# Patient Record
Sex: MALE | Race: WHITE | Employment: OTHER | URBAN - METROPOLITAN AREA
[De-identification: names, ages, dates, MRNs, and addresses within clinical notes are randomized per-mention and may not be internally consistent; named-entity substitution may affect disease eponyms.]

---

## 2017-01-23 ENCOUNTER — OFFICE VISIT (OUTPATIENT)
Dept: FAMILY MEDICINE CLINIC | Age: 57
End: 2017-01-23

## 2017-01-23 VITALS
WEIGHT: 293 LBS | DIASTOLIC BLOOD PRESSURE: 82 MMHG | SYSTOLIC BLOOD PRESSURE: 148 MMHG | RESPIRATION RATE: 16 BRPM | OXYGEN SATURATION: 97 % | BODY MASS INDEX: 35.68 KG/M2 | HEART RATE: 89 BPM | HEIGHT: 76 IN

## 2017-01-23 DIAGNOSIS — I10 ESSENTIAL HYPERTENSION: ICD-10-CM

## 2017-01-23 DIAGNOSIS — M25.561 ACUTE PAIN OF RIGHT KNEE: ICD-10-CM

## 2017-01-23 DIAGNOSIS — M25.461 EFFUSION, RIGHT KNEE: Primary | ICD-10-CM

## 2017-01-23 PROCEDURE — 99213 OFFICE O/P EST LOW 20 MIN: CPT | Performed by: NURSE PRACTITIONER

## 2017-01-23 RX ORDER — DICLOFENAC SODIUM 75 MG/1
75 TABLET, DELAYED RELEASE ORAL 2 TIMES DAILY
Qty: 30 TABLET | Refills: 0 | Status: SHIPPED | OUTPATIENT
Start: 2017-01-23 | End: 2017-02-16 | Stop reason: SDUPTHER

## 2017-01-23 ASSESSMENT — ENCOUNTER SYMPTOMS
VOMITING: 0
COUGH: 0
CHEST TIGHTNESS: 0
NAUSEA: 0
BACK PAIN: 0

## 2017-01-25 ENCOUNTER — OFFICE VISIT (OUTPATIENT)
Dept: FAMILY MEDICINE CLINIC | Age: 57
End: 2017-01-25

## 2017-01-25 VITALS
HEIGHT: 76 IN | SYSTOLIC BLOOD PRESSURE: 138 MMHG | DIASTOLIC BLOOD PRESSURE: 88 MMHG | BODY MASS INDEX: 35.31 KG/M2 | WEIGHT: 290 LBS | HEART RATE: 83 BPM | OXYGEN SATURATION: 98 %

## 2017-01-25 DIAGNOSIS — M25.561 ACUTE PAIN OF RIGHT KNEE: ICD-10-CM

## 2017-01-25 DIAGNOSIS — E11.9 TYPE 2 DIABETES MELLITUS WITHOUT COMPLICATION, WITHOUT LONG-TERM CURRENT USE OF INSULIN (HCC): Primary | ICD-10-CM

## 2017-01-25 DIAGNOSIS — I10 ESSENTIAL HYPERTENSION: ICD-10-CM

## 2017-01-25 DIAGNOSIS — E78.2 MIXED HYPERLIPIDEMIA: ICD-10-CM

## 2017-01-25 LAB — HBA1C MFR BLD: 7.1 %

## 2017-01-25 PROCEDURE — 83036 HEMOGLOBIN GLYCOSYLATED A1C: CPT | Performed by: FAMILY MEDICINE

## 2017-01-25 PROCEDURE — 99214 OFFICE O/P EST MOD 30 MIN: CPT | Performed by: FAMILY MEDICINE

## 2017-01-27 DIAGNOSIS — J45.41 MODERATE PERSISTENT ASTHMA WITH ACUTE EXACERBATION: ICD-10-CM

## 2017-01-27 DIAGNOSIS — I10 ESSENTIAL HYPERTENSION: ICD-10-CM

## 2017-01-27 DIAGNOSIS — J45.901 ASTHMA EXACERBATION: ICD-10-CM

## 2017-01-27 DIAGNOSIS — I47.9 PAROXYSMAL TACHYCARDIA (HCC): ICD-10-CM

## 2017-01-27 DIAGNOSIS — F17.200 SMOKER: ICD-10-CM

## 2017-01-27 DIAGNOSIS — E78.2 MIXED HYPERLIPIDEMIA: ICD-10-CM

## 2017-01-30 RX ORDER — METOPROLOL SUCCINATE 50 MG/1
TABLET, EXTENDED RELEASE ORAL
Qty: 30 TABLET | Refills: 3 | Status: SHIPPED | OUTPATIENT
Start: 2017-01-30 | End: 2017-07-25 | Stop reason: SDUPTHER

## 2017-01-31 ENCOUNTER — TELEPHONE (OUTPATIENT)
Dept: FAMILY MEDICINE CLINIC | Age: 57
End: 2017-01-31

## 2017-02-01 ENCOUNTER — HOSPITAL ENCOUNTER (OUTPATIENT)
Dept: PHYSICAL THERAPY | Age: 57
Discharge: HOME | End: 2017-02-28
Admitting: FAMILY MEDICINE

## 2017-02-01 ENCOUNTER — HOSPITAL ENCOUNTER (OUTPATIENT)
Dept: OTHER | Age: 57
Discharge: HOME | End: 2017-02-01
Attending: FAMILY MEDICINE | Admitting: FAMILY MEDICINE

## 2017-02-01 ENCOUNTER — HOSPITAL ENCOUNTER (OUTPATIENT)
Dept: PHYSICAL THERAPY | Age: 57
Discharge: HOME | End: 2017-01-31
Admitting: FAMILY MEDICINE

## 2017-02-01 DIAGNOSIS — M25.561 ACUTE PAIN OF RIGHT KNEE: ICD-10-CM

## 2017-02-06 ENCOUNTER — HOSPITAL ENCOUNTER (OUTPATIENT)
Dept: PHYSICAL THERAPY | Age: 57
Discharge: HOME | End: 2017-02-06
Admitting: FAMILY MEDICINE

## 2017-02-16 DIAGNOSIS — M25.461 EFFUSION, RIGHT KNEE: ICD-10-CM

## 2017-02-17 RX ORDER — DICLOFENAC SODIUM 75 MG/1
TABLET, DELAYED RELEASE ORAL
Qty: 30 TABLET | Refills: 2 | Status: SHIPPED | OUTPATIENT
Start: 2017-02-17 | End: 2017-05-02 | Stop reason: SDUPTHER

## 2017-03-27 DIAGNOSIS — J45.20 MILD INTERMITTENT ASTHMA WITHOUT COMPLICATION: ICD-10-CM

## 2017-03-28 RX ORDER — ALBUTEROL SULFATE 90 UG/1
POWDER, METERED RESPIRATORY (INHALATION)
Qty: 1 INHALER | Refills: 5 | Status: SHIPPED | OUTPATIENT
Start: 2017-03-28 | End: 2017-09-08 | Stop reason: SDUPTHER

## 2017-05-02 DIAGNOSIS — M25.461 EFFUSION, RIGHT KNEE: ICD-10-CM

## 2017-05-02 RX ORDER — DICLOFENAC SODIUM 75 MG/1
TABLET, DELAYED RELEASE ORAL
Qty: 30 TABLET | Refills: 2 | Status: SHIPPED | OUTPATIENT
Start: 2017-05-02 | End: 2017-11-28 | Stop reason: SDUPTHER

## 2017-05-23 ENCOUNTER — TELEPHONE (OUTPATIENT)
Dept: FAMILY MEDICINE CLINIC | Age: 57
End: 2017-05-23

## 2017-07-18 ENCOUNTER — OFFICE VISIT (OUTPATIENT)
Dept: CARDIOLOGY CLINIC | Age: 57
End: 2017-07-18

## 2017-07-18 VITALS
WEIGHT: 286 LBS | DIASTOLIC BLOOD PRESSURE: 88 MMHG | SYSTOLIC BLOOD PRESSURE: 138 MMHG | BODY MASS INDEX: 34.83 KG/M2 | HEART RATE: 80 BPM | OXYGEN SATURATION: 96 % | HEIGHT: 76 IN

## 2017-07-18 DIAGNOSIS — E78.2 MIXED HYPERLIPIDEMIA: ICD-10-CM

## 2017-07-18 DIAGNOSIS — I47.9 PAROXYSMAL TACHYCARDIA (HCC): ICD-10-CM

## 2017-07-18 DIAGNOSIS — I10 ESSENTIAL HYPERTENSION: ICD-10-CM

## 2017-07-18 DIAGNOSIS — I48.0 PAF (PAROXYSMAL ATRIAL FIBRILLATION) (HCC): Primary | ICD-10-CM

## 2017-07-18 PROCEDURE — 93000 ELECTROCARDIOGRAM COMPLETE: CPT | Performed by: INTERNAL MEDICINE

## 2017-07-18 PROCEDURE — 99215 OFFICE O/P EST HI 40 MIN: CPT | Performed by: INTERNAL MEDICINE

## 2017-07-24 ENCOUNTER — TELEPHONE (OUTPATIENT)
Dept: CARDIOLOGY CLINIC | Age: 57
End: 2017-07-24

## 2017-07-25 ENCOUNTER — NURSE ONLY (OUTPATIENT)
Dept: CARDIOLOGY CLINIC | Age: 57
End: 2017-07-25

## 2017-07-25 DIAGNOSIS — I10 ESSENTIAL HYPERTENSION: ICD-10-CM

## 2017-07-25 DIAGNOSIS — J45.41 MODERATE PERSISTENT ASTHMA WITH ACUTE EXACERBATION: ICD-10-CM

## 2017-07-25 DIAGNOSIS — I48.0 PAF (PAROXYSMAL ATRIAL FIBRILLATION) (HCC): ICD-10-CM

## 2017-07-25 DIAGNOSIS — I47.9 PAROXYSMAL TACHYCARDIA (HCC): Primary | ICD-10-CM

## 2017-07-25 DIAGNOSIS — I47.9 PAROXYSMAL TACHYCARDIA (HCC): ICD-10-CM

## 2017-07-25 DIAGNOSIS — J45.901 ASTHMA EXACERBATION: ICD-10-CM

## 2017-07-25 DIAGNOSIS — F17.200 SMOKER: ICD-10-CM

## 2017-07-25 DIAGNOSIS — R00.0 TACHYCARDIA: Primary | ICD-10-CM

## 2017-07-25 DIAGNOSIS — E78.2 MIXED HYPERLIPIDEMIA: ICD-10-CM

## 2017-07-26 RX ORDER — METOPROLOL SUCCINATE 50 MG/1
TABLET, EXTENDED RELEASE ORAL
Qty: 30 TABLET | Refills: 11 | Status: SHIPPED | OUTPATIENT
Start: 2017-07-26 | End: 2018-08-17 | Stop reason: SDUPTHER

## 2017-07-28 ENCOUNTER — TELEPHONE (OUTPATIENT)
Dept: CARDIOLOGY CLINIC | Age: 57
End: 2017-07-28

## 2017-08-04 ENCOUNTER — HOSPITAL ENCOUNTER (OUTPATIENT)
Dept: CARDIOLOGY | Facility: CLINIC | Age: 57
Discharge: HOME | End: 2017-08-04
Attending: INTERNAL MEDICINE | Admitting: INTERNAL MEDICINE

## 2017-08-04 LAB
LV EF: 60 %
LVEF MODALITY: NORMAL

## 2017-08-08 ENCOUNTER — TELEPHONE (OUTPATIENT)
Dept: CARDIOLOGY CLINIC | Age: 57
End: 2017-08-08

## 2017-08-21 RX ORDER — LISINOPRIL 10 MG/1
TABLET ORAL
Qty: 90 TABLET | Refills: 0 | Status: SHIPPED | OUTPATIENT
Start: 2017-08-21 | End: 2017-12-27 | Stop reason: SDUPTHER

## 2017-08-24 ENCOUNTER — TELEPHONE (OUTPATIENT)
Dept: CARDIOLOGY CLINIC | Age: 57
End: 2017-08-24

## 2017-08-24 DIAGNOSIS — I47.9 PAROXYSMAL TACHYCARDIA (HCC): ICD-10-CM

## 2017-08-24 DIAGNOSIS — I48.0 PAF (PAROXYSMAL ATRIAL FIBRILLATION) (HCC): ICD-10-CM

## 2017-08-24 PROCEDURE — 93272 ECG/REVIEW INTERPRET ONLY: CPT | Performed by: INTERNAL MEDICINE

## 2017-09-08 DIAGNOSIS — J45.20 MILD INTERMITTENT ASTHMA WITHOUT COMPLICATION: ICD-10-CM

## 2017-09-08 RX ORDER — ALBUTEROL SULFATE 90 UG/1
POWDER, METERED RESPIRATORY (INHALATION)
Qty: 1 INHALER | Refills: 5 | Status: SHIPPED | OUTPATIENT
Start: 2017-09-08 | End: 2018-02-03 | Stop reason: SDUPTHER

## 2017-10-04 DIAGNOSIS — E78.2 MIXED HYPERLIPIDEMIA: ICD-10-CM

## 2017-10-04 RX ORDER — ATORVASTATIN CALCIUM 40 MG/1
TABLET, FILM COATED ORAL
Qty: 90 TABLET | Refills: 0 | Status: SHIPPED | OUTPATIENT
Start: 2017-10-04 | End: 2018-01-12 | Stop reason: SDUPTHER

## 2017-11-16 ENCOUNTER — OFFICE VISIT (OUTPATIENT)
Dept: FAMILY MEDICINE CLINIC | Age: 57
End: 2017-11-16

## 2017-11-16 VITALS
DIASTOLIC BLOOD PRESSURE: 84 MMHG | TEMPERATURE: 99.4 F | HEART RATE: 71 BPM | SYSTOLIC BLOOD PRESSURE: 158 MMHG | RESPIRATION RATE: 13 BRPM | HEIGHT: 76 IN | BODY MASS INDEX: 33.9 KG/M2 | WEIGHT: 278.4 LBS | OXYGEN SATURATION: 98 %

## 2017-11-16 DIAGNOSIS — B96.89 ACUTE BACTERIAL SINUSITIS: ICD-10-CM

## 2017-11-16 DIAGNOSIS — Z12.5 PROSTATE CANCER SCREENING: ICD-10-CM

## 2017-11-16 DIAGNOSIS — R53.83 FATIGUE, UNSPECIFIED TYPE: ICD-10-CM

## 2017-11-16 DIAGNOSIS — M79.10 MYALGIA: ICD-10-CM

## 2017-11-16 DIAGNOSIS — Z13.220 LIPID SCREENING: ICD-10-CM

## 2017-11-16 DIAGNOSIS — M25.50 ARTHRALGIA, UNSPECIFIED JOINT: ICD-10-CM

## 2017-11-16 DIAGNOSIS — E11.9 TYPE 2 DIABETES MELLITUS WITHOUT COMPLICATION, UNSPECIFIED LONG TERM INSULIN USE STATUS: Primary | ICD-10-CM

## 2017-11-16 DIAGNOSIS — J01.90 ACUTE BACTERIAL SINUSITIS: ICD-10-CM

## 2017-11-16 DIAGNOSIS — H66.003 ACUTE SUPPURATIVE OTITIS MEDIA OF BOTH EARS WITHOUT SPONTANEOUS RUPTURE OF TYMPANIC MEMBRANES, RECURRENCE NOT SPECIFIED: ICD-10-CM

## 2017-11-16 LAB
CREATININE URINE POCT: 200
HBA1C MFR BLD: 7.1 %
MICROALBUMIN/CREAT 24H UR: 30 MG/G{CREAT}
MICROALBUMIN/CREAT UR-RTO: <30

## 2017-11-16 PROCEDURE — 82044 UR ALBUMIN SEMIQUANTITATIVE: CPT | Performed by: NURSE PRACTITIONER

## 2017-11-16 PROCEDURE — 99214 OFFICE O/P EST MOD 30 MIN: CPT | Performed by: NURSE PRACTITIONER

## 2017-11-16 PROCEDURE — 83036 HEMOGLOBIN GLYCOSYLATED A1C: CPT | Performed by: NURSE PRACTITIONER

## 2017-11-16 RX ORDER — LEVOFLOXACIN 500 MG/1
500 TABLET, FILM COATED ORAL DAILY
Qty: 7 TABLET | Refills: 0 | Status: SHIPPED | OUTPATIENT
Start: 2017-11-16 | End: 2017-11-29 | Stop reason: ALTCHOICE

## 2017-11-16 ASSESSMENT — ENCOUNTER SYMPTOMS
COUGH: 1
VOMITING: 0
SINUS PRESSURE: 1
CONSTIPATION: 0
NAUSEA: 0
BACK PAIN: 0
CHEST TIGHTNESS: 0

## 2017-11-16 ASSESSMENT — PATIENT HEALTH QUESTIONNAIRE - PHQ9
SUM OF ALL RESPONSES TO PHQ9 QUESTIONS 1 & 2: 0
SUM OF ALL RESPONSES TO PHQ QUESTIONS 1-9: 0
1. LITTLE INTEREST OR PLEASURE IN DOING THINGS: 0
2. FEELING DOWN, DEPRESSED OR HOPELESS: 0

## 2017-11-16 NOTE — PROGRESS NOTES
Ana M Levy was seen today for uri. Diagnoses and all orders for this visit:    Type 2 diabetes mellitus without complication, unspecified long term insulin use status (HCC)  -     POCT glycosylated hemoglobin (Hb A1C)  -     POCT microalbumin  -     Diabetic Foot Exam    Acute suppurative otitis media of both ears without spontaneous rupture of tympanic membranes, recurrence not specified  -     levofloxacin (LEVAQUIN) 500 MG tablet; Take 1 tablet by mouth daily    Acute bacterial sinusitis  -     levofloxacin (LEVAQUIN) 500 MG tablet; Take 1 tablet by mouth daily    Myalgia  -     Sedimentation Rate; Future  -     KHOA; Future  -     RHEUMATOID FACTOR; Future    Arthralgia, unspecified joint  -     Sedimentation Rate; Future  -     KHOA; Future  -     RHEUMATOID FACTOR; Future    Lipid screening  -     Comprehensive Metabolic Panel; Future  -     Lipid Panel; Future    Prostate cancer screening  -     PSA; Future    Fatigue, unspecified type  -     CBC Auto Differential; Future  -     Comprehensive Metabolic Panel; Future  -     TSH with Reflex; Future      Tylenol as needed for aching, temp    Will have fasting labs done 5-7 days after levaquin completed.

## 2017-11-16 NOTE — LETTER
[facility]  [address]  Phone: [phone]  Fax: [phone]    Vi Bray CNP        November 16, 2017     Patient: Joselin Soriano   YOB: 1960   Date of Visit: 11/16/2017       To Whom It May Concern: It is my medical opinion that Joselin Soriano will return to work 11/20/2017. If you have any questions or concerns, please don't hesitate to call.     Sincerely,        EMMANUEL HERR, CNP

## 2017-11-21 ENCOUNTER — TELEPHONE (OUTPATIENT)
Dept: FAMILY MEDICINE CLINIC | Age: 57
End: 2017-11-21

## 2017-11-21 RX ORDER — DOXYCYCLINE HYCLATE 100 MG/1
100 CAPSULE ORAL 2 TIMES DAILY
Qty: 20 CAPSULE | Refills: 0 | Status: SHIPPED | OUTPATIENT
Start: 2017-11-21 | End: 2017-12-01

## 2017-11-21 NOTE — TELEPHONE ENCOUNTER
Patient was in last Thursday Nov 16th for congestion and has few days left of antibiotic but not feeling any better. Symptoms still the same. cb patient what to do.

## 2017-11-28 DIAGNOSIS — M25.461 EFFUSION, RIGHT KNEE: ICD-10-CM

## 2017-11-28 RX ORDER — DICLOFENAC SODIUM 75 MG/1
TABLET, DELAYED RELEASE ORAL
Qty: 30 TABLET | Refills: 1 | Status: SHIPPED | OUTPATIENT
Start: 2017-11-28 | End: 2018-02-21 | Stop reason: SDUPTHER

## 2017-11-29 ENCOUNTER — OFFICE VISIT (OUTPATIENT)
Dept: FAMILY MEDICINE CLINIC | Age: 57
End: 2017-11-29

## 2017-11-29 VITALS
OXYGEN SATURATION: 98 % | HEART RATE: 97 BPM | SYSTOLIC BLOOD PRESSURE: 140 MMHG | WEIGHT: 282 LBS | DIASTOLIC BLOOD PRESSURE: 80 MMHG | BODY MASS INDEX: 34.33 KG/M2

## 2017-11-29 DIAGNOSIS — R25.2 LEG CRAMPING: ICD-10-CM

## 2017-11-29 DIAGNOSIS — M79.10 MUSCLE PAIN: Primary | ICD-10-CM

## 2017-11-29 DIAGNOSIS — E78.2 MIXED HYPERLIPIDEMIA: ICD-10-CM

## 2017-11-29 LAB
A/G RATIO: 1.6 (ref 1.1–2.2)
ALBUMIN SERPL-MCNC: 4.2 G/DL (ref 3.4–5)
ALP BLD-CCNC: 79 U/L (ref 40–129)
ALT SERPL-CCNC: 36 U/L (ref 10–40)
ANION GAP SERPL CALCULATED.3IONS-SCNC: 13 MMOL/L (ref 3–16)
AST SERPL-CCNC: 16 U/L (ref 15–37)
BILIRUB SERPL-MCNC: 0.3 MG/DL (ref 0–1)
BUN BLDV-MCNC: 17 MG/DL (ref 7–20)
CALCIUM SERPL-MCNC: 9.5 MG/DL (ref 8.3–10.6)
CHLORIDE BLD-SCNC: 99 MMOL/L (ref 99–110)
CHOLESTEROL, TOTAL: 178 MG/DL (ref 0–199)
CO2: 28 MMOL/L (ref 21–32)
CREAT SERPL-MCNC: 0.7 MG/DL (ref 0.9–1.3)
GFR AFRICAN AMERICAN: >60
GFR NON-AFRICAN AMERICAN: >60
GLOBULIN: 2.7 G/DL
GLUCOSE BLD-MCNC: 145 MG/DL (ref 70–99)
HDLC SERPL-MCNC: 38 MG/DL (ref 40–60)
LDL CHOLESTEROL CALCULATED: 113 MG/DL
MAGNESIUM: 2.2 MG/DL (ref 1.8–2.4)
POTASSIUM SERPL-SCNC: 4.6 MMOL/L (ref 3.5–5.1)
SODIUM BLD-SCNC: 140 MMOL/L (ref 136–145)
TOTAL CK: 56 U/L (ref 39–308)
TOTAL PROTEIN: 6.9 G/DL (ref 6.4–8.2)
TRIGL SERPL-MCNC: 134 MG/DL (ref 0–150)
VITAMIN D 25-HYDROXY: 18.9 NG/ML
VLDLC SERPL CALC-MCNC: 27 MG/DL

## 2017-11-29 PROCEDURE — 99213 OFFICE O/P EST LOW 20 MIN: CPT | Performed by: FAMILY MEDICINE

## 2017-11-29 RX ORDER — METHOCARBAMOL 500 MG/1
500 TABLET, FILM COATED ORAL 3 TIMES DAILY PRN
Qty: 30 TABLET | Refills: 0 | Status: SHIPPED | OUTPATIENT
Start: 2017-11-29 | End: 2018-04-30 | Stop reason: SDUPTHER

## 2017-11-29 NOTE — PROGRESS NOTES
Yane Murray is a 59 y.o. male    Chief Complaint   Patient presents with    Leg Pain     Pain in muscles of legs when he sits down or stands up    Arm Pain     Also aches and pains in muscles of arms. HPI:    HPI    Patient has been having a lot of muscle pains. He has been having pains in his thighs bilaterally and in his calves bilaterally. No history of DVT. No recent travel. He has also been having a lot of pain in his upper arms bilaterally. He does take Lipitor. The pain have been going on for about 2 months. He does not take a vitamin D supplement. ROS:    ROS    BP (!) 140/80   Pulse 97   Wt 282 lb (127.9 kg)   SpO2 98%   BMI 34.33 kg/m²     Physical Exam:    Physical Exam   Constitutional: He appears well-developed. No distress. Musculoskeletal:   Tenderness to palpation over the calves. No erythema or warmth over the calves. Good range of motion in the lower and upper extremities. Skin: He is not diaphoretic. Psychiatric: He has a normal mood and affect.        Current Outpatient Prescriptions   Medication Sig Dispense Refill    methocarbamol (ROBAXIN) 500 MG tablet Take 1 tablet by mouth 3 times daily as needed (muscle pains) 30 tablet 0    diclofenac (VOLTAREN) 75 MG EC tablet TAKE 1 TABLET TWICE A DAY 30 tablet 1    doxycycline hyclate (VIBRAMYCIN) 100 MG capsule Take 1 capsule by mouth 2 times daily for 10 days 20 capsule 0    atorvastatin (LIPITOR) 40 MG tablet TAKE 1 TABLET BY MOUTH DAILY AT BEDTIME 90 tablet 0    PROAIR RESPICLICK 340 (90 Base) MCG/ACT aerosol powder inhalation INHALE 2 PUFFS EVERY 6 HOURS AS NEEDED FOR WHEEZING/SHORTNESS OF BREATH 1 Inhaler 5    lisinopril (PRINIVIL;ZESTRIL) 10 MG tablet TAKE ONE TABLET BY MOUTH DAILY 90 tablet 0    metoprolol succinate (TOPROL XL) 50 MG extended release tablet TAKE 1 TABLET BY MOUTH DAILY 30 tablet 11    apixaban (ELIQUIS) 5 MG TABS tablet Take 1 tablet by mouth 2 times daily 60 tablet 5    metFORMIN

## 2017-11-30 RX ORDER — ERGOCALCIFEROL (VITAMIN D2) 1250 MCG
50000 CAPSULE ORAL WEEKLY
Qty: 8 CAPSULE | Refills: 0 | Status: SHIPPED | OUTPATIENT
Start: 2017-11-30 | End: 2018-04-09 | Stop reason: ALTCHOICE

## 2017-12-27 RX ORDER — LISINOPRIL 10 MG/1
TABLET ORAL
Qty: 90 TABLET | Refills: 0 | Status: SHIPPED | OUTPATIENT
Start: 2017-12-27 | End: 2018-04-30 | Stop reason: SDUPTHER

## 2018-01-08 ENCOUNTER — OFFICE VISIT (OUTPATIENT)
Dept: FAMILY MEDICINE CLINIC | Age: 58
End: 2018-01-08

## 2018-01-08 VITALS
BODY MASS INDEX: 34.33 KG/M2 | DIASTOLIC BLOOD PRESSURE: 82 MMHG | SYSTOLIC BLOOD PRESSURE: 142 MMHG | OXYGEN SATURATION: 98 % | WEIGHT: 282 LBS | HEART RATE: 110 BPM

## 2018-01-08 DIAGNOSIS — Z00.00 PREVENTATIVE HEALTH CARE: Primary | ICD-10-CM

## 2018-01-08 PROCEDURE — 99396 PREV VISIT EST AGE 40-64: CPT | Performed by: FAMILY MEDICINE

## 2018-01-12 DIAGNOSIS — E78.2 MIXED HYPERLIPIDEMIA: ICD-10-CM

## 2018-01-12 RX ORDER — ATORVASTATIN CALCIUM 40 MG/1
TABLET, FILM COATED ORAL
Qty: 90 TABLET | Refills: 1 | Status: SHIPPED | OUTPATIENT
Start: 2018-01-12 | End: 2020-02-07 | Stop reason: SDUPTHER

## 2018-01-31 ENCOUNTER — OFFICE VISIT (OUTPATIENT)
Dept: CARDIOLOGY CLINIC | Age: 58
End: 2018-01-31

## 2018-01-31 VITALS
DIASTOLIC BLOOD PRESSURE: 70 MMHG | BODY MASS INDEX: 34.7 KG/M2 | HEIGHT: 76 IN | SYSTOLIC BLOOD PRESSURE: 144 MMHG | OXYGEN SATURATION: 96 % | HEART RATE: 89 BPM | WEIGHT: 285 LBS

## 2018-01-31 DIAGNOSIS — I10 ESSENTIAL HYPERTENSION: ICD-10-CM

## 2018-01-31 DIAGNOSIS — I47.1 SVT (SUPRAVENTRICULAR TACHYCARDIA) (HCC): ICD-10-CM

## 2018-01-31 DIAGNOSIS — I48.0 PAF (PAROXYSMAL ATRIAL FIBRILLATION) (HCC): Primary | ICD-10-CM

## 2018-01-31 PROCEDURE — 99213 OFFICE O/P EST LOW 20 MIN: CPT | Performed by: INTERNAL MEDICINE

## 2018-01-31 NOTE — COMMUNICATION BODY
Assessment and Plan   Jayla Palmer is a 59 y.o. male who presents today for the following problems:       1. SVT: atrial tach              - no new event  2. PAF: short bursts seen on [facility] monitor and pt symptoms              - CHADS-VASC 2 (HTN and DM). 3. HTN: controlled        MD PLAN  1. PT doing well, seems to be in NSR with events              - spot check HR and no issues  2. Cont Eliquis for now, no issues              - d/w pt return to office for tachycardia and call w/ any bleeding issues         Patient Active Problem List   Diagnosis    Asthma    Asthma exacerbation    [facility]    Smoker    Acute respiratory failure ([facility]    Essential hypertension    Mixed hyperlipidemia    Paroxysmal tachycardia (HCC)    PAF (paroxysmal atrial fibrillation) ([facility]            Plan:  1. Continue current medications  2. Check pulse  3.  Follow up with me in 1 year

## 2018-01-31 NOTE — LETTER
[address] Cardiology - [address]  Phone: [phone]  Fax: [phone]    Otf Torres MD        January 31, 2018     [address] [facility], [address] [place] [address] Anaid Tam. [address]    Patient: She Weathers  MR Number: [id number]  YOB: 1960  Date of Visit: 1/31/2018    Dear  [address] [place] [address]: Thank you for allowing me to participate in the care of She Weathers. Below are the relevant portions of my assessment and plan of care. Assessment and Plan   She Weathers is a 59 y.o. male who presents today for the following problems:       1. SVT: atrial tach              - no new event  2. PAF: short bursts seen on [facility]) monitor and pt symptoms              - CHADS-VASC 2 (HTN and DM). 3. HTN: controlled        MD PLAN  1. PT doing well, seems to be in NSR with events              - spot check HR and no issues  2. Cont Eliquis for now, no issues              - d/w pt return to office for tachycardia and call w/ any bleeding issues         Patient Active Problem List   Diagnosis    Asthma    Asthma exacerbation    Streptococcal pneumonia ([address]    Smoker    Acute respiratory failure ([address]    Essential hypertension    Mixed hyperlipidemia    Paroxysmal tachycardia (HCC)    PAF (paroxysmal atrial fibrillation) ([facility]            Plan:  1. Continue current medications  2. Check pulse  3. Follow up with me in 1 year    If you have questions, please do not hesitate to call me. I look forward to following Kevyn Garcia along with you.     Sincerely,        Otf Torres MD

## 2018-01-31 NOTE — PROGRESS NOTES
[address] [facility]   Cardiovascular Evaluation    PATIENT: Neftaly Art  DATE: 2018  MRN: [id number]  CSN: [id number]  : 1960      Primary Care Doctor: Aleksandar Tavarez DO  Reason for evaluation:   Atrial Fibrillation      Subjective:   History of present illness on initial date of evaluation:   Neftaly Art is a 59 y.o. patient who presents to [facility]. He has previously been followed by Dr. Malissa Abreu. He has a history of PAT, HTN, HLD, DM and afib. He last wore an event monitor that showed episodes of afib. He reports he is symptomatic with his episodes, feeling fluttering. He has been taking his medications as prescribed. He tolerates daily activity well. He does complain of fatigue but attributes this to external stressors. He denies chest pain, syncope, dizziness, shortness of breath or edema. Today he is here for a 6 month follow up. He states he is feeling well. He had the flu a couple weeks ago. He has noticed no bleeding or dark stools. Patient Active Problem List   Diagnosis    Asthma    Asthma exacerbation    [facility]rept[facility] pneumonia ([facility]    Smoker    Acute respiratory failure ([facility]    Essential hypertension    Mixed hyperlipidemia    Paroxysmal tachycardia (HCC)    PAF (paroxysmal atrial fibrillation) ([facility]         Past Medical History:   has a past medical history of Allergy to environmental factors; Asthma; HTN (hypertension); and Mixed hyperlipidemia. Surgical History:   has a past surgical history that includes External ear surgery; Foot surgery; Tonsillectomy; and Adenoidectomy. Social History:   reports that he quit smoking about 2 years ago. His smoking use included Cigars and Cigarettes. He has a 15.00 pack-year smoking history. He has never used smokeless tobacco. He reports that he drinks alcohol. He reports that he does not use drugs.      Family History:  No evidence for sudden cardiac death or premature conjunctiva/corneas clear   Nose: Nares normal, no drainage or sinus tenderness   Throat: Lips, mucosa, and tongue normal   Neck: Supple, symmetrical, trachea midline, NL thyroid no carotid bruit or JVD   Lungs:   CTAB, respirations unlabored   Chest Wall:  No tenderness or deformity   Heart:  Regular rhythm and normal rate; S1, S2 are normal;   no murmur noted; no rub or gallop   Abdomen:   Soft, non-tender, +BS x 4, no masses, no organomegaly   Extremities: Extremities normal, atraumatic, no cyanosis or edema   Pulses: 2+ and symmetric   Skin: Skin color, texture, turgor normal, no rashes or lesions   Pysch: Normal mood and affect   Neurologic: Normal gross motor and sensory exam.         LABS   CBC:      Lab Results   Component Value Date    WBC 5.9 08/15/2016    RBC 5.15 08/15/2016    HGB 15.1 08/15/2016    HCT 45.1 08/15/2016    MCV 87.4 08/15/2016    RDW 13.6 08/15/2016     08/15/2016     CMP:  Lab Results   Component Value Date     11/29/2017    K 4.6 11/29/2017    CL 99 11/29/2017    CO2 28 11/29/2017    BUN 17 11/29/2017    CREATININE 0.7 11/29/2017    GFRAA >60 11/29/2017    AGRATIO 1.6 11/29/2017    LABGLOM >60 11/29/2017    GLUCOSE 145 11/29/2017    PROT 6.9 11/29/2017    CALCIUM 9.5 11/29/2017    BILITOT 0.3 11/29/2017    ALKPHOS 79 11/29/2017    AST 16 11/29/2017    ALT 36 11/29/2017     PT/INR:   No results found for: PTINR  Liver:  No components found for: CHLPL  Lab Results   Component Value Date    ALT 36 11/29/2017    AST 16 11/29/2017    ALKPHOS 79 11/29/2017    BILITOT 0.3 11/29/2017     Lab Results   Component Value Date    LABA1C 7.1 11/16/2017     Lipids:         Lab Results   Component Value Date    TRIG 134 11/29/2017    TRIG 171 (H) 08/16/2016    TRIG 162 (H) 01/22/2016            Lab Results   Component Value Date    HDL 38 (L) 11/29/2017    HDL 31 (L) 08/16/2016    HDL 48 01/22/2016            Lab Results   Component Value Date    LDLCALC 113 (H) 11/29/2017    LDLCALC 156 (H) 2016    LDLCALC 152 (H) 2016            Lab Results   Component Value Date    LABVLDL 27 2017    LABVLDL 34 2016    LABVLDL 32 2016         CARDIAC DATA   EK2017 NSR with no ischemia or infarcts    ECHO 17   Summary   Normal systolic function with an estimated ejection fraction of 60%.  Mild concentric left ventricular hypertrophy.   No regional wall motion abnormalities are seen.   Grade I diastolic dysfunction with normal filing pressure.     STRESS Echo 2016   No ischemic EKG changes. Baseline resting echocardiogram shows normal global LV systolic function   with an ejection fraction of 55-60% and uniform myocardial segmental wall   motion. Following stress there was uniform augmentation of all myocardial   segments with appropriate hyperdynamic LV systolic response to stress with   an ejection fraction of 70%. Negative for ischemia      CARDIAC CATH:    VASCULAR/OTHER IMAGING:      Assessment and Plan   Ce Weems is a 59 y.o. male who presents today for the following problems:      1. SVT: atrial tach   - no new event  2. PAF: short bursts seen on [facility] monitor and pt symptoms   - CHADS-VASC 2 (HTN and DM). 3. HTN: controlled      MD PLAN  1. PT doing well, seems to be in NSR with events   - spot check HR and no issues  2. Cont Eliquis for now, no issues   - d/w pt return to office for tachycardia and call w/ any bleeding issues     Patient Active Problem List   Diagnosis    Asthma    Asthma exacerbation    Streptococcal pneumonia ([facility]    Smoker    Acute respiratory failure ([facility]    Essential hypertension    Mixed hyperlipidemia    Paroxysmal tachycardia (HCC)    PAF (paroxysmal atrial fibrillation) ([facility]         Plan:  1. Continue current medications  2. Check pulse  3. Follow up with me in 1 year    QUALITY MEASURES  1. Tobacco Cessation Counseling: NA  2. Retake of BP if >140/90:   NA  3.  Documentation to PCP/referring for new patient:  Sent to PCP at close of office visit  4. CAD patient on anti-platelet: NA  5. CAD patient on STATIN therapy:  NA  6. Patient with CHF and aFib on anticoagulation:  No       It is a pleasure to assist in the care of [employer]. Please call with any questions. All questions and concerns were addressed to the patient/family. Alternatives to my treatment were discussed. The note was completed using EMR.            Saurabh Foy MD, [address] [facility] Cardiologist  [facility]  [phone] [facility]  [phone] [address] [place] [address]  1/31/2018  11:57 AM

## 2018-02-03 DIAGNOSIS — J45.20 MILD INTERMITTENT ASTHMA WITHOUT COMPLICATION: ICD-10-CM

## 2018-02-05 RX ORDER — ALBUTEROL SULFATE 90 UG/1
POWDER, METERED RESPIRATORY (INHALATION)
Qty: 1 INHALER | Refills: 5 | Status: SHIPPED | OUTPATIENT
Start: 2018-02-05 | End: 2018-07-30 | Stop reason: SDUPTHER

## 2018-02-05 NOTE — TELEPHONE ENCOUNTER
Last office visit 1/8/2018     Last written 9/8/17     Next office visit scheduled 7/9/2018    Requested Prescriptions     Pending Prescriptions Disp Refills    PROAIR RESPICLICK 529 (90 Base) MCG/ACT aerosol powder inhalation [Pharmacy Med Name: Cuco Malcolm  5     Sig: INHALE 2 PUFFS EVERY 6 HOURS AS NEEDED FOR WHEEZING/SHORTNESS OF BREATH

## 2018-02-21 DIAGNOSIS — M25.461 EFFUSION, RIGHT KNEE: ICD-10-CM

## 2018-02-21 RX ORDER — DICLOFENAC SODIUM 75 MG/1
TABLET, DELAYED RELEASE ORAL
Qty: 30 TABLET | Refills: 1 | Status: SHIPPED | OUTPATIENT
Start: 2018-02-21 | End: 2018-03-27 | Stop reason: SDUPTHER

## 2018-03-06 ENCOUNTER — OFFICE VISIT (OUTPATIENT)
Dept: FAMILY MEDICINE CLINIC | Age: 58
End: 2018-03-06

## 2018-03-06 ENCOUNTER — CARE COORDINATION (OUTPATIENT)
Dept: CASE MANAGEMENT | Age: 58
End: 2018-03-06

## 2018-03-06 VITALS
WEIGHT: 286 LBS | SYSTOLIC BLOOD PRESSURE: 142 MMHG | BODY MASS INDEX: 34.81 KG/M2 | OXYGEN SATURATION: 97 % | HEART RATE: 103 BPM | DIASTOLIC BLOOD PRESSURE: 92 MMHG

## 2018-03-06 DIAGNOSIS — J45.901 MODERATE ASTHMA WITH EXACERBATION, UNSPECIFIED WHETHER PERSISTENT: Primary | ICD-10-CM

## 2018-03-06 PROCEDURE — 99213 OFFICE O/P EST LOW 20 MIN: CPT | Performed by: FAMILY MEDICINE

## 2018-03-06 ASSESSMENT — ENCOUNTER SYMPTOMS: WHEEZING: 1

## 2018-03-27 DIAGNOSIS — M25.461 EFFUSION, RIGHT KNEE: ICD-10-CM

## 2018-03-27 RX ORDER — DICLOFENAC SODIUM 75 MG/1
TABLET, DELAYED RELEASE ORAL
Qty: 30 TABLET | Refills: 1 | Status: SHIPPED | OUTPATIENT
Start: 2018-03-27 | End: 2018-04-11 | Stop reason: SDUPTHER

## 2018-04-09 ENCOUNTER — OFFICE VISIT (OUTPATIENT)
Dept: FAMILY MEDICINE CLINIC | Age: 58
End: 2018-04-09

## 2018-04-09 VITALS
DIASTOLIC BLOOD PRESSURE: 94 MMHG | SYSTOLIC BLOOD PRESSURE: 142 MMHG | HEART RATE: 90 BPM | OXYGEN SATURATION: 98 % | BODY MASS INDEX: 34.45 KG/M2 | WEIGHT: 283 LBS

## 2018-04-09 DIAGNOSIS — J45.901 MODERATE ASTHMA WITH EXACERBATION, UNSPECIFIED WHETHER PERSISTENT: Primary | ICD-10-CM

## 2018-04-09 PROCEDURE — 99213 OFFICE O/P EST LOW 20 MIN: CPT | Performed by: FAMILY MEDICINE

## 2018-04-09 RX ORDER — FLUTICASONE FUROATE AND VILANTEROL 100; 25 UG/1; UG/1
1 POWDER RESPIRATORY (INHALATION) DAILY
Qty: 30 EACH | Refills: 3 | Status: SHIPPED | OUTPATIENT
Start: 2018-04-09 | End: 2019-06-07 | Stop reason: SDUPTHER

## 2018-04-09 ASSESSMENT — ENCOUNTER SYMPTOMS
WHEEZING: 1
CHEST TIGHTNESS: 1

## 2018-04-11 DIAGNOSIS — M25.461 EFFUSION, RIGHT KNEE: ICD-10-CM

## 2018-04-11 RX ORDER — DICLOFENAC SODIUM 75 MG/1
TABLET, DELAYED RELEASE ORAL
Qty: 30 TABLET | Refills: 1 | Status: ON HOLD | OUTPATIENT
Start: 2018-04-11 | End: 2020-06-10 | Stop reason: HOSPADM

## 2018-04-30 ENCOUNTER — OFFICE VISIT (OUTPATIENT)
Dept: FAMILY MEDICINE CLINIC | Age: 58
End: 2018-04-30

## 2018-04-30 VITALS
WEIGHT: 283 LBS | BODY MASS INDEX: 34.45 KG/M2 | SYSTOLIC BLOOD PRESSURE: 162 MMHG | DIASTOLIC BLOOD PRESSURE: 84 MMHG | HEART RATE: 84 BPM | OXYGEN SATURATION: 97 %

## 2018-04-30 DIAGNOSIS — R25.2 LEG CRAMPING: Primary | ICD-10-CM

## 2018-04-30 DIAGNOSIS — M79.10 MUSCLE PAIN: ICD-10-CM

## 2018-04-30 LAB
A/G RATIO: 2 (ref 1.1–2.2)
ALBUMIN SERPL-MCNC: 4.5 G/DL (ref 3.4–5)
ALP BLD-CCNC: 71 U/L (ref 40–129)
ALT SERPL-CCNC: 34 U/L (ref 10–40)
ANION GAP SERPL CALCULATED.3IONS-SCNC: 12 MMOL/L (ref 3–16)
AST SERPL-CCNC: 14 U/L (ref 15–37)
BILIRUB SERPL-MCNC: <0.2 MG/DL (ref 0–1)
BUN BLDV-MCNC: 19 MG/DL (ref 7–20)
CALCIUM SERPL-MCNC: 9.5 MG/DL (ref 8.3–10.6)
CHLORIDE BLD-SCNC: 98 MMOL/L (ref 99–110)
CO2: 29 MMOL/L (ref 21–32)
CREAT SERPL-MCNC: 0.7 MG/DL (ref 0.9–1.3)
GFR AFRICAN AMERICAN: >60
GFR NON-AFRICAN AMERICAN: >60
GLOBULIN: 2.3 G/DL
GLUCOSE BLD-MCNC: 197 MG/DL (ref 70–99)
MAGNESIUM: 2 MG/DL (ref 1.8–2.4)
POTASSIUM SERPL-SCNC: 4.5 MMOL/L (ref 3.5–5.1)
SODIUM BLD-SCNC: 139 MMOL/L (ref 136–145)
TOTAL PROTEIN: 6.8 G/DL (ref 6.4–8.2)

## 2018-04-30 PROCEDURE — 99213 OFFICE O/P EST LOW 20 MIN: CPT | Performed by: FAMILY MEDICINE

## 2018-04-30 RX ORDER — LISINOPRIL 10 MG/1
TABLET ORAL
Qty: 90 TABLET | Refills: 1 | Status: SHIPPED | OUTPATIENT
Start: 2018-04-30 | End: 2020-02-07 | Stop reason: SDUPTHER

## 2018-04-30 RX ORDER — METHOCARBAMOL 500 MG/1
500 TABLET, FILM COATED ORAL 3 TIMES DAILY PRN
Qty: 30 TABLET | Refills: 0 | Status: SHIPPED | OUTPATIENT
Start: 2018-04-30 | End: 2018-05-10

## 2018-05-01 LAB — VITAMIN D 25-HYDROXY: 16.6 NG/ML

## 2018-05-01 RX ORDER — APIXABAN 5 MG/1
5 TABLET, FILM COATED ORAL 2 TIMES DAILY
Qty: 180 TABLET | Refills: 3 | Status: SHIPPED | OUTPATIENT
Start: 2018-05-01 | End: 2020-02-07 | Stop reason: SDUPTHER

## 2018-05-01 RX ORDER — ERGOCALCIFEROL (VITAMIN D2) 1250 MCG
50000 CAPSULE ORAL WEEKLY
Qty: 8 CAPSULE | Refills: 0 | Status: SHIPPED | OUTPATIENT
Start: 2018-05-01 | End: 2018-07-09 | Stop reason: ALTCHOICE

## 2018-05-07 ENCOUNTER — TELEPHONE (OUTPATIENT)
Dept: FAMILY MEDICINE CLINIC | Age: 58
End: 2018-05-07

## 2018-07-09 ENCOUNTER — OFFICE VISIT (OUTPATIENT)
Dept: FAMILY MEDICINE CLINIC | Age: 58
End: 2018-07-09

## 2018-07-09 VITALS
HEART RATE: 85 BPM | BODY MASS INDEX: 34.69 KG/M2 | DIASTOLIC BLOOD PRESSURE: 82 MMHG | OXYGEN SATURATION: 98 % | SYSTOLIC BLOOD PRESSURE: 140 MMHG | WEIGHT: 285 LBS

## 2018-07-09 DIAGNOSIS — E11.9 TYPE 2 DIABETES MELLITUS WITHOUT COMPLICATION, WITHOUT LONG-TERM CURRENT USE OF INSULIN (HCC): Primary | ICD-10-CM

## 2018-07-09 DIAGNOSIS — E78.2 MIXED HYPERLIPIDEMIA: ICD-10-CM

## 2018-07-09 DIAGNOSIS — J45.20 MILD INTERMITTENT ASTHMA WITHOUT COMPLICATION: ICD-10-CM

## 2018-07-09 DIAGNOSIS — E55.9 VITAMIN D DEFICIENCY: ICD-10-CM

## 2018-07-09 DIAGNOSIS — I10 ESSENTIAL HYPERTENSION: ICD-10-CM

## 2018-07-09 LAB — HBA1C MFR BLD: 8.8 %

## 2018-07-09 PROCEDURE — 83036 HEMOGLOBIN GLYCOSYLATED A1C: CPT | Performed by: FAMILY MEDICINE

## 2018-07-09 PROCEDURE — 99214 OFFICE O/P EST MOD 30 MIN: CPT | Performed by: FAMILY MEDICINE

## 2018-07-09 NOTE — PROGRESS NOTES
Zion Tracy is a 59 y.o. male    Chief Complaint   Patient presents with    Diabetes    Hypertension    Hyperlipidemia    Asthma       HPI:    Diabetes   He presents for his follow-up diabetic visit. He has type 2 diabetes mellitus. His disease course has been stable. Pertinent negatives for diabetes include no polydipsia. Risk factors for coronary artery disease include hypertension and diabetes mellitus. Current diabetic treatment includes oral agent (monotherapy). An ACE inhibitor/angiotensin II receptor blocker is being taken. Hypertension   This is a chronic problem. The current episode started more than 1 year ago. The problem is unchanged. The problem is controlled. Past treatments include ACE inhibitors and beta blockers. The current treatment provides significant improvement. There are no compliance problems. Hyperlipidemia   This is a chronic problem. The current episode started more than 1 year ago. The problem is controlled. Recent lipid tests were reviewed and are low. Exacerbating diseases include diabetes. Pertinent negatives include no myalgias. Current antihyperlipidemic treatment includes statins. The current treatment provides significant improvement of lipids. There are no compliance problems. Risk factors for coronary artery disease include diabetes mellitus. Asthma, chronic. It is well controlled. Uses Breo daily. Rarely uses albuterol. ROS:    Review of Systems   Musculoskeletal: Negative for myalgias. Endo/Heme/Allergies: Negative for polydipsia. BP (!) 140/82   Pulse 85   Wt 285 lb (129.3 kg)   SpO2 98%   BMI 34.69 kg/m²     Physical Exam:    Physical Exam   Constitutional: He appears well-developed. No distress. Skin: He is not diaphoretic. Psychiatric: He has a normal mood and affect.  His behavior is normal.       Current Outpatient Prescriptions   Medication Sig Dispense Refill    ELIQUIS 5 MG TABS tablet TAKE 1 TABLET BY MOUTH 2 TIMES DAILY VITAMIN D 25 HYDROXY      Return in about 6 months (around 1/9/2019) for Preventative.

## 2018-07-10 LAB — VITAMIN D 25-HYDROXY: 23.8 NG/ML

## 2018-07-10 RX ORDER — ERGOCALCIFEROL (VITAMIN D2) 1250 MCG
50000 CAPSULE ORAL WEEKLY
Qty: 8 CAPSULE | Refills: 0 | Status: SHIPPED | OUTPATIENT
Start: 2018-07-10 | End: 2020-06-08 | Stop reason: ALTCHOICE

## 2018-08-17 DIAGNOSIS — F17.200 SMOKER: ICD-10-CM

## 2018-08-17 DIAGNOSIS — J45.41 MODERATE PERSISTENT ASTHMA WITH ACUTE EXACERBATION: ICD-10-CM

## 2018-08-17 DIAGNOSIS — E78.2 MIXED HYPERLIPIDEMIA: ICD-10-CM

## 2018-08-17 DIAGNOSIS — I47.9 PAROXYSMAL TACHYCARDIA (HCC): ICD-10-CM

## 2018-08-17 DIAGNOSIS — J45.901 ASTHMA EXACERBATION: ICD-10-CM

## 2018-08-17 DIAGNOSIS — I10 ESSENTIAL HYPERTENSION: ICD-10-CM

## 2018-08-17 RX ORDER — METOPROLOL SUCCINATE 50 MG/1
TABLET, EXTENDED RELEASE ORAL
Qty: 30 TABLET | Refills: 5 | Status: SHIPPED | OUTPATIENT
Start: 2018-08-17 | End: 2020-02-05

## 2018-08-17 NOTE — TELEPHONE ENCOUNTER
LIZABETH Pt  Last office visit 1/31/18  Plan:  1. Continue current medications  2. Check pulse  3. Follow up with me in 1 year    ekg 3/4/18  cmp 4/30/18  no upcoming appt scheduled.

## 2019-01-09 ENCOUNTER — TELEPHONE (OUTPATIENT)
Dept: CARDIOLOGY CLINIC | Age: 59
End: 2019-01-09

## 2019-01-14 ENCOUNTER — TELEPHONE (OUTPATIENT)
Dept: FAMILY MEDICINE CLINIC | Age: 59
End: 2019-01-14

## 2019-06-07 DIAGNOSIS — J45.901 MODERATE ASTHMA WITH EXACERBATION, UNSPECIFIED WHETHER PERSISTENT: ICD-10-CM

## 2019-06-07 RX ORDER — FLUTICASONE FUROATE AND VILANTEROL 100; 25 UG/1; UG/1
1 POWDER RESPIRATORY (INHALATION) DAILY
Qty: 30 EACH | Refills: 3 | Status: SHIPPED | OUTPATIENT
Start: 2019-06-07 | End: 2020-02-07 | Stop reason: SDUPTHER

## 2019-12-02 DIAGNOSIS — J45.20 MILD INTERMITTENT ASTHMA WITHOUT COMPLICATION: ICD-10-CM

## 2020-02-05 ENCOUNTER — NURSE TRIAGE (OUTPATIENT)
Dept: OTHER | Facility: CLINIC | Age: 60
End: 2020-02-05

## 2020-02-05 ENCOUNTER — OFFICE VISIT (OUTPATIENT)
Dept: FAMILY MEDICINE CLINIC | Age: 60
End: 2020-02-05
Payer: COMMERCIAL

## 2020-02-05 VITALS
OXYGEN SATURATION: 98 % | WEIGHT: 246 LBS | HEIGHT: 75 IN | SYSTOLIC BLOOD PRESSURE: 160 MMHG | DIASTOLIC BLOOD PRESSURE: 90 MMHG | BODY MASS INDEX: 30.59 KG/M2 | HEART RATE: 106 BPM

## 2020-02-05 LAB
CHP ED QC CHECK: ABNORMAL
GLUCOSE BLD-MCNC: 301 MG/DL

## 2020-02-05 PROCEDURE — 82962 GLUCOSE BLOOD TEST: CPT | Performed by: FAMILY MEDICINE

## 2020-02-05 PROCEDURE — 93000 ELECTROCARDIOGRAM COMPLETE: CPT | Performed by: FAMILY MEDICINE

## 2020-02-05 PROCEDURE — 99214 OFFICE O/P EST MOD 30 MIN: CPT | Performed by: FAMILY MEDICINE

## 2020-02-05 RX ORDER — METOPROLOL SUCCINATE 50 MG/1
50 TABLET, EXTENDED RELEASE ORAL DAILY
Qty: 30 TABLET | Refills: 3 | Status: SHIPPED | OUTPATIENT
Start: 2020-02-05 | End: 2020-02-20

## 2020-02-05 ASSESSMENT — PATIENT HEALTH QUESTIONNAIRE - PHQ9
1. LITTLE INTEREST OR PLEASURE IN DOING THINGS: 0
2. FEELING DOWN, DEPRESSED OR HOPELESS: 0
SUM OF ALL RESPONSES TO PHQ QUESTIONS 1-9: 0
SUM OF ALL RESPONSES TO PHQ QUESTIONS 1-9: 0
SUM OF ALL RESPONSES TO PHQ9 QUESTIONS 1 & 2: 0

## 2020-02-05 NOTE — TELEPHONE ENCOUNTER
Reason for Disposition   Heart beating very rapidly (e.g., > 140 / minute) and not present now (EXCEPTION: during exercise)    Protocols used: HEART RATE AND HEARTBEAT QUESTIONS-ADULT-[place]    Received call from [place]. Pt calling c/o increased heart rate for the past 1-2 weeks. He has a fitbit and has noticed that his pulse will stay elevated, even at rest. Currently sitting down and his pulse is 104. When he is up and active it ranges between 115-120. He also checks his pulse manually and it matches what is on his fitbit. At times has felt dizzy, not currently dizzy. No chest pain, no sob, no weakness. No cardiac history. He has switched to decaf coffee and limits caffeine. Recommend pt be seen in office today, call back if any new or worsening symptoms. Call soft transferred to [address] [place], [place] [address] to schedule appointment. Please do not respond to the triage nurse through this encounter. Any subsequent communication should be directly with the patient.

## 2020-02-06 ENCOUNTER — TELEPHONE (OUTPATIENT)
Dept: FAMILY MEDICINE CLINIC | Age: 60
End: 2020-02-06

## 2020-02-06 LAB
A/G RATIO: 1.8 (ref 1.1–2.2)
ALBUMIN SERPL-MCNC: 4.2 G/DL (ref 3.4–5)
ALP BLD-CCNC: 81 U/L (ref 40–129)
ALT SERPL-CCNC: 19 U/L (ref 10–40)
ANION GAP SERPL CALCULATED.3IONS-SCNC: 12 MMOL/L (ref 3–16)
AST SERPL-CCNC: 9 U/L (ref 15–37)
BASOPHILS ABSOLUTE: 0.1 K/UL (ref 0–0.2)
BASOPHILS RELATIVE PERCENT: 1.3 %
BILIRUB SERPL-MCNC: 0.4 MG/DL (ref 0–1)
BILIRUBIN URINE: NEGATIVE
BLOOD, URINE: NEGATIVE
BUN BLDV-MCNC: 19 MG/DL (ref 7–20)
CALCIUM SERPL-MCNC: 9.3 MG/DL (ref 8.3–10.6)
CHLORIDE BLD-SCNC: 98 MMOL/L (ref 99–110)
CLARITY: CLEAR
CO2: 26 MMOL/L (ref 21–32)
COLOR: YELLOW
CREAT SERPL-MCNC: 0.7 MG/DL (ref 0.8–1.3)
CREATININE URINE: 79.3 MG/DL (ref 39–259)
EOSINOPHILS ABSOLUTE: 0.1 K/UL (ref 0–0.6)
EOSINOPHILS RELATIVE PERCENT: 2.2 %
EPITHELIAL CELLS, UA: 0 /HPF (ref 0–5)
GFR AFRICAN AMERICAN: >60
GFR NON-AFRICAN AMERICAN: >60
GLOBULIN: 2.3 G/DL
GLUCOSE BLD-MCNC: 330 MG/DL (ref 70–99)
GLUCOSE URINE: >=1000 MG/DL
HCT VFR BLD CALC: 46.4 % (ref 40.5–52.5)
HEMOGLOBIN: 15.3 G/DL (ref 13.5–17.5)
HYALINE CASTS: 0 /LPF (ref 0–8)
KETONES, URINE: ABNORMAL MG/DL
LEUKOCYTE ESTERASE, URINE: NEGATIVE
LYMPHOCYTES ABSOLUTE: 1.2 K/UL (ref 1–5.1)
LYMPHOCYTES RELATIVE PERCENT: 27.4 %
MCH RBC QN AUTO: 29.1 PG (ref 26–34)
MCHC RBC AUTO-ENTMCNC: 32.9 G/DL (ref 31–36)
MCV RBC AUTO: 88.3 FL (ref 80–100)
MICROALBUMIN UR-MCNC: <1.2 MG/DL
MICROALBUMIN/CREAT UR-RTO: NORMAL MG/G (ref 0–30)
MICROSCOPIC EXAMINATION: ABNORMAL
MONOCYTES ABSOLUTE: 0.3 K/UL (ref 0–1.3)
MONOCYTES RELATIVE PERCENT: 6.4 %
NEUTROPHILS ABSOLUTE: 2.8 K/UL (ref 1.7–7.7)
NEUTROPHILS RELATIVE PERCENT: 62.7 %
NITRITE, URINE: NEGATIVE
PDW BLD-RTO: 13.7 % (ref 12.4–15.4)
PH UA: 6.5 (ref 5–8)
PLATELET # BLD: 220 K/UL (ref 135–450)
PMV BLD AUTO: 7.5 FL (ref 5–10.5)
POTASSIUM SERPL-SCNC: 4.5 MMOL/L (ref 3.5–5.1)
PROTEIN UA: NEGATIVE MG/DL
RBC # BLD: 5.25 M/UL (ref 4.2–5.9)
RBC UA: 1 /HPF (ref 0–4)
SODIUM BLD-SCNC: 136 MMOL/L (ref 136–145)
SPECIFIC GRAVITY UA: >1.03 (ref 1–1.03)
TOTAL PROTEIN: 6.5 G/DL (ref 6.4–8.2)
TSH SERPL DL<=0.05 MIU/L-ACNC: 4.68 UIU/ML (ref 0.27–4.2)
URINE TYPE: ABNORMAL
UROBILINOGEN, URINE: 1 E.U./DL
WBC # BLD: 4.4 K/UL (ref 4–11)
WBC UA: 1 /HPF (ref 0–5)

## 2020-02-06 NOTE — TELEPHONE ENCOUNTER
Patient was seen by Dr. Anabel Patton 2/5/20. Says Dr. Anabel Patton said he would give him a work excuse for 2/5-2/6. There is no letter generated. Would you please generate one for him. Please call to let him know either way.   [phone]

## 2020-02-07 ENCOUNTER — TELEPHONE (OUTPATIENT)
Dept: FAMILY MEDICINE CLINIC | Age: 60
End: 2020-02-07

## 2020-02-07 ENCOUNTER — OFFICE VISIT (OUTPATIENT)
Dept: FAMILY MEDICINE CLINIC | Age: 60
End: 2020-02-07
Payer: COMMERCIAL

## 2020-02-07 VITALS
SYSTOLIC BLOOD PRESSURE: 132 MMHG | DIASTOLIC BLOOD PRESSURE: 84 MMHG | BODY MASS INDEX: 30.5 KG/M2 | HEART RATE: 102 BPM | OXYGEN SATURATION: 98 % | WEIGHT: 244 LBS

## 2020-02-07 LAB
ESTIMATED AVERAGE GLUCOSE: 312.1 MG/DL
HBA1C MFR BLD: 12.5 %

## 2020-02-07 PROCEDURE — 90471 IMMUNIZATION ADMIN: CPT | Performed by: FAMILY MEDICINE

## 2020-02-07 PROCEDURE — 90686 IIV4 VACC NO PRSV 0.5 ML IM: CPT | Performed by: FAMILY MEDICINE

## 2020-02-07 PROCEDURE — 99214 OFFICE O/P EST MOD 30 MIN: CPT | Performed by: FAMILY MEDICINE

## 2020-02-07 RX ORDER — GLIPIZIDE 5 MG/1
5 TABLET ORAL 2 TIMES DAILY
Qty: 60 TABLET | Refills: 3 | Status: SHIPPED | OUTPATIENT
Start: 2020-02-07 | End: 2020-06-08 | Stop reason: SDUPTHER

## 2020-02-07 RX ORDER — LISINOPRIL 10 MG/1
TABLET ORAL
Qty: 90 TABLET | Refills: 1 | Status: SHIPPED | OUTPATIENT
Start: 2020-02-07 | End: 2020-08-13 | Stop reason: SDUPTHER

## 2020-02-07 RX ORDER — ATORVASTATIN CALCIUM 40 MG/1
TABLET, FILM COATED ORAL
Qty: 90 TABLET | Refills: 1 | Status: SHIPPED | OUTPATIENT
Start: 2020-02-07 | End: 2020-08-13 | Stop reason: SDUPTHER

## 2020-02-07 RX ORDER — FLUTICASONE FUROATE AND VILANTEROL 100; 25 UG/1; UG/1
1 POWDER RESPIRATORY (INHALATION) DAILY
Qty: 30 EACH | Refills: 3 | Status: SHIPPED | OUTPATIENT
Start: 2020-02-07 | End: 2020-06-08 | Stop reason: SDUPTHER

## 2020-02-07 NOTE — PROGRESS NOTES
Monica Pulliam is a 62 y.o. male    Chief Complaint   Patient presents with    Diabetes    Hypertension    Hyperlipidemia       HPI:    Diabetes   He presents for his follow-up diabetic visit. He has type 2 diabetes mellitus. His disease course has been stable. Associated symptoms include polyuria. Pertinent negatives for diabetes include no polydipsia. Diabetic complications include heart disease. Risk factors for coronary artery disease include diabetes mellitus, hypertension, male sex and obesity. When asked about current treatments, none were reported. An ACE inhibitor/angiotensin II receptor blocker is not being taken. Hypertension   This is a chronic problem. The current episode started more than 1 year ago. The problem is unchanged. The problem is controlled. Risk factors for coronary artery disease include diabetes mellitus, male gender and obesity. Past treatments include ACE inhibitors and beta blockers. The current treatment provides significant improvement. There are no compliance problems. Hypertensive end-organ damage includes CAD/MI. There is no history of kidney disease. Hyperlipidemia   This is a chronic problem. The current episode started more than 1 year ago. The problem is controlled. Recent lipid tests were reviewed and are low. Exacerbating diseases include diabetes. Pertinent negatives include no myalgias. Current antihyperlipidemic treatment includes statins. The current treatment provides moderate improvement of lipids. Compliance problems include medication cost.  Risk factors for coronary artery disease include hypertension, male sex, obesity and diabetes mellitus. Tachycardia has improved. He has resumed the metoprolol. No stroke-like symptoms. He is not taking the Eliquis with regards to his paroxysmal atrial fibrillation. He would like to restart this. ROS:    Review of Systems   Endocrine: Positive for polyuria. Negative for polydipsia.    Musculoskeletal: Negative for myalgias. /84   Pulse 102   Wt 244 lb (110.7 kg)   SpO2 98%   BMI 30.50 kg/m²     Physical Exam:    Physical Exam  Constitutional:       General: He is not in acute distress. Appearance: Normal appearance. He is obese. He is not ill-appearing. Neurological:      Mental Status: He is alert. Psychiatric:         Mood and Affect: Mood normal.         Behavior: Behavior normal.         Thought Content: Thought content normal.     Diabetic foot exam: sensation intact and equal bilaterally.      Current Outpatient Medications   Medication Sig Dispense Refill    apixaban (ELIQUIS) 5 MG TABS tablet Take 1 tablet by mouth 2 times daily 180 tablet 3    metFORMIN (GLUCOPHAGE) 500 MG tablet Take 2 tablets by mouth 2 times daily (with meals) 120 tablet 5    lisinopril (PRINIVIL;ZESTRIL) 10 MG tablet TAKE ONE TABLET BY MOUTH DAILY 90 tablet 1    atorvastatin (LIPITOR) 40 MG tablet TAKE 1 TABLET BY MOUTH DAILY AT BEDTIME 90 tablet 1    glipiZIDE (GLUCOTROL) 5 MG tablet Take 1 tablet by mouth 2 times daily 60 tablet 3    fluticasone-vilanterol (BREO ELLIPTA) 100-25 MCG/INH AEPB inhaler Inhale 1 puff into the lungs daily 30 each 3    aspirin 81 MG tablet Take 81 mg by mouth daily      metoprolol succinate (TOPROL XL) 50 MG extended release tablet Take 1 tablet by mouth daily 30 tablet 3    albuterol sulfate (PROAIR RESPICLICK) 070 (90 Base) MCG/ACT aerosol powder inhalation INHALE 2 PUFFS EVERY 6 HOURS AS NEEDED FOR WHEEZING/SHORTNESS OF BREATH 1 Inhaler 11    ergocalciferol (DRISDOL) 83581 units capsule Take 1 capsule by mouth once a week 8 capsule 0    diclofenac (VOLTAREN) 75 MG EC tablet TAKE 1 TABLET TWICE A DAY 30 tablet 1    vitamin D (CHOLECALCIFEROL) 1000 UNIT TABS tablet Take 2,000 Units by mouth 2 times daily       glucose blood VI test strips (ASCENSIA AUTODISC VI;ONE TOUCH ULTRA TEST VI) strip Test blood sugars twice daily 100 each 99    [employer] LANCETS ORIGINAL MISC Check Diabetes, Hypertension, Hyperlipidemia.

## 2020-02-07 NOTE — TELEPHONE ENCOUNTER
Called patient after speaking with Dr Iris Hunt regarding patient lab results from 2/6/20. Informed patient his diabetes is not being well controlled and he needs to see his PCP to follow up. Scheduled patient to see Dr. Marquis Pascual.

## 2020-02-19 NOTE — PROGRESS NOTES
[facility]   Cardiovascular Evaluation    PATIENT: Keri Westbrook  DATE: 2020  MRN: [id number]  CSN: [id number]  : 1960      Primary Care Doctor: Khloe Rodriguez DO  Reason for evaluation:   Tachycardia      Subjective:   History of present illness on initial date of evaluation:   Keri Westbrook is a 62 y.o. patient who presents to [facility]. He has previously been followed by Dr. Dickson Loera. He has a history of PAT, HTN, HLD, DM and afib. He last wore an event monitor that showed episodes of afib. He reports he is symptomatic with his episodes, feeling fluttering. He has been taking his medications as prescribed. He tolerates daily activity well. He does complain of fatigue but attributes this to external stressors. He denies chest pain, syncope, dizziness, shortness of breath or edema. Today he reports feeling ok. He states he recently moved back from [place]. He states he recently started having high HR's. He states just standing his HR was 124. He did not feel this. He states he only knew by wearing his fit-bit. He states he found out his BS was also running very high and is trying to get that under control. He states he had been off his medications x 8 months due to insurance loss. He has restarted now. Patient Active Problem List   Diagnosis    Asthma    Asthma exacerbation    [facility] ([facility]    Smoker    Acute respiratory failure ([facility]    Essential hypertension    Mixed hyperlipidemia    Paroxysmal tachycardia (HCC)    PAF (paroxysmal atrial fibrillation) (HCC)    Type 2 diabetes mellitus without complication, without long-term current use of insulin ([facility]         Past Medical History:   has a past medical history of Allergy to environmental factors, Asthma, HTN (hypertension), and Mixed hyperlipidemia. Surgical History:   has a past surgical history that includes External ear surgery;  Foot surgery; Tonsillectomy; and Adenoidectomy. Social History:   reports that he quit smoking about 4 years ago. His smoking use included cigars and cigarettes. He has a 15.00 pack-year smoking history. He has never used smokeless tobacco. He reports current alcohol use. He reports that he does not use drugs. Family History:  No evidence for sudden cardiac death or premature CAD    Home Medications:  Reviewed and are listed in nursing record. and/or listed below  Current Outpatient Medications   Medication Sig Dispense Refill    apixaban (ELIQUIS) 5 MG TABS tablet Take 1 tablet by mouth 2 times daily 180 tablet 3    metFORMIN (GLUCOPHAGE) 500 MG tablet Take 2 tablets by mouth 2 times daily (with meals) 120 tablet 5    lisinopril (PRINIVIL;ZESTRIL) 10 MG tablet TAKE ONE TABLET BY MOUTH DAILY 90 tablet 1    atorvastatin (LIPITOR) 40 MG tablet TAKE 1 TABLET BY MOUTH DAILY AT BEDTIME 90 tablet 1    glipiZIDE (GLUCOTROL) 5 MG tablet Take 1 tablet by mouth 2 times daily 60 tablet 3    fluticasone-vilanterol (BREO ELLIPTA) 100-25 MCG/INH AEPB inhaler Inhale 1 puff into the lungs daily 30 each 3    aspirin 81 MG tablet Take 81 mg by mouth daily      metoprolol succinate (TOPROL XL) 50 MG extended release tablet Take 1 tablet by mouth daily 30 tablet 3    albuterol sulfate (PROAIR RESPICLICK) 267 (90 Base) MCG/ACT aerosol powder inhalation INHALE 2 PUFFS EVERY 6 HOURS AS NEEDED FOR WHEEZING/SHORTNESS OF BREATH 1 Inhaler 11    ergocalciferol (DRISDOL) 05619 units capsule Take 1 capsule by mouth once a week 8 capsule 0    diclofenac (VOLTAREN) 75 MG EC tablet TAKE 1 TABLET TWICE A DAY (Patient not taking: Reported on 2/20/2020) 30 tablet 1    vitamin D (CHOLECALCIFEROL) 1000 UNIT TABS tablet Take 2,000 Units by mouth 2 times daily       glucose blood VI test strips (ASCENSIA AUTODISC VI;ONE TOUCH ULTRA TEST VI) strip Test blood sugars twice daily 100 each 99    [employer] LANCETS ORIGINAL MISC Check twice daily.  Lancets CALCIUM 9.3 2020    BILITOT 0.4 2020    ALKPHOS 81 2020    AST 9 2020    ALT 19 2020     PT/INR:   No results found for: PTINR  Liver:  No components found for: CHLPL  Lab Results   Component Value Date    ALT 19 2020    AST 9 (L) 2020    ALKPHOS 81 2020    BILITOT 0.4 2020     Lab Results   Component Value Date    LABA1C 12.5 2020     Lipids:         Lab Results   Component Value Date    TRIG 134 2017    TRIG 171 (H) 2016    TRIG 162 (H) 2016            Lab Results   Component Value Date    HDL 38 (L) 2017    HDL 31 (L) 2016    HDL 48 2016            Lab Results   Component Value Date    LDLCALC 113 (H) 2017    LDLCALC 156 (H) 2016    LDLCALC 152 (H) 2016            Lab Results   Component Value Date    LABVLDL 27 2017    LABVLDL 34 2016    LABVLDL 32 2016         CARDIAC DATA   EK2017 NSR with no ischemia or infarcts    ECHO 17   Summary   Normal systolic function with an estimated ejection fraction of 60%.  Mild concentric left ventricular hypertrophy.   No regional wall motion abnormalities are seen.   Grade I diastolic dysfunction with normal filing pressure.     STRESS Echo 2016   No ischemic EKG changes. Baseline resting echocardiogram shows normal global LV systolic function   with an ejection fraction of 55-60% and uniform myocardial segmental wall   motion. Following stress there was uniform augmentation of all myocardial   segments with appropriate hyperdynamic LV systolic response to stress with   an ejection fraction of 70%. Negative for ischemia      CARDIAC CATH:    VASCULAR/OTHER IMAGING:      Assessment and Plan   Evelin Rodriguez is a 62 y.o. male who presents today for the following problems:      1. SVT: atrial tach   - no new event  2.  PAF: short bursts seen on [facility] monitor and pt symptoms   - feeling more symptoms   - CHADS-VASC 2

## 2020-02-20 ENCOUNTER — OFFICE VISIT (OUTPATIENT)
Dept: CARDIOLOGY CLINIC | Age: 60
End: 2020-02-20
Payer: COMMERCIAL

## 2020-02-20 VITALS
WEIGHT: 250 LBS | SYSTOLIC BLOOD PRESSURE: 162 MMHG | OXYGEN SATURATION: 99 % | DIASTOLIC BLOOD PRESSURE: 86 MMHG | HEIGHT: 76 IN | HEART RATE: 98 BPM | BODY MASS INDEX: 30.44 KG/M2

## 2020-02-20 PROCEDURE — 99214 OFFICE O/P EST MOD 30 MIN: CPT | Performed by: INTERNAL MEDICINE

## 2020-02-20 RX ORDER — METOPROLOL SUCCINATE 50 MG/1
75 TABLET, EXTENDED RELEASE ORAL DAILY
Qty: 135 TABLET | Refills: 3 | Status: SHIPPED | OUTPATIENT
Start: 2020-02-20 | End: 2020-03-19 | Stop reason: SDUPTHER

## 2020-02-20 NOTE — PATIENT INSTRUCTIONS
Plan:  1. Referral to EP for increased HR's - discuss rhythm medications/ ablation /loop recorder  2.  Increase the metoprolol to 75 mg daily

## 2020-02-20 NOTE — LETTER
[facility]   Cardiovascular Evaluation    PATIENT: Juliana Cisneros  DATE: 2020  MRN: [id number]  CSN: [id number]  : 1960      Primary Care Doctor: Koffi Louis DO  Reason for evaluation:   Tachycardia      Subjective:   History of present illness on initial date of evaluation:   Juliana Cisneros is a 62 y.o. patient who presents to [facility]. He has previously been followed by Dr. Tommie Singer. He has a history of PAT, HTN, HLD, DM and afib. He last wore an event monitor that showed episodes of afib. He reports he is symptomatic with his episodes, feeling fluttering. He has been taking his medications as prescribed. He tolerates daily activity well. He does complain of fatigue but attributes this to external stressors. He denies chest pain, syncope, dizziness, shortness of breath or edema. Today he reports feeling ok. He states he recently moved back from [place]. He states he recently started having high HR's. He states just standing his HR was 124. He did not feel this. He states he only knew by wearing his fit-bit. He states he found out his BS was also running very high and is trying to get that under control. He states he had been off his medications x 8 months due to insurance loss. He has restarted now. Patient Active Problem List   Diagnosis    Asthma    Asthma exacerbation    Streptococcal pneumonia ([facility]    Smoker    Acute respiratory failure ([facility]    Essential hypertension    Mixed hyperlipidemia    Paroxysmal tachycardia (HCC)    PAF (paroxysmal atrial fibrillation) (HCC)    Type 2 diabetes mellitus without complication, without long-term current use of insulin ([facility]         Past Medical History:   has a past medical history of Allergy to environmental factors, Asthma, HTN (hypertension), and Mixed hyperlipidemia. Surgical History:   has a past surgical history that includes External ear surgery;  Foot LABGLOM >60 2020    GLUCOSE 330 2020    PROT 6.5 2020    CALCIUM 9.3 2020    BILITOT 0.4 2020    ALKPHOS 81 2020    AST 9 2020    ALT 19 2020     PT/INR:   No results found for: PTINR  Liver:  No components found for: CHLPL  Lab Results   Component Value Date    ALT 19 2020    AST 9 (L) 2020    ALKPHOS 81 2020    BILITOT 0.4 2020     Lab Results   Component Value Date    LABA1C 12.5 2020     Lipids:         Lab Results   Component Value Date    TRIG 134 2017    TRIG 171 (H) 2016    TRIG 162 (H) 2016            Lab Results   Component Value Date    HDL 38 (L) 2017    HDL 31 (L) 2016    HDL 48 2016            Lab Results   Component Value Date    LDLCALC 113 (H) 2017    LDLCALC 156 (H) 2016    LDLCALC 152 (H) 2016            Lab Results   Component Value Date    LABVLDL 27 2017    LABVLDL 34 2016    LABVLDL 32 2016         CARDIAC DATA   EK2017 NSR with no ischemia or infarcts    ECHO 17   Summary   Normal systolic function with an estimated ejection fraction of 60%.  Mild concentric left ventricular hypertrophy.   No regional wall motion abnormalities are seen.   Grade I diastolic dysfunction with normal filing pressure.     STRESS Echo 2016   No ischemic EKG changes. Baseline resting echocardiogram shows normal global LV systolic function   with an ejection fraction of 55-60% and uniform myocardial segmental wall   motion. Following stress there was uniform augmentation of all myocardial   segments with appropriate hyperdynamic LV systolic response to stress with   an ejection fraction of 70%. Negative for ischemia      CARDIAC CATH:    VASCULAR/OTHER IMAGING:      Assessment and Plan   Jovon Camilo is a 62 y.o. male who presents today for the following problems:      1.  SVT: atrial tach   - no new event 2. PAF: short bursts seen on [facility] monitor and pt symptoms   - feeling more symptoms   - CHADS-VASC 2 (HTN and DM). 3. HTN: elevated today        MD PLAN  1. PT moved back to [place] after 2 yrs in [place], recently dx with DM and A1c of 12.5  2. Increase toprol to 75mg poqday  3. Refer to EP to have conversations about possible loop recorder (to sense burden), antiarrythmic, or ablations  4. Cont Eliquis for now, no issues   - d/w pt return to office for tachycardia and call w/ any bleeding issues   - cont periodic checks of HR   5. If BP elevated next visit, will need to increase lisinopril    Patient Active Problem List   Diagnosis    Asthma    Asthma exacerbation    Streptococcal pneumonia ([facility]    Smoker    Acute respiratory failure ([facility]    Essential hypertension    Mixed hyperlipidemia    Paroxysmal tachycardia (HCC)    PAF (paroxysmal atrial fibrillation) (HCC)    Type 2 diabetes mellitus without complication, without long-term current use of insulin ([facility]         Plan:  1. Referral to EP for increased HR's - discuss rhythm medications/ ablation /loop recorder  2. Increase the metoprolol to 75 mg daily       QUALITY MEASURES  1. Tobacco Cessation Counseling: NA  2. Retake of BP if >140/90:   NA  3. Documentation to PCP/referring for new patient:  Sent to PCP at close of office visit  4. CAD patient on anti-platelet: NA  5. CAD patient on STATIN therapy:  NA  6. Patient with CHF and aFib on anticoagulation:  No       It is a pleasure to assist in the care of [employer]. Please call with any questions. All questions and concerns were addressed to the patient/family. Alternatives to my treatment were discussed.        This note was scribed in the presence of Yoshi Dunlap MD by Hamida Cummins RN.      Haseeb Villasenor MD, personally performed the services described in this documentation as scribed by the above signed scribe in my presence, and it

## 2020-03-04 ENCOUNTER — OFFICE VISIT (OUTPATIENT)
Dept: FAMILY MEDICINE CLINIC | Age: 60
End: 2020-03-04
Payer: COMMERCIAL

## 2020-03-04 VITALS
BODY MASS INDEX: 30.81 KG/M2 | OXYGEN SATURATION: 93 % | WEIGHT: 247.8 LBS | HEIGHT: 75 IN | SYSTOLIC BLOOD PRESSURE: 150 MMHG | HEART RATE: 97 BPM | DIASTOLIC BLOOD PRESSURE: 85 MMHG

## 2020-03-04 PROCEDURE — 99213 OFFICE O/P EST LOW 20 MIN: CPT | Performed by: FAMILY MEDICINE

## 2020-03-04 RX ORDER — AZITHROMYCIN 250 MG/1
250 TABLET, FILM COATED ORAL SEE ADMIN INSTRUCTIONS
Qty: 6 TABLET | Refills: 0 | Status: SHIPPED | OUTPATIENT
Start: 2020-03-04 | End: 2020-03-09

## 2020-03-04 RX ORDER — FLUTICASONE PROPIONATE 50 MCG
2 SPRAY, SUSPENSION (ML) NASAL DAILY
Qty: 3 BOTTLE | Refills: 1 | Status: SHIPPED | OUTPATIENT
Start: 2020-03-04 | End: 2022-04-26

## 2020-03-04 ASSESSMENT — ENCOUNTER SYMPTOMS
SINUS PRESSURE: 1
COUGH: 1
DIARRHEA: 0
VOMITING: 0
NAUSEA: 0

## 2020-03-04 NOTE — LETTER
[address]  [facility] [address]  Phone: [phone]  Fax: [phone] DO        March 4, 2020     Patient: Rhonda Moore   YOB: 1960   Date of Visit: 3/4/2020       To Whom It May Concern: It is my medical opinion that Rhonda Moore may return to work on 3/5/2020. Off 3/3 th    If you have any questions or concerns, please don't hesitate to call.     Sincerely,        Hetal Jones DO

## 2020-03-04 NOTE — PROGRESS NOTES
Subjective:      Patient ID: Owen Fitzpatrick is a 62 y.o. male. HPI  3 day Hx-plugged ears-pain Lt after blowing nose-PE tubes in past-several other Sx's--off color sinus & cough. Prior to Visit Medications :  Medication fluticasone (FLONASE) 50 MCG/ACT nasal spray, Sig 2 sprays by Each Nostril route daily, Taking? Yes, Authorizing Provider Ar Chase, DO    Medication azithromycin (ZITHROMAX) 250 MG tablet, Sig Take 1 tablet by mouth See Admin Instructions for 5 days 500mg on day 1 followed by 250mg on days 2 - 5, Taking? Yes, Authorizing Provider Ar Chase, DO    Medication metoprolol succinate (TOPROL XL) 50 MG extended release tablet, Sig Take 1.5 tablets by mouth daily, Taking? Yes, Authorizing Provider Paulette Almendarez MD    Medication apixaban (ELIQUIS) 5 MG TABS tablet, Sig Take 1 tablet by mouth 2 times daily, Taking? Yes, Authorizing Provider Karri Salomon, DO    Medication metFORMIN (GLUCOPHAGE) 500 MG tablet, Sig Take 2 tablets by mouth 2 times daily (with meals), Taking? Yes, Authorizing Provider Karri Salomon, DO    Medication lisinopril (PRINIVIL;ZESTRIL) 10 MG tablet, Sig TAKE ONE TABLET BY MOUTH DAILY, Taking? Yes, Authorizing Provider Karri Salomon, DO    Medication atorvastatin (LIPITOR) 40 MG tablet, Sig TAKE 1 TABLET BY MOUTH DAILY AT BEDTIME, Taking? Yes, Authorizing Provider Karri Salomon, DO    Medication glipiZIDE (GLUCOTROL) 5 MG tablet, Sig Take 1 tablet by mouth 2 times daily, Taking? Yes, Authorizing Provider Karri Salomon, DO    Medication fluticasone-vilanterol (BREO ELLIPTA) 100-25 MCG/INH AEPB inhaler, Sig Inhale 1 puff into the lungs daily, Taking? Yes, Authorizing Provider Karri Salomon, DO    Medication aspirin 81 MG tablet, Sig Take 81 mg by mouth daily, Taking?  Yes, Authorizing Provider Historical Provider, MD    Medication albuterol sulfate (PROAIR RESPICLICK) 850 (90 Base) MCG/ACT aerosol powder inhalation, Sig INHALE 2 PUFFS EVERY 6 HOURS AS NEEDED FOR WHEEZING/SHORTNESS OF BREATH, Taking? Yes, Authorizing Provider Be Handler, DO    Medication ergocalciferol (DRISDOL) 03847 units capsule, Sig Take 1 capsule by mouth once a week, Taking? Yes, Authorizing Provider Be Handler, DO    Medication diclofenac (VOLTAREN) 75 MG EC tablet, Sig TAKE 1 TABLET TWICE A DAY, Taking? Yes, Authorizing Provider Be Handler, DO    Medication vitamin D (CHOLECALCIFEROL) 1000 UNIT TABS tablet, Sig Take 2,000 Units by mouth 2 times daily , Taking? Yes, Authorizing Provider Historical Provider, MD    Medication glucose blood VI test strips (ASCENSIA AUTODISC VI;ONE TOUCH ULTRA TEST VI) strip, Sig Test blood sugars twice daily, Taking? Yes, Authorizing Provider Be Langer, DO    Medication [employer] LANCETS ORIGINAL MISC, Sig Check twice daily. Lancets approved by [employer] and patient choice, Taking? Yes, Authorizing Provider Be Langer, DO      Past Medical History:  No date: Allergy to environmental factors  No date: Asthma  8/16/2016: HTN (hypertension)  8/16/2016: Mixed hyperlipidemia        Review of Systems    Review of Systems   Constitutional: Negative for chills and fever. HENT: Positive for congestion, ear pain, hearing loss and sinus pressure. Eyes: Negative for visual disturbance. Respiratory: Positive for cough. Gastrointestinal: Negative for diarrhea, nausea and vomiting. Neurological: Positive for headaches. Objective:   Physical Exam      Physical Exam  Constitutional:       Appearance: Normal appearance. HENT:      Right Ear: There is no impacted cerumen. Left Ear: There is no impacted cerumen. Ears:      Comments: Chronic changes both tympanic membranes with scarring-mild scarring of both tympanic membranes-mild erythema of the left tympanic membrane-no discharge noted-no evidence of cerumen in either canal     Nose:      Comments: Tender maxillary sinuses.      Mouth/Throat:      Mouth: Mucous membranes are moist. Pharynx: Oropharynx is clear. Eyes:      Conjunctiva/sclera: Conjunctivae normal.   Cardiovascular:      Rate and Rhythm: Normal rate and regular rhythm. Heart sounds: Normal heart sounds. Pulmonary:      Effort: Pulmonary effort is normal.      Breath sounds: Normal breath sounds. Lymphadenopathy:      Cervical: No cervical adenopathy. Neurological:      Mental Status: He is alert and oriented to person, place, and time. Assessment:      1. Acute non-recurrent maxillary sinusitis    2. ETD (Eustachian tube dysfunction), bilateral            Plan:      Iqra Patel was seen today for other. Diagnoses and all orders for this visit:    Acute non-recurrent maxillary sinusitis  Prescription sent for Z-Urbano as directed. ETD (Eustachian tube dysfunction), bilateral  Prescription sent for Flonase- May take generic Claritin or Allegra if needed-do not take any Sudafed  Other orders  -     fluticasone (FLONASE) 50 MCG/ACT nasal spray; 2 sprays by Each Nostril route daily  -     azithromycin (ZITHROMAX) 250 MG tablet; Take 1 tablet by mouth See Admin Instructions for 5 days 500mg on day 1 followed by 250mg on days 2 - 5  Notify Dr. Nelson Coyne if ears are not back to where they were prior to this episode.             Ar Chase, DO

## 2020-03-09 NOTE — PROGRESS NOTES
[address]   Electrophysiology Consult Note              Date:  March 10, 2020  Patient name: Pravin Madera  YOB: 1960    Reason for Consult:  Atrial Fibrillation and Tachycardia    Requesting Physician:Marlyn Stark DO    HISTORY OF PRESENT ILLNESS: Pravin Madera is a 62 y.o. male who presents for evaluation for paroxysmal atrial fibrillation. He has a PMH of DM, PAT, HLD, asthma and PAF. He recently moved back from [place]. He recently wore an event monitor and that showed episodes of afib. Dr. Maikel Gary increased metoprolol was increased on 2/20/2020 to 75 mg daily due to increased heart rates. Today he states that he doesn't feel his heart race but will check watch it will be 120's when he is up doing normal activities. .  EKG today shows SR 91 bpm.  Patient denies chest pain, palpitations, dizziness or syncope. He does have shortness of breath but this is from his asthma. Deep breathing exercises or resting brings his heart rate down. This happens daily. At rest his heart rate is . He passed out about 2-3 years ago but has not since. He is taking his medications as prescribed. He denies difficulty lying flat in bed or swelling in his feet. He has had a sinus and ear infection last week. He states he vapes. He denies drinking alcohol, smoking, or illicit drugs. Past Medical History:   has a past medical history of Allergy to environmental factors, Asthma, HTN (hypertension), and Mixed hyperlipidemia. Past Surgical History:   has a past surgical history that includes External ear surgery; Foot surgery; Tonsillectomy; and Adenoidectomy. Allergies:  Penicillins and Codeine    Social History:   reports that he quit smoking about 4 years ago. His smoking use included cigars and cigarettes. He has a 15.00 pack-year smoking history. He has never used smokeless tobacco. He reports current alcohol use. He reports that he does not use drugs. ORIGINAL MISC Check twice daily. Lancets approved by insurance company and patient choice 1/22/16   Marlyn Stark DO       REVIEW OF SYSTEMS:    All 14-point review of systems are completed and  pertinent positives are mentioned in the history of present illness. Other  systems are reviewed and are negative. Physical Examination:    Pulse 81   Ht 6' 4\" (1.93 m)   Wt 250 lb (113.4 kg)   SpO2 97%   BMI 30.43 kg/m²      Constitutional and General Appearance:    alert, cooperative, no distress and appears stated age  [de-identified]:    PERRLA, no cervical lymphadenopathy. No masses palpable. Normal oral  mucosa  Respiratory:  · Normal excursion and expansion without use of accessory muscles  · Resp Auscultation: Normal breath sounds without dullness or wheezing  Cardiovascular:  · The apical impulse is not displaced  · Heart tones are crisp and normal. regular S1 and S2. Abdomen:  · No masses or tenderness  · Bowel sounds present  Extremities:  ·  No Cyanosis or Clubbing  ·  Lower extremity edema: No  · Skin: Warm and dry  Neurological:  · Alert and oriented. · Moves all extremities well  · No abnormalities of mood, affect, memory, mentation, or behavior are noted    DATA:    ECG:    ECG: 3/10/2010  SR 91 bpm  ECHO: 8/04/2017  Summary   Normal systolic function with an estimated ejection fraction of 60%. Mild concentric left ventricular hypertrophy. No regional wall motion abnormalities are seen. Grade I diastolic dysfunction with normal filing pressure.      [id number] Score for Atrial Fibrillation Stroke Risk   Risk   Factors  Component Value   C CHF No 0   H HTN Yes 1   A2 Age >= 76 No,  (60 y.o.) 0   D DM Yes 1   S2 Prior Stroke/TIA No 0   V Vascular Disease No 0   A Age 74-69 No,  (60 y.o.) 0   Sc Sex male 0    [id number]  Score  2   Score last updated 3/9/20 53:91 AM EDT    Click here for a link to the UpToDate guideline \"Atrial Fibrillation: Anticoagulation therapy to prevent embolization    Disclaimer: Risk Score calculation is dependent on accuracy of patient problem list and past encounter diagnosis. IMPRESSION:    1. Paroxysmal atrial foqdzfifvwhl-[id number] Score 2 (DM and HTN)  Patient is a 64-year-old male with a medical history significant for diabetes, asthma, hypertension, and paroxysmal atrial fibrillation who presents with recurrent episodes of palpitations and tachycardia with heart rates in the 120s to 130s. Patient was diagnosed with atrial fibrillation based on a monitor back in 2016 that showed a low burden of atrial fibrillation approximately 0.5%. Since then he has been on anticoagulation and rate control. He is compliant with all his medications. He denies history of stroke. He was referred to me because of a resting heart rate with mild activity in the 120s to 130s mistyped being on a beta-blocker. At this point I am unclear what his tachycardia is from. He does have a history of atrial tachycardia before in the past.  In order to see what rhythm he is in during these episodes I would like to offer him a one-week monitor since episodes occur daily. Patient will return monitor and we will follow-up with him as far as the results of the monitor. Ending on the results we will determine whether or not to pursue more aggressive therapy. 2.  Paroxysmal atrial tachycardia-7 day monitor ordered  Plan as per above. RECOMMENDATIONS:  1. Medications discussed. EP study and ablation discussed. 2.  7 day cardiac event monitor. Loop monitor discussed. This pasts for 3 years. 3.  Continue current beta blocker  4. Follow up in 3 weeks      QUALITY MEASURES  1. Tobacco Cessation Counseling: NA  2. Retake of BP if >140/90:   Yes  3. Documentation to PCP/referring for new patient:  Sent to PCP at close of office visit  4. CAD patient on anti-platelet: No  5. CAD patient on STATIN therapy:  No  6.  Patient with CHF and aFib on anticoagulation:  Yes     All questions and concerns were addressed to the patient/family. Alternatives to my treatment were discussed. This note was scribed in the presence of Mukesh Sarmiento MD by Ender Rao RN. Dr. Mukesh Sarmiento MD  Electrophysiology  \[facility]\"" [address]. [address]. [place] [address]  Phone: [phone]  Fax: [phone]     NOTE: This report was transcribed using voice recognition software. Every effort was made to ensure accuracy, however, inadvertent computerized transcription errors may be present. The scribe's documentation has been prepared under my direction and personally reviewed by me in its entirety. I confirm that the note above accurately reflects all work, physical examination, the discussion of treatments and procedures, and medical decision making performed by me. Darien Moritz, MD personally performed the services described in this documentation as scribed by nurse in my presence, and is both accurate and complete.     Electronically signed by India Diego MD on 3/10/2020 at 9:47 AM

## 2020-03-10 ENCOUNTER — OFFICE VISIT (OUTPATIENT)
Dept: CARDIOLOGY CLINIC | Age: 60
End: 2020-03-10
Payer: COMMERCIAL

## 2020-03-10 ENCOUNTER — TELEPHONE (OUTPATIENT)
Dept: CARDIOLOGY CLINIC | Age: 60
End: 2020-03-10

## 2020-03-10 VITALS
OXYGEN SATURATION: 97 % | HEART RATE: 81 BPM | BODY MASS INDEX: 30.44 KG/M2 | HEIGHT: 76 IN | SYSTOLIC BLOOD PRESSURE: 148 MMHG | DIASTOLIC BLOOD PRESSURE: 72 MMHG | WEIGHT: 250 LBS

## 2020-03-10 PROCEDURE — 93000 ELECTROCARDIOGRAM COMPLETE: CPT | Performed by: INTERNAL MEDICINE

## 2020-03-10 PROCEDURE — 0296T PR EXT ECG > 48HR TO 21 DAY RCRD W/CONECT INTL RCRD: CPT | Performed by: INTERNAL MEDICINE

## 2020-03-10 PROCEDURE — 99244 OFF/OP CNSLTJ NEW/EST MOD 40: CPT | Performed by: INTERNAL MEDICINE

## 2020-03-10 NOTE — LETTER
[address] Cardiology - [address]  Phone: [phone]  Fax: [phone]    Lidia Teixeira MD        March 10, 2020    [address] [place] [address]      To whom it may concern:  Jovon Camilo 1960 was seen in my office today. If you have any questions or concerns, please don't hesitate to call.     Sincerely,        Lidia Teixeira MD

## 2020-03-10 NOTE — LETTER
alcohol use. He reports that he does not use drugs. Family History: family history includes Asthma in his brother; Cancer in his father; Diabetes in his maternal grandmother; Heart Disease in his maternal uncle and mother. Home Medications:    Prior to Admission medications    Medication Sig Start Date End Date Taking?  Authorizing Provider   fluticasone (FLONASE) 50 MCG/ACT nasal spray 2 sprays by Each Nostril route daily 3/4/20   Ar Chase,    metoprolol succinate (TOPROL XL) 50 MG extended release tablet Take 1.5 tablets by mouth daily 2/20/20   Mary Kelly MD   apixaban (ELIQUIS) 5 MG TABS tablet Take 1 tablet by mouth 2 times daily 2/7/20   Angelita Mark DO   metFORMIN (GLUCOPHAGE) 500 MG tablet Take 2 tablets by mouth 2 times daily (with meals) 2/7/20   Marlyn Stark DO   lisinopril (PRINIVIL;ZESTRIL) 10 MG tablet TAKE ONE TABLET BY MOUTH DAILY 2/7/20   Marlyn Stark, DO   atorvastatin (LIPITOR) 40 MG tablet TAKE 1 TABLET BY MOUTH DAILY AT BEDTIME 2/7/20   Marlyn Stark, DO   glipiZIDE (GLUCOTROL) 5 MG tablet Take 1 tablet by mouth 2 times daily 2/7/20   Marlyn Stark DO   fluticasone-vilanterol (BREO ELLIPTA) 100-25 MCG/INH AEPB inhaler Inhale 1 puff into the lungs daily 2/7/20   Marlyn Stark DO   aspirin 81 MG tablet Take 81 mg by mouth daily    Historical Provider, MD   albuterol sulfate (PROAIR RESPICLICK) 184 (90 Base) MCG/ACT aerosol powder inhalation INHALE 2 PUFFS EVERY 6 HOURS AS NEEDED FOR WHEEZING/SHORTNESS OF BREATH 12/2/19   Marlyn Stark DO   ergocalciferol (DRISDOL) 52795 units capsule Take 1 capsule by mouth once a week 7/10/18   Marlyn Stark DO   diclofenac (VOLTAREN) 75 MG EC tablet TAKE 1 TABLET TWICE A DAY 4/11/18   Marlyn Stark DO   vitamin D (CHOLECALCIFEROL) 1000 UNIT TABS tablet Take 2,000 Units by mouth 2 times daily     Historical Provider, MD   glucose blood VI test strips (ASCENSIA AUTODISC VI;ONE TOUCH ULTRA TEST Click here for a link to the UpToDate guideline \"Atrial Fibrillation: Anticoagulation therapy to prevent embolization    Disclaimer: Risk Score calculation is dependent on accuracy of patient problem list and past encounter diagnosis. IMPRESSION:    1. Paroxysmal atrial lnlkoozhgqip-ONK5NB4-WUBg Score 2 (DM and HTN)  Patient is a 78-year-old male with a medical history significant for diabetes, asthma, hypertension, and paroxysmal atrial fibrillation who presents with recurrent episodes of palpitations and tachycardia with heart rates in the 120s to 130s. Patient was diagnosed with atrial fibrillation based on a monitor back in 2016 that showed a low burden of atrial fibrillation approximately 0.5%. Since then he has been on anticoagulation and rate control. He is compliant with all his medications. He denies history of stroke. He was referred to me because of a resting heart rate with mild activity in the 120s to 130s mistyped being on a beta-blocker. At this point I am unclear what his tachycardia is from. He does have a history of atrial tachycardia before in the past.  In order to see what rhythm he is in during these episodes I would like to offer him a one-week monitor since episodes occur daily. Patient will return monitor and we will follow-up with him as far as the results of the monitor. Ending on the results we will determine whether or not to pursue more aggressive therapy. 2.  Paroxysmal atrial tachycardia-7 day monitor ordered  Plan as per above. RECOMMENDATIONS:  1. Medications discussed. EP study and ablation discussed. 2.  7 day cardiac event monitor. Loop monitor discussed. This pasts for 3 years. 3.  Continue current beta blocker  4. Follow up in 3 weeks      QUALITY MEASURES  1. Tobacco Cessation Counseling: NA  2. Retake of BP if >140/90:   Yes  3.  Documentation to PCP/referring for new patient:  Sent to PCP at close of office visit 4. CAD patient on anti-platelet: No  5. CAD patient on STATIN therapy:  No  6. Patient with CHF and aFib on anticoagulation:  Yes     All questions and concerns were addressed to the patient/family. Alternatives to my treatment were discussed. This note was scribed in the presence of Verner Crow MD by Seth Munroe RN. Dr. Verner Crow MD  Electrophysiology  [facility]. [facility] [facility]. [address]. [place] [address]  Phone: [phone]  Fax: [phone]     NOTE: This report was transcribed using voice recognition software. Every effort was made to ensure accuracy, however, inadvertent computerized transcription errors may be present. The scribe's documentation has been prepared under my direction and personally reviewed by me in its entirety. I confirm that the note above accurately reflects all work, physical examination, the discussion of treatments and procedures, and medical decision making performed by me. Meg Rushing MD personally performed the services described in this documentation as scribed by nurse in my presence, and is both accurate and complete.     Electronically signed by Sonali Guzman MD on 3/10/2020 at 9:47 AM

## 2020-03-12 ENCOUNTER — HOSPITAL ENCOUNTER (EMERGENCY)
Age: 60
Discharge: HOME | End: 2020-03-12
Payer: COMMERCIAL

## 2020-03-12 ENCOUNTER — APPOINTMENT (OUTPATIENT)
Dept: GENERAL RADIOLOGY | Age: 60
End: 2020-03-12
Payer: COMMERCIAL

## 2020-03-12 ENCOUNTER — TELEPHONE (OUTPATIENT)
Dept: CARDIOLOGY CLINIC | Age: 60
End: 2020-03-12

## 2020-03-12 VITALS
BODY MASS INDEX: 29.96 KG/M2 | DIASTOLIC BLOOD PRESSURE: 90 MMHG | OXYGEN SATURATION: 98 % | RESPIRATION RATE: 14 BRPM | SYSTOLIC BLOOD PRESSURE: 153 MMHG | HEIGHT: 76 IN | HEART RATE: 77 BPM | WEIGHT: 246 LBS | TEMPERATURE: 98.2 F

## 2020-03-12 LAB
A/G RATIO: 1.3 (ref 1.1–2.2)
ALBUMIN SERPL-MCNC: 4 G/DL (ref 3.4–5)
ALP BLD-CCNC: 77 U/L (ref 40–129)
ALT SERPL-CCNC: 27 U/L (ref 10–40)
ANION GAP SERPL CALCULATED.3IONS-SCNC: 13 MMOL/L (ref 3–16)
AST SERPL-CCNC: 13 U/L (ref 15–37)
BILIRUB SERPL-MCNC: 0.3 MG/DL (ref 0–1)
BUN BLDV-MCNC: 19 MG/DL (ref 7–20)
CALCIUM SERPL-MCNC: 9.4 MG/DL (ref 8.3–10.6)
CHLORIDE BLD-SCNC: 101 MMOL/L (ref 99–110)
CO2: 25 MMOL/L (ref 21–32)
CREAT SERPL-MCNC: 0.7 MG/DL (ref 0.8–1.3)
GFR AFRICAN AMERICAN: >60
GFR NON-AFRICAN AMERICAN: >60
GLOBULIN: 3.2 G/DL
GLUCOSE BLD-MCNC: 83 MG/DL (ref 70–99)
HCT VFR BLD CALC: 43.1 % (ref 40.5–52.5)
HEMOGLOBIN: 14.6 G/DL (ref 13.5–17.5)
MCH RBC QN AUTO: 29.3 PG (ref 26–34)
MCHC RBC AUTO-ENTMCNC: 33.8 G/DL (ref 31–36)
MCV RBC AUTO: 86.5 FL (ref 80–100)
PDW BLD-RTO: 13.8 % (ref 12.4–15.4)
PLATELET # BLD: 294 K/UL (ref 135–450)
PMV BLD AUTO: 7 FL (ref 5–10.5)
POTASSIUM SERPL-SCNC: 4.3 MMOL/L (ref 3.5–5.1)
RBC # BLD: 4.98 M/UL (ref 4.2–5.9)
SODIUM BLD-SCNC: 139 MMOL/L (ref 136–145)
TOTAL PROTEIN: 7.2 G/DL (ref 6.4–8.2)
TROPONIN: <0.01 NG/ML
WBC # BLD: 8 K/UL (ref 4–11)

## 2020-03-12 PROCEDURE — 84484 ASSAY OF TROPONIN QUANT: CPT

## 2020-03-12 PROCEDURE — 71046 X-RAY EXAM CHEST 2 VIEWS: CPT

## 2020-03-12 PROCEDURE — 80053 COMPREHEN METABOLIC PANEL: CPT

## 2020-03-12 PROCEDURE — 93005 ELECTROCARDIOGRAM TRACING: CPT | Performed by: NURSE PRACTITIONER

## 2020-03-12 PROCEDURE — 85027 COMPLETE CBC AUTOMATED: CPT

## 2020-03-12 PROCEDURE — 99285 EMERGENCY DEPT VISIT HI MDM: CPT

## 2020-03-12 NOTE — ED NOTES
--Patient provided with discharge instructions and any prescriptions. --Instructions, dosing, and follow-up appointments reviewed with patient/family. No further questions or needs at this time. --Vital signs and patient stable upon discharge. --Patient ambulatory to [facility].          Tarun Sherwood RN  03/12/20 1404

## 2020-03-13 LAB
EKG ATRIAL RATE: 91 BPM
EKG DIAGNOSIS: NORMAL
EKG P AXIS: 65 DEGREES
EKG P-R INTERVAL: 156 MS
EKG Q-T INTERVAL: 350 MS
EKG QRS DURATION: 74 MS
EKG QTC CALCULATION (BAZETT): 430 MS
EKG R AXIS: 61 DEGREES
EKG T AXIS: 76 DEGREES
EKG VENTRICULAR RATE: 91 BPM

## 2020-03-13 PROCEDURE — 93010 ELECTROCARDIOGRAM REPORT: CPT | Performed by: INTERNAL MEDICINE

## 2020-03-15 NOTE — ED PROVIDER NOTES
(1.93 m) 246 lb (111.6 kg)       Physical Exam    CONSTITUTIONAL: Awake and alert. Cooperative. Well-developed. Well-nourished. Non-toxic. No acute distress. Vitals:    03/12/20 1540 03/12/20 1730 03/12/20 1731 03/12/20 1833   BP: (!) 167/96  (!) 161/92 (!) 153/90   Pulse: 96   77   Resp: 18   14   Temp: 98.2 °F (36.8 °C)      TempSrc: Oral      SpO2: 96% 98% 98% 98%   Weight: 246 lb (111.6 kg)      Height: 6' 4\" (1.93 m)        HENT: Normocephalic. Atraumatic. External ears normal, without discharge. TMs clear bilaterally. Nonasal discharge. Oropharynx clear, no erythema. Mucous membranes moist.  EYES: Conjunctiva non-injected, nolid abnormalities noted. No scleral icterus. PERRL. EOM's grossly intact. Anterior chambers clear. NECK: Supple. Normal ROM. No meningismus. No thyroid tenderness or swelling noted. CARDIOVASCULAR: RRR. No Murmer. No carotid bruits. PULMONARY/CHEST WALL: Effort normal. No tachypnea. Lungs clear to ausculation. ABDOMEN: Normal BS. Soft. Nondistended. No tenderness to palpation. No guarding. No hernias noted. No splenomegaly. Back: Spine is midline. No ecchymosis. No crepituson palpation. No obvious subluxation of vertebral column. No saddle anesthesia or evidence of cauda equina. /ANORECTAL: Not assessed  MUSKULOSKELETAL: Normal ROM. No acute deformities. No edema. No tenderness to palpate. SKIN: Warm and dry. NEUROLOGICAL:  GCS 15. CN II-XII grossly intact. Strength is 5/5 in all extremities and sensation is intact. PSYCHIATRIC: Normal affect, normal insight and judgement. Alert and oriented x 3.         DIAGNOSTIC RESULTS:     LABS:    Results for orders placed or performed during the hospital encounter of 03/12/20   Comprehensive Metabolic Panel   Result Value Ref Range    Sodium 139 136 - 145 mmol/L    Potassium 4.3 3.5 - 5.1 mmol/L    Chloride 101 99 - 110 mmol/L    CO2 25 21 - 32 mmol/L    Anion Gap 13 3 - 16    Glucose 83 70 - 99 mg/dL    BUN 19 7 - 20 mg/dL

## 2020-03-19 ENCOUNTER — OFFICE VISIT (OUTPATIENT)
Dept: FAMILY MEDICINE CLINIC | Age: 60
End: 2020-03-19
Payer: COMMERCIAL

## 2020-03-19 VITALS — SYSTOLIC BLOOD PRESSURE: 134 MMHG | OXYGEN SATURATION: 98 % | DIASTOLIC BLOOD PRESSURE: 74 MMHG | HEART RATE: 104 BPM

## 2020-03-19 PROCEDURE — 99214 OFFICE O/P EST MOD 30 MIN: CPT | Performed by: FAMILY MEDICINE

## 2020-03-19 PROCEDURE — 1111F DSCHRG MED/CURRENT MED MERGE: CPT | Performed by: FAMILY MEDICINE

## 2020-03-19 RX ORDER — METOPROLOL SUCCINATE 50 MG/1
100 TABLET, EXTENDED RELEASE ORAL DAILY
Qty: 180 TABLET | Refills: 3 | Status: SHIPPED | OUTPATIENT
Start: 2020-03-19 | End: 2020-03-31

## 2020-03-19 NOTE — PROGRESS NOTES
Chantal Berger is a 62 y.o. male    Chief Complaint   Patient presents with    Follow-Up from Hospital       HPI:    HPI    ER f/u from complaints of tachycardia. Patient states that he wears a heart monitor/Holter monitor since Tuesday because he has been having tachycardia, he states that today he saw his heart rate go up to 120 and so he wanted to come in and get checked. Patient states that he had some dizziness at work and he stopped at a pharmacy to check it and noticed that it was high. Patient is wearing the Holter monitor because he has episodes of tachycardia. Patient does see cardiology and has appointment to follow-up with cardiology. Denies any symptoms currently. His metoprolol dose was not changed in the ED. ROS:    Review of Systems   Cardiovascular: Negative for palpitations. Neurological: Negative for dizziness. /74   Pulse 104   SpO2 98%     Physical Exam:    Physical Exam  Constitutional:       General: He is not in acute distress. Appearance: He is well-developed and normal weight. He is not toxic-appearing or diaphoretic. HENT:      Head: Normocephalic. Nose: Nose normal. No congestion or rhinorrhea. Mouth/Throat:      Mouth: Mucous membranes are moist.      Pharynx: Oropharynx is clear. No oropharyngeal exudate or posterior oropharyngeal erythema. Neck:      Musculoskeletal: Normal range of motion. No neck rigidity. Cardiovascular:      Rate and Rhythm: Regular rhythm. Tachycardia present. Pulses: Normal pulses. Heart sounds: No murmur. Pulmonary:      Effort: Pulmonary effort is normal. No respiratory distress. Breath sounds: Normal breath sounds. No wheezing. Lymphadenopathy:      Cervical: No cervical adenopathy. Neurological:      Mental Status: He is alert. Psychiatric:         Mood and Affect: Mood normal.         Behavior: Behavior normal.         Thought Content:  Thought content normal.         Current Outpatient Medications   Medication Sig Dispense Refill    metoprolol succinate (TOPROL XL) 50 MG extended release tablet Take 2 tablets by mouth daily 180 tablet 3    fluticasone (FLONASE) 50 MCG/ACT nasal spray 2 sprays by Each Nostril route daily 3 Bottle 1    apixaban (ELIQUIS) 5 MG TABS tablet Take 1 tablet by mouth 2 times daily 180 tablet 3    metFORMIN (GLUCOPHAGE) 500 MG tablet Take 2 tablets by mouth 2 times daily (with meals) 120 tablet 5    lisinopril (PRINIVIL;ZESTRIL) 10 MG tablet TAKE ONE TABLET BY MOUTH DAILY 90 tablet 1    atorvastatin (LIPITOR) 40 MG tablet TAKE 1 TABLET BY MOUTH DAILY AT BEDTIME 90 tablet 1    glipiZIDE (GLUCOTROL) 5 MG tablet Take 1 tablet by mouth 2 times daily 60 tablet 3    fluticasone-vilanterol (BREO ELLIPTA) 100-25 MCG/INH AEPB inhaler Inhale 1 puff into the lungs daily 30 each 3    aspirin 81 MG tablet Take 81 mg by mouth daily      albuterol sulfate (PROAIR RESPICLICK) 622 (90 Base) MCG/ACT aerosol powder inhalation INHALE 2 PUFFS EVERY 6 HOURS AS NEEDED FOR WHEEZING/SHORTNESS OF BREATH 1 Inhaler 11    ergocalciferol (DRISDOL) 48814 units capsule Take 1 capsule by mouth once a week 8 capsule 0    diclofenac (VOLTAREN) 75 MG EC tablet TAKE 1 TABLET TWICE A DAY 30 tablet 1    vitamin D (CHOLECALCIFEROL) 1000 UNIT TABS tablet Take 2,000 Units by mouth 2 times daily       glucose blood VI test strips (ASCENSIA AUTODISC VI;ONE TOUCH ULTRA TEST VI) strip Test blood sugars twice daily 100 each 99    [facility] LANCETS ORIG[facility] Check twice daily. Lancets approved by insurance company and patient choice 100 each 3     No current facility-administered medications for this visit. Assessment:    1. Paroxysmal tachycardia ([address]    2. PAF (paroxysmal atrial fibrillation) ([address]    3. Type 2 diabetes mellitus without complication, without long-term current use of insulin ([address]    4. Screening for colon cancer        Plan:    1.  Paroxysmal tachycardia ([address]  Per

## 2020-03-20 ENCOUNTER — TELEPHONE (OUTPATIENT)
Dept: FAMILY MEDICINE CLINIC | Age: 60
End: 2020-03-20

## 2020-03-20 NOTE — TELEPHONE ENCOUNTER
Pt needs a note sent to his employer stating that he is at high risk with the Corona Virus since he has asthma, tachycardia and is a diabetic.     Please call pt when letter is ready and pt will  at office

## 2020-03-26 PROCEDURE — 0298T PR EXT ECG > 48HR TO 21 DAY REVIEW AND INTERPRETATN: CPT | Performed by: INTERNAL MEDICINE

## 2020-03-30 ENCOUNTER — TELEPHONE (OUTPATIENT)
Dept: NON INVASIVE DIAGNOSTICS | Age: 60
End: 2020-03-30

## 2020-03-30 NOTE — TELEPHONE ENCOUNTER
Monitor Results  Predominant rhythm: NSR. Arrhythmias: PSVT likely AT. No significant arrhythmias. Recommendations: Follow up as scheduled. I called patient.

## 2020-03-30 NOTE — PROGRESS NOTES
[facility]   Electrophysiology Follow Up Note              Date:  March 31, 2020  Patient name: Keri Westbrook  YOB: 1960    Reason for Follow Up:  Palpitations and afib    Requesting Physician:Marlyn Stark DO    HISTORY OF PRESENT ILLNESS: Keri Westbrook is a 62 y.o. male who presents for evaluation for paroxysmal atrial fibrillation. He has a PMH of DM, PAT, HLD, asthma and PAF. He recently moved back from [place]. He recently wore an event monitor and that showed episodes of afib. Dr. Rola Patel increased metoprolol was increased on 2/20/2020 to 75 mg daily due to increased heart rates. On 3/10/2020, his EKG today showed SR 91 bpm.    He stated he vapes. He denies drinking alcohol, smoking, or illicit drugs. Interval history since last office visit: He went to ED on 3/12/20 for tachycardia, HTN, and dizziness. He was discharged with no medication changed. His JENNIFER from 3/30/20 noted NSR, PAC's <1 %, PVC's <1%, 1 episode of AT and no afib or aflutter. Today he states he feels better with the increase of metoprolol. He is still having fast heart rate but less frequently. He states that he not getting much exercise. He sits in front of TV or in front of computer mostly everyday. He is taking his medications as prescribed. Patient denies chest pain, sob, dizziness or syncope. EKG today shows SR 90 bpm.      Past Medical History:   has a past medical history of Allergy to environmental factors, Asthma, HTN (hypertension), and Mixed hyperlipidemia. Past Surgical History:   has a past surgical history that includes External ear surgery; Foot surgery; Tonsillectomy; and Adenoidectomy. Allergies:  Penicillins and Codeine    Social History:   reports that he quit smoking about 4 years ago. His smoking use included cigars and cigarettes. He has a 15.00 pack-year smoking history. He has never used smokeless tobacco. He reports current alcohol use.  He link to the UpToDate guideline \"Atrial Fibrillation: Anticoagulation therapy to prevent embolization    Disclaimer: Risk Score calculation is dependent on accuracy of patient problem list and past encounter diagnosis. IMPRESSION:    1. Paroxysmal atrial [id number] Score 2 (DM and HTN)  03/10/2020  Patient is a 68-year-old male with a medical history significant for diabetes, asthma, hypertension, and paroxysmal atrial fibrillation who presents with recurrent episodes of palpitations and tachycardia with heart rates in the 120s to 130s. Patient was diagnosed with atrial fibrillation based on a monitor back in 2016 that showed a low burden of atrial fibrillation approximately 0.5%. Since then he has been on anticoagulation and rate control. He is compliant with all his medications. He denies history of stroke. He was referred to me because of a resting heart rate with mild activity in the 120s to 130s mistyped being on a beta-blocker. At this point I am unclear what his tachycardia is from. He does have a history of atrial tachycardia before in the past.  In order to see what rhythm he is in during these episodes I would like to offer him a one-week monitor since episodes occur daily. Patient will return monitor and we will follow-up with him as far as the results of the monitor. Ending on the results we will determine whether or not to pursue more aggressive therapy. 03/31/2020  Patient presents for follow-up. He was recently seen in the emergency room with tachycardia/palpitations. He was discharged for follow-up. His monitor shows atrial tachycardia without any atrial fibrillation. We discussed increasing his rate control agents. Patient would like to try this before any other intervention. He will follow-up with me in 3 to 6 months.     2.  Paroxysmal atrial tachycardia  Plan as per above.     RECOMMENDATIONS:  1. Medications discussed. EP study and ablation discussed.   2. We will increase his beta-blocker.      QUALITY MEASURES  1. Tobacco Cessation Counseling: NA  2. Retake of BP if >140/90:   Yes  3. Documentation to PCP/referring for new patient:  Sent to PCP at close of office visit  4. CAD patient on anti-platelet: No  5. CAD patient on STATIN therapy:  No  6. Patient with CHF and aFib on anticoagulation: Yes      All questions and concerns were addressed to the patient/family. Alternatives to my treatment were discussed.     RECOMMENDATIONS:  1. Increase metoprolol succinate to take 100mg in the morning and 50mg at night. 2.  Follow up 6 months    QUALITY MEASURES  1. Tobacco Cessation Counseling: NA  2. Retake of BP if >140/90:   Yes  3. Documentation to PCP/referring for new patient:  Sent to PCP at close of office visit  4. CAD patient on anti-platelet: No  5. CAD patient on STATIN therapy:  No  6. Patient with CHF and aFib on anticoagulation:  Yes     All questions and concerns were addressed to the patient/family. Alternatives to my treatment were discussed. This note was scribed in the presence of Gissell Rodriguez MD by Charmayne Sauce, RN. Dr. Gissell Rodriguez MD  Electrophysiology  [facility]. [address] [place] [address]. [address]. [place] [address]  Phone: [phone]  Fax: [phone]     NOTE: This report was transcribed using voice recognition software. Every effort was made to ensure accuracy, however, inadvertent computerized transcription errors may be present. Electronically signed by Charmayne Sauce, RN on 3/31/2020 at 9:43 AM     The scribe's documentation has been prepared under my direction and personally reviewed by me in its entirety. I confirm that the note above accurately reflects all work, physical examination, the discussion of treatments and procedures, and medical decision making performed by me.       Zari Villafuerte MD personally performed the services described in this documentation as scribed by nurse in my presence, and

## 2020-03-31 ENCOUNTER — TELEPHONE (OUTPATIENT)
Dept: GASTROENTEROLOGY | Age: 60
End: 2020-03-31

## 2020-03-31 ENCOUNTER — OFFICE VISIT (OUTPATIENT)
Dept: CARDIOLOGY CLINIC | Age: 60
End: 2020-03-31
Payer: COMMERCIAL

## 2020-03-31 ENCOUNTER — VIRTUAL VISIT (OUTPATIENT)
Dept: GASTROENTEROLOGY | Age: 60
End: 2020-03-31
Payer: COMMERCIAL

## 2020-03-31 VITALS — BODY MASS INDEX: 30.44 KG/M2 | WEIGHT: 250 LBS | HEIGHT: 76 IN

## 2020-03-31 VITALS
WEIGHT: 253.5 LBS | SYSTOLIC BLOOD PRESSURE: 130 MMHG | BODY MASS INDEX: 30.87 KG/M2 | DIASTOLIC BLOOD PRESSURE: 70 MMHG | OXYGEN SATURATION: 98 % | HEART RATE: 88 BPM | HEIGHT: 76 IN

## 2020-03-31 PROBLEM — Z12.11 COLON CANCER SCREENING: Status: ACTIVE | Noted: 2020-03-31

## 2020-03-31 PROCEDURE — 99214 OFFICE O/P EST MOD 30 MIN: CPT | Performed by: INTERNAL MEDICINE

## 2020-03-31 PROCEDURE — 99202 OFFICE O/P NEW SF 15 MIN: CPT | Performed by: INTERNAL MEDICINE

## 2020-03-31 PROCEDURE — 93000 ELECTROCARDIOGRAM COMPLETE: CPT | Performed by: INTERNAL MEDICINE

## 2020-03-31 RX ORDER — BISACODYL 5 MG
TABLET, DELAYED RELEASE (ENTERIC COATED) ORAL
Qty: 4 TABLET | Refills: 0 | Status: SHIPPED | OUTPATIENT
Start: 2020-03-31 | End: 2020-06-08

## 2020-03-31 RX ORDER — POLYETHYLENE GLYCOL 3350 17 G/17G
238 POWDER ORAL ONCE
Qty: 238 G | Refills: 0 | Status: SHIPPED | OUTPATIENT
Start: 2020-03-31 | End: 2020-03-31

## 2020-03-31 RX ORDER — METOPROLOL SUCCINATE 50 MG/1
TABLET, EXTENDED RELEASE ORAL
Qty: 270 TABLET | Refills: 3 | Status: SHIPPED | OUTPATIENT
Start: 2020-03-31 | End: 2020-10-01

## 2020-03-31 NOTE — LETTER
[address]   Electrophysiology Follow Up Note              Date:  March 31, 2020  Patient name: Can Kearns  YOB: 1960    Reason for Follow Up:  Palpitations and afib    Requesting Physician:Marlyn Stark DO    HISTORY OF PRESENT ILLNESS: Can Kearns is a 62 y.o. male who presents for evaluation for paroxysmal atrial fibrillation. He has a PMH of DM, PAT, HLD, asthma and PAF. He recently moved back from [place]. He recently wore an event monitor and that showed episodes of afib. Dr. Susannah Larson increased metoprolol was increased on 2/20/2020 to 75 mg daily due to increased heart rates. On 3/10/2020, his EKG today showed SR 91 bpm.    He stated he vapes. He denies drinking alcohol, smoking, or illicit drugs. Interval history since last office visit: He went to ED on 3/12/20 for tachycardia, HTN, and dizziness. He was discharged with no medication changed. His JENNIFER from 3/30/20 noted NSR, PAC's <1 %, PVC's <1%, 1 episode of AT and no afib or aflutter. Today he states he feels better with the increase of metoprolol. He is still having fast heart rate but less frequently. He states that he not getting much exercise. He sits in front of TV or in front of computer mostly everyday. He is taking his medications as prescribed. Patient denies chest pain, sob, dizziness or syncope. EKG today shows SR 90 bpm.      Past Medical History:   has a past medical history of Allergy to environmental factors, Asthma, HTN (hypertension), and Mixed hyperlipidemia. Past Surgical History:   has a past surgical history that includes External ear surgery; Foot surgery; Tonsillectomy; and Adenoidectomy. Allergies:  Penicillins and Codeine    Social History:   reports that he quit smoking about 4 years ago. His smoking use included cigars and cigarettes. He has a 15.00 pack-year smoking history.  He has never used smokeless tobacco. He reports current alcohol use. He reports that he does not use drugs. Family History: family history includes Asthma in his brother; Cancer in his father; Diabetes in his maternal grandmother; Heart Disease in his maternal uncle and mother. Home Medications:    Prior to Admission medications    Medication Sig Start Date End Date Taking?  Authorizing Provider   metoprolol succinate (TOPROL XL) 50 MG extended release tablet Take 2 tablets by mouth daily 3/19/20  Yes Koffi Louis DO   fluticasone (FLONASE) 50 MCG/ACT nasal spray 2 sprays by Each Nostril route daily 3/4/20  Yes Ar Chase DO   apixaban (ELIQUIS) 5 MG TABS tablet Take 1 tablet by mouth 2 times daily 2/7/20  Yes Koffi Louis DO   metFORMIN (GLUCOPHAGE) 500 MG tablet Take 2 tablets by mouth 2 times daily (with meals) 2/7/20  Yes Marlyn Stark DO   lisinopril (PRINIVIL;ZESTRIL) 10 MG tablet TAKE ONE TABLET BY MOUTH DAILY 2/7/20  Yes Marlyn Stark DO   atorvastatin (LIPITOR) 40 MG tablet TAKE 1 TABLET BY MOUTH DAILY AT BEDTIME 2/7/20  Yes Marlyn Stark DO   glipiZIDE (GLUCOTROL) 5 MG tablet Take 1 tablet by mouth 2 times daily 2/7/20  Yes Marlyn Stark DO   fluticasone-vilanterol (BREO ELLIPTA) 100-25 MCG/INH AEPB inhaler Inhale 1 puff into the lungs daily 2/7/20  Yes Marlyn Stark DO   aspirin 81 MG tablet Take 81 mg by mouth daily   Yes Historical Provider, MD   albuterol sulfate (PROAIR RESPICLICK) 992 (90 Base) MCG/ACT aerosol powder inhalation INHALE 2 PUFFS EVERY 6 HOURS AS NEEDED FOR WHEEZING/SHORTNESS OF BREATH 12/2/19  Yes Koffi Louis DO   ergocalciferol (DRISDOL) 22904 units capsule Take 1 capsule by mouth once a week 7/10/18  Yes Marlyn Stark DO   diclofenac (VOLTAREN) 75 MG EC tablet TAKE 1 TABLET TWICE A DAY 4/11/18  Yes Marlyn Stark DO   vitamin D (CHOLECALCIFEROL) 1000 UNIT TABS tablet Take 2,000 Units by mouth 2 times daily    Yes Historical Provider, MD glucose blood VI test strips (ASCENSIA AUTODISC VI;ONE TOUCH ULTRA TEST VI) strip Test blood sugars twice daily 2/26/16  Yes Marlyn Stark DO   [facility] twice daily. Lancets approved by insurance company and patient choice 1/22/16  Yes Karri Salomon,        REVIEW OF SYSTEMS:    All 14-point review of systems are completed and  pertinent positives are mentioned in the history of present illness. Other  systems are reviewed and are negative. Physical Examination:    /70   Pulse 88   Ht 6' 4\" (1.93 m)   Wt 253 lb 8 oz (115 kg)   SpO2 98%   BMI 30.86 kg/m²       Constitutional and General Appearance:    alert, cooperative, no distress and appears stated age  [de-identified]:    PERRLA, no cervical lymphadenopathy. No masses palpable. Normal oral  mucosa  Respiratory:  · Normal excursion and expansion without use of accessory muscles  · Resp Auscultation: Normal breath sounds without dullness or wheezing  Cardiovascular:  · The apical impulse is not displaced  · Heart tones are crisp and normal. regular S1 and S2. Abdomen:  · No masses or tenderness  · Bowel sounds present  Extremities:  ·  No Cyanosis or Clubbing  ·  Lower extremity edema: No  · Skin: Warm and dry  Neurological:  · Alert and oriented. · Moves all extremities well  · No abnormalities of mood, affect, memory, mentation, or behavior are noted    DATA:    ECG:  3/31/20 SR 90 bpm  ECG: 3/10/2010  SR 91 bpm  ECHO: 8/04/2017  Summary   Normal systolic function with an estimated ejection fraction of 60%. Mild concentric left ventricular hypertrophy. No regional wall motion abnormalities are seen. Grade I diastolic dysfunction with normal filing pressure.      [id number] Score for Atrial Fibrillation Stroke Risk   Risk   Factors  Component Value   C CHF No 0   H HTN Yes 1   A2 Age >= 76 No,  (60 y.o.) 0   D DM Yes 1   S2 Prior Stroke/TIA No 0   V Vascular Disease No 0   A Age 74-69 No,  (60 y.o.) 0   Sc Sex male 0 [id number]  Score  2   Score last updated 3/9/20 29:97 AM EDT    Click here for a link to the UpToDate guideline \"Atrial Fibrillation: Anticoagulation therapy to prevent embolization    Disclaimer: Risk Score calculation is dependent on accuracy of patient problem list and past encounter diagnosis. IMPRESSION:    1. Paroxysmal atrial [id number]-[id number] Score 2 (DM and HTN)  03/10/2020  Patient is a 78-year-old male with a medical history significant for diabetes, asthma, hypertension, and paroxysmal atrial fibrillation who presents with recurrent episodes of palpitations and tachycardia with heart rates in the 120s to 130s. Patient was diagnosed with atrial fibrillation based on a monitor back in 2016 that showed a low burden of atrial fibrillation approximately 0.5%. Since then he has been on anticoagulation and rate control. He is compliant with all his medications. He denies history of stroke. He was referred to me because of a resting heart rate with mild activity in the 120s to 130s mistyped being on a beta-blocker. At this point I am unclear what his tachycardia is from. He does have a history of atrial tachycardia before in the past.  In order to see what rhythm he is in during these episodes I would like to offer him a one-week monitor since episodes occur daily. Patient will return monitor and we will follow-up with him as far as the results of the monitor. Ending on the results we will determine whether or not to pursue more aggressive therapy. 03/31/2020  Patient presents for follow-up. He was recently seen in the emergency room with tachycardia/palpitations. He was discharged for follow-up. His monitor shows atrial tachycardia without any atrial fibrillation. We discussed increasing his rate control agents. Patient would like to try this before any other intervention. He will follow-up with me in 3 to 6 months.     2.  Paroxysmal atrial tachycardia  Plan as per above.    RECOMMENDATIONS:  1. Medications discussed. EP study and ablation discussed. 2.  We will increase his beta-blocker.      QUALITY MEASURES  1. Tobacco Cessation Counseling: NA  2. Retake of BP if >140/90:   Yes  3. Documentation to PCP/referring for new patient:  Sent to PCP at close of office visit  4. CAD patient on anti-platelet: No  5. CAD patient on STATIN therapy:  No  6. Patient with CHF and aFib on anticoagulation: Yes      All questions and concerns were addressed to the patient/family. Alternatives to my treatment were discussed.     RECOMMENDATIONS:  1. Increase metoprolol succinate to take 100mg in the morning and 50mg at night. 2.  Follow up 6 months    QUALITY MEASURES  1. Tobacco Cessation Counseling: NA  2. Retake of BP if >140/90:   Yes  3. Documentation to PCP/referring for new patient:  Sent to PCP at close of office visit  4. CAD patient on anti-platelet: No  5. CAD patient on STATIN therapy:  No  6. Patient with CHF and aFib on anticoagulation:  Yes     All questions and concerns were addressed to the patient/family. Alternatives to my treatment were discussed. This note was scribed in the presence of Juana Shearer MD by Neeraj Manzano RN. Dr. Juana Shearer MD  [facility]. [address] [place] [address]. [address]. [facility] [address]  Phone: [phone]  Fax: [phone]     NOTE: This report was transcribed using voice recognition software. Every effort was made to ensure accuracy, however, inadvertent computerized transcription errors may be present. Electronically signed by Neeraj Manzano RN on 3/31/2020 at 9:43 AM     The scribe's documentation has been prepared under my direction and personally reviewed by me in its entirety. I confirm that the note above accurately reflects all work, physical examination, the discussion of treatments and procedures, and medical decision making performed by me.

## 2020-03-31 NOTE — PROGRESS NOTES
[address] ,  [facility]  Phone: [phone]    CHIEF COMPLAINT     Chief Complaint   Patient presents with    New Patient     Colonoscopy on Eliquis r/b Dr. Louis Dubose. Had colon 2009         TELEHEALTH EVALUATION -- Audio/Visual (During AKZEP-42 public health emergency)    HPI:    Patient has requested an audio/video evaluation for the following concern(s):  63 YO M referred for screening colonoscopy, visit required due to multiple comorbidities and patient on Eliquis for anticoagulation. PMH of paroxysmal atrial tachycardia, DM, PAT, HLD, asthma and PAF. Patient states at this time he does not have any GI symptoms, was having some constipation from multiple med changes but says this has resolved now, having regular bms, no blood in the stools. Denies family history of colon cancer in first degree family members. Prior colonoscopy was reportedly negative per pt in 2009. Heart issues pt says are currently stable.     PAST MEDICAL HISTORY     Past Medical History:   Diagnosis Date    Allergy to environmental factors     Asthma     HTN (hypertension) 8/16/2016    Mixed hyperlipidemia 8/16/2016     FAMILY HISTORY     Family History   Problem Relation Age of Onset    Heart Disease Mother     Cancer Father     Asthma Brother     Heart Disease Maternal Uncle     Diabetes Maternal Grandmother      SOCIAL HISTORY     Social History     Socioeconomic History    Marital status:      Spouse name: Not on file    Number of children: Not on file    Years of education: Not on file    Highest education level: Not on file   Occupational History    Not on file   Social Needs    Financial resource strain: Not on file    Food insecurity     Worry: Not on file     Inability: Not on file    Transportation needs     Medical: Not on file     Non-medical: Not on file   Tobacco Use    Smoking status: Former Smoker     Packs/day: 1.00     Years: authority and the [employer] and [employer] Act, this Virtual  Visit was conducted, with patient's consent, to reduce the patient's risk of exposure to COVID-19 and provide continuity of care for an established patient. Services were provided through a video synchronous discussion virtually to substitute for in-person clinic visit.

## 2020-04-30 PROBLEM — Z12.11 COLON CANCER SCREENING: Status: RESOLVED | Noted: 2020-03-31 | Resolved: 2020-04-30

## 2020-05-07 ENCOUNTER — VIRTUAL VISIT (OUTPATIENT)
Dept: FAMILY MEDICINE CLINIC | Age: 60
End: 2020-05-07
Payer: COMMERCIAL

## 2020-05-07 PROCEDURE — 99214 OFFICE O/P EST MOD 30 MIN: CPT | Performed by: FAMILY MEDICINE

## 2020-05-07 NOTE — PROGRESS NOTES
Jae Sosa is a 62 y.o. male    Chief Complaint   Patient presents with    Diabetes     111 bs this morning    Hypertension    Hyperlipidemia       HPI:    This is a video visit. Consent obtained. Patient at home. Diabetes   He presents for his follow-up diabetic visit. He has type 2 diabetes mellitus. His disease course has been stable. Pertinent negatives for diabetes include no polydipsia. Diabetic complications include heart disease. Risk factors for coronary artery disease include diabetes mellitus, hypertension, male sex and obesity. When asked about current treatments, none were reported. An ACE inhibitor/angiotensin II receptor blocker is not being taken. Hypertension   This is a chronic problem. The current episode started more than 1 year ago. The problem is unchanged. The problem is controlled. Risk factors for coronary artery disease include diabetes mellitus, male gender and obesity. Past treatments include ACE inhibitors and beta blockers. The current treatment provides significant improvement. There are no compliance problems. Hypertensive end-organ damage includes CAD/MI. There is no history of kidney disease. Hyperlipidemia   This is a chronic problem. The current episode started more than 1 year ago. The problem is controlled. Recent lipid tests were reviewed and are low. Exacerbating diseases include diabetes. Pertinent negatives include no myalgias. Current antihyperlipidemic treatment includes statins. The current treatment provides moderate improvement of lipids. Compliance problems include medication cost.  Risk factors for coronary artery disease include hypertension, male sex, obesity and diabetes mellitus. ROS:    Review of Systems   Endocrine: Negative for polydipsia. Musculoskeletal: Negative for myalgias. There were no vitals taken for this visit. Physical Exam:    Physical Exam  Constitutional:       General: He is not in acute distress. Appearance: Normal appearance. He is obese. He is not ill-appearing. Neurological:      Mental Status: He is alert. Psychiatric:         Mood and Affect: Mood normal.         Behavior: Behavior normal.         Thought Content: Thought content normal.         Current Outpatient Medications   Medication Sig Dispense Refill    metoprolol succinate (TOPROL XL) 50 MG extended release tablet Take 100mg in the morning and 50 mg at night 270 tablet 3    bisacodyl (BISACODYL) 5 MG EC tablet Take 4 tablets of Bisacodyl for colonoscopy prep as directed. 4 tablet 0    fluticasone (FLONASE) 50 MCG/ACT nasal spray 2 sprays by Each Nostril route daily 3 Bottle 1    apixaban (ELIQUIS) 5 MG TABS tablet Take 1 tablet by mouth 2 times daily 180 tablet 3    metFORMIN (GLUCOPHAGE) 500 MG tablet Take 2 tablets by mouth 2 times daily (with meals) 120 tablet 5    lisinopril (PRINIVIL;ZESTRIL) 10 MG tablet TAKE ONE TABLET BY MOUTH DAILY 90 tablet 1    atorvastatin (LIPITOR) 40 MG tablet TAKE 1 TABLET BY MOUTH DAILY AT BEDTIME 90 tablet 1    glipiZIDE (GLUCOTROL) 5 MG tablet Take 1 tablet by mouth 2 times daily 60 tablet 3    fluticasone-vilanterol (BREO ELLIPTA) 100-25 MCG/INH AEPB inhaler Inhale 1 puff into the lungs daily 30 each 3    aspirin 81 MG tablet Take 81 mg by mouth daily      albuterol sulfate (PROAIR RESPICLICK) 825 (90 Base) MCG/ACT aerosol powder inhalation INHALE 2 PUFFS EVERY 6 HOURS AS NEEDED FOR WHEEZING/SHORTNESS OF BREATH 1 Inhaler 11    ergocalciferol (DRISDOL) 65410 units capsule Take 1 capsule by mouth once a week 8 capsule 0    diclofenac (VOLTAREN) 75 MG EC tablet TAKE 1 TABLET TWICE A DAY 30 tablet 1    vitamin D (CHOLECALCIFEROL) 1000 UNIT TABS tablet Take 2,000 Units by mouth 2 times daily       glucose blood VI test strips (ASCENSIA AUTODISC VI;ONE TOUCH ULTRA TEST VI) strip Test blood sugars twice daily 100 each 99    [facility] Check twice daily.  Lancets approved by insurance company and patient choice 100 each 3     No current facility-administered medications for this visit. Assessment:    1. Type 2 diabetes mellitus without complication, without long-term current use of insulin ([address]    2. Essential hypertension    3. Mixed hyperlipidemia    4. Vitamin D deficiency        Plan:    1. Type 2 diabetes mellitus without complication, without long-term current use of insulin ([facility])  Stable. Continue current medications. - metFORMIN (GLUCOPHAGE) 500 MG tablet; Take 2 tablets by mouth 2 times daily (with meals)  Dispense: 120 tablet; Refill: 5  - glipiZIDE (GLUCOTROL) 5 MG tablet; Take 1 tablet by mouth 2 times daily  Dispense: 60 tablet; Refill: 3    2. Essential hypertension  Stable. Continue current medications. - lisinopril (PRINIVIL;ZESTRIL) 10 MG tablet; TAKE ONE TABLET BY MOUTH DAILY  Dispense: 90 tablet; Refill: 1    3. Mixed hyperlipidemia  Resume Lipitor.  - atorvastatin (LIPITOR) 40 MG tablet; TAKE 1 TABLET BY MOUTH DAILY AT BEDTIME  Dispense: 90 tablet; Refill: 1      Return in about 3 months (around 8/7/2020) for Diabetes, Hypertension, Hyperlipidemia.

## 2020-06-08 ENCOUNTER — HOSPITAL ENCOUNTER (OUTPATIENT)
Age: 60
Setting detail: OBSERVATION
Discharge: HOME | End: 2020-06-10
Attending: PEDIATRICS | Admitting: PEDIATRICS
Payer: COMMERCIAL

## 2020-06-08 ENCOUNTER — APPOINTMENT (OUTPATIENT)
Dept: GENERAL RADIOLOGY | Age: 60
End: 2020-06-08
Payer: COMMERCIAL

## 2020-06-08 ENCOUNTER — APPOINTMENT (OUTPATIENT)
Dept: CT IMAGING | Age: 60
End: 2020-06-08
Payer: COMMERCIAL

## 2020-06-08 ENCOUNTER — NURSE TRIAGE (OUTPATIENT)
Dept: OTHER | Facility: CLINIC | Age: 60
End: 2020-06-08

## 2020-06-08 ENCOUNTER — HOSPITAL ENCOUNTER (EMERGENCY)
Age: 60
Discharge: ANOTHER HOSPITAL | End: 2020-06-08
Attending: EMERGENCY MEDICINE
Payer: COMMERCIAL

## 2020-06-08 VITALS
SYSTOLIC BLOOD PRESSURE: 161 MMHG | DIASTOLIC BLOOD PRESSURE: 89 MMHG | WEIGHT: 259 LBS | HEART RATE: 82 BPM | BODY MASS INDEX: 31.54 KG/M2 | TEMPERATURE: 97.3 F | RESPIRATION RATE: 18 BRPM | OXYGEN SATURATION: 98 % | HEIGHT: 76 IN

## 2020-06-08 PROBLEM — I63.529 ACUTE ISCHEMIC MULTIFOCAL ANTERIOR CIRCULATION STROKE (HCC): Status: ACTIVE | Noted: 2020-06-08

## 2020-06-08 LAB
A/G RATIO: 1.7 (ref 1.1–2.2)
ALBUMIN SERPL-MCNC: 4 G/DL (ref 3.4–5)
ALP BLD-CCNC: 71 U/L (ref 40–129)
ALT SERPL-CCNC: 25 U/L (ref 10–40)
ANION GAP SERPL CALCULATED.3IONS-SCNC: 11 MMOL/L (ref 3–16)
AST SERPL-CCNC: 14 U/L (ref 15–37)
BASOPHILS ABSOLUTE: 0 K/UL (ref 0–0.2)
BASOPHILS RELATIVE PERCENT: 0.7 %
BILIRUB SERPL-MCNC: 0.3 MG/DL (ref 0–1)
BUN BLDV-MCNC: 20 MG/DL (ref 7–20)
CALCIUM SERPL-MCNC: 8.6 MG/DL (ref 8.3–10.6)
CHLORIDE BLD-SCNC: 103 MMOL/L (ref 99–110)
CO2: 24 MMOL/L (ref 21–32)
CREAT SERPL-MCNC: 0.7 MG/DL (ref 0.8–1.3)
EOSINOPHILS ABSOLUTE: 0.2 K/UL (ref 0–0.6)
EOSINOPHILS RELATIVE PERCENT: 4.2 %
GFR AFRICAN AMERICAN: >60
GFR NON-AFRICAN AMERICAN: >60
GLOBULIN: 2.4 G/DL
GLUCOSE BLD-MCNC: 144 MG/DL (ref 70–99)
GLUCOSE BLD-MCNC: 163 MG/DL (ref 70–99)
HCT VFR BLD CALC: 43.1 % (ref 40.5–52.5)
HEMOGLOBIN: 14.4 G/DL (ref 13.5–17.5)
INR BLD: 1.23 (ref 0.86–1.14)
LYMPHOCYTES ABSOLUTE: 1.2 K/UL (ref 1–5.1)
LYMPHOCYTES RELATIVE PERCENT: 25.1 %
MCH RBC QN AUTO: 30.1 PG (ref 26–34)
MCHC RBC AUTO-ENTMCNC: 33.3 G/DL (ref 31–36)
MCV RBC AUTO: 90.5 FL (ref 80–100)
MONOCYTES ABSOLUTE: 0.3 K/UL (ref 0–1.3)
MONOCYTES RELATIVE PERCENT: 7 %
NEUTROPHILS ABSOLUTE: 3 K/UL (ref 1.7–7.7)
NEUTROPHILS RELATIVE PERCENT: 63 %
PDW BLD-RTO: 13.8 % (ref 12.4–15.4)
PERFORMED ON: ABNORMAL
PLATELET # BLD: 232 K/UL (ref 135–450)
PMV BLD AUTO: 7.1 FL (ref 5–10.5)
POTASSIUM REFLEX MAGNESIUM: 4.2 MMOL/L (ref 3.5–5.1)
PROTHROMBIN TIME: 14.3 SEC (ref 10–13.2)
RBC # BLD: 4.77 M/UL (ref 4.2–5.9)
SODIUM BLD-SCNC: 138 MMOL/L (ref 136–145)
TOTAL PROTEIN: 6.4 G/DL (ref 6.4–8.2)
TROPONIN: <0.01 NG/ML
WBC # BLD: 4.8 K/UL (ref 4–11)

## 2020-06-08 PROCEDURE — 71045 X-RAY EXAM CHEST 1 VIEW: CPT

## 2020-06-08 PROCEDURE — 6370000000 HC RX 637 (ALT 250 FOR IP): Performed by: PEDIATRICS

## 2020-06-08 PROCEDURE — 6360000004 HC RX CONTRAST MEDICATION: Performed by: EMERGENCY MEDICINE

## 2020-06-08 PROCEDURE — G0378 HOSPITAL OBSERVATION PER HR: HCPCS

## 2020-06-08 PROCEDURE — 85610 PROTHROMBIN TIME: CPT

## 2020-06-08 PROCEDURE — 99285 EMERGENCY DEPT VISIT HI MDM: CPT

## 2020-06-08 PROCEDURE — 70450 CT HEAD/BRAIN W/O DYE: CPT

## 2020-06-08 PROCEDURE — 84484 ASSAY OF TROPONIN QUANT: CPT

## 2020-06-08 PROCEDURE — 85025 COMPLETE CBC W/AUTO DIFF WBC: CPT

## 2020-06-08 PROCEDURE — 80053 COMPREHEN METABOLIC PANEL: CPT

## 2020-06-08 PROCEDURE — 70498 CT ANGIOGRAPHY NECK: CPT

## 2020-06-08 PROCEDURE — G0379 DIRECT REFER HOSPITAL OBSERV: HCPCS

## 2020-06-08 PROCEDURE — 93005 ELECTROCARDIOGRAM TRACING: CPT | Performed by: EMERGENCY MEDICINE

## 2020-06-08 RX ORDER — ASPIRIN 300 MG/1
300 SUPPOSITORY RECTAL DAILY
Status: DISCONTINUED | OUTPATIENT
Start: 2020-06-09 | End: 2020-06-10 | Stop reason: HOSPADM

## 2020-06-08 RX ORDER — GLIPIZIDE 5 MG/1
5 TABLET ORAL 2 TIMES DAILY
Qty: 180 TABLET | Refills: 1 | Status: SHIPPED | OUTPATIENT
Start: 2020-06-08 | End: 2020-10-01 | Stop reason: SDUPTHER

## 2020-06-08 RX ORDER — BUDESONIDE AND FORMOTEROL FUMARATE DIHYDRATE 160; 4.5 UG/1; UG/1
2 AEROSOL RESPIRATORY (INHALATION) 2 TIMES DAILY
Status: DISCONTINUED | OUTPATIENT
Start: 2020-06-08 | End: 2020-06-10 | Stop reason: HOSPADM

## 2020-06-08 RX ORDER — PROMETHAZINE HYDROCHLORIDE 25 MG/1
12.5 TABLET ORAL EVERY 6 HOURS PRN
Status: DISCONTINUED | OUTPATIENT
Start: 2020-06-08 | End: 2020-06-10 | Stop reason: HOSPADM

## 2020-06-08 RX ORDER — METOPROLOL SUCCINATE 50 MG/1
50 TABLET, EXTENDED RELEASE ORAL DAILY
Status: DISCONTINUED | OUTPATIENT
Start: 2020-06-09 | End: 2020-06-10 | Stop reason: HOSPADM

## 2020-06-08 RX ORDER — ONDANSETRON 2 MG/ML
4 INJECTION INTRAMUSCULAR; INTRAVENOUS EVERY 6 HOURS PRN
Status: DISCONTINUED | OUTPATIENT
Start: 2020-06-08 | End: 2020-06-10 | Stop reason: HOSPADM

## 2020-06-08 RX ORDER — ATORVASTATIN CALCIUM 40 MG/1
40 TABLET, FILM COATED ORAL DAILY
Status: DISCONTINUED | OUTPATIENT
Start: 2020-06-09 | End: 2020-06-10 | Stop reason: HOSPADM

## 2020-06-08 RX ORDER — ASPIRIN 81 MG/1
81 TABLET ORAL DAILY
Status: DISCONTINUED | OUTPATIENT
Start: 2020-06-09 | End: 2020-06-10 | Stop reason: HOSPADM

## 2020-06-08 RX ORDER — ALBUTEROL SULFATE 2.5 MG/3ML
2.5 SOLUTION RESPIRATORY (INHALATION) EVERY 4 HOURS PRN
Status: DISCONTINUED | OUTPATIENT
Start: 2020-06-08 | End: 2020-06-10 | Stop reason: HOSPADM

## 2020-06-08 RX ORDER — SODIUM CHLORIDE 0.9 % (FLUSH) 0.9 %
10 SYRINGE (ML) INJECTION EVERY 12 HOURS SCHEDULED
Status: DISCONTINUED | OUTPATIENT
Start: 2020-06-08 | End: 2020-06-10 | Stop reason: HOSPADM

## 2020-06-08 RX ORDER — POLYETHYLENE GLYCOL 3350 17 G/17G
17 POWDER, FOR SOLUTION ORAL DAILY PRN
Status: DISCONTINUED | OUTPATIENT
Start: 2020-06-08 | End: 2020-06-10 | Stop reason: HOSPADM

## 2020-06-08 RX ORDER — SODIUM CHLORIDE 0.9 % (FLUSH) 0.9 %
10 SYRINGE (ML) INJECTION PRN
Status: DISCONTINUED | OUTPATIENT
Start: 2020-06-08 | End: 2020-06-10 | Stop reason: HOSPADM

## 2020-06-08 RX ORDER — FLUTICASONE FUROATE AND VILANTEROL TRIFENATATE 100; 25 UG/1; UG/1
1 POWDER RESPIRATORY (INHALATION) DAILY
Qty: 30 EACH | Refills: 5 | Status: SHIPPED | OUTPATIENT
Start: 2020-06-08

## 2020-06-08 RX ORDER — LISINOPRIL 10 MG/1
10 TABLET ORAL DAILY
Status: DISCONTINUED | OUTPATIENT
Start: 2020-06-08 | End: 2020-06-10 | Stop reason: HOSPADM

## 2020-06-08 RX ADMIN — IOPAMIDOL 85 ML: 755 INJECTION, SOLUTION INTRAVENOUS at 18:48

## 2020-06-08 ASSESSMENT — ENCOUNTER SYMPTOMS
VOMITING: 0
ABDOMINAL PAIN: 0
EYE DISCHARGE: 0
COUGH: 0
SORE THROAT: 0
NAUSEA: 0
BACK PAIN: 0
DIARRHEA: 0

## 2020-06-08 ASSESSMENT — PAIN DESCRIPTION - LOCATION: LOCATION: HEAD

## 2020-06-08 ASSESSMENT — PAIN DESCRIPTION - PAIN TYPE: TYPE: ACUTE PAIN

## 2020-06-08 ASSESSMENT — PAIN SCALES - GENERAL: PAINLEVEL_OUTOF10: 3

## 2020-06-08 NOTE — TELEPHONE ENCOUNTER
Last office visit 3/19/2020     Last written 2-7-2020 x 3 refills    Next office visit scheduled 8/11/2020    Requested Prescriptions     Pending Prescriptions Disp Refills    fluticasone-vilanterol (BREO ELLIPTA) 100-25 MCG/INH AEPB inhaler 30 each 5     Sig: Inhale 1 puff into the lungs daily

## 2020-06-08 NOTE — TELEPHONE ENCOUNTER
Reason for Disposition   Patient sounds very sick or weak to the triager    Answer Assessment - Initial Assessment Questions  1. SYMPTOM: \"What is the main symptom you are concerned about? \" (e.g., weakness, numbness)       Numbness in L hand in 3 fingers, legs begin to shake when standing at times, stumbling, loss for words x1 episode    2. ONSET: \"When did this start? \" (minutes, hours, days; while sleeping)       Friday or Saturday with hand, legs started shaking over the weekend    3. LAST NORMAL: \"When was the last time you were normal (no symptoms)? \"      A couple of weeks ago     4. PATTERN \"Does this come and go, or has it been constant since it started? \"  \"Is it present now? \"       Three fingers are constant and leg shaking is intermittent     5. CARDIAC SYMPTOMS: \"Have you had any of the following symptoms: chest pain, difficulty breathing, palpitations? \"       Shoulder pain every once in a while, reaching out hurts, denies heart rate changes    6. NEUROLOGIC SYMPTOMS: \"Have you had any of the following symptoms: headache, dizziness, vision loss, double vision, changes in speech, unsteady on your feet? \"       Right eye seems blurry at times, a couple of days ago pt had a hard time getting words to come out x1, unsteady when shaking in legs is happening, pt stumbles at times    7. OTHER SYMPTOMS: \"Do you have any other symptoms? \"       Denies    8. PREGNANCY: \"Is there any chance you are pregnant? \" \"When was your last menstrual period? \"      NA    Protocols used: NEUROLOGIC DEFICIT-ADULT-OH    Patient called [facility]  pre-service center [facility]) to schedule appointment, with red flag complaint, transferred to RN access for triage. LVM. Returned call and completed triage. Caller reports symptoms as documented above. Caller informed of disposition. Soft transfer to Yanira Taylor NP with recommendation of ED. Care advice as documented. Pt verbalized understanding and agreeable to plan.      Please

## 2020-06-08 NOTE — ED PROVIDER NOTES
for polydipsia. Genitourinary: Negative for dysuria and urgency. Musculoskeletal: Negative for back pain and myalgias. Skin: Negative for rash. Neurological: Positive for weakness, numbness and headaches. Negative for tremors, syncope and facial asymmetry. Hematological: Does not bruise/bleed easily. Psychiatric/Behavioral: Negative for confusion. Positives and Pertinent negatives as per HPI. Except as noted above in the ROS, all other systems were reviewed and negative.        PAST MEDICAL HISTORY     Past Medical History:   Diagnosis Date    A-fib [facility])     Allergy to environmental factors     Asthma     HTN (hypertension) 8/16/2016    Mixed hyperlipidemia 8/16/2016         SURGICAL HISTORY     Past Surgical History:   Procedure Laterality Date    ADENOIDECTOMY      EXTERNAL EAR SURGERY      multiple ear surgeries    FOOT SURGERY      both    TONSILLECTOMY           CURRENTMEDICATIONS       Discharge Medication List as of 6/8/2020 10:06 PM      CONTINUE these medications which have NOT CHANGED    Details   fluticasone-vilanterol (BREO ELLIPTA) 100-25 MCG/INH AEPB inhaler Inhale 1 puff into the lungs daily, Disp-30 each, R-5Normal      glipiZIDE (GLUCOTROL) 5 MG tablet Take 1 tablet by mouth 2 times daily, Disp-180 tablet, R-1Normal      metoprolol succinate (TOPROL XL) 50 MG extended release tablet Take 100mg in the morning and 50 mg at night, Disp-270 tablet, R-3Normal      fluticasone (FLONASE) 50 MCG/ACT nasal spray 2 sprays by Each Nostril route daily, Disp-3 Bottle, R-1Normal      apixaban (ELIQUIS) 5 MG TABS tablet Take 1 tablet by mouth 2 times daily, Disp-180 tablet, R-3Normal      metFORMIN (GLUCOPHAGE) 500 MG tablet Take 2 tablets by mouth 2 times daily (with meals), Disp-120 tablet, R-5Normal      lisinopril (PRINIVIL;ZESTRIL) 10 MG tablet TAKE ONE TABLET BY MOUTH DAILY, Disp-90 tablet, R-1Normal      atorvastatin (LIPITOR) 40 MG tablet TAKE 1 TABLET BY MOUTH DAILY AT BEDTIME, Protime-INR   Result Value Ref Range    Protime 14.3 (H) 10.0 - 13.2 sec    INR 1.23 (H) 0.86 - 1.14   POCT Glucose   Result Value Ref Range    POC Glucose 144 (H) 70 - 99 mg/dl    Performed on ACCU-CHEK    EKG 12 Lead   Result Value Ref Range    Ventricular Rate 88 BPM    Atrial Rate 88 BPM    P-R Interval 168 ms    QRS Duration 86 ms    Q-T Interval 362 ms    QTc Calculation (Bazett) 438 ms    P Axis 59 degrees    R Axis 43 degrees    T Axis 68 degrees    Diagnosis       Normal sinus rhythmNormal ECGNo previous ECGs availableConfirmed by MILO NAVARRO MD ([id number]) on 6/9/2020 5:39:44 AM       All other labs were within normal range ornot returned as of this dictation. EKG: All EKG's are interpreted by the Emergency Department Physician who either signs or Co-signs this chart in the absence of a cardiologist.  Please see their note for interpretation of EKG. EKG Interpretation    Interpreted by emergency department physician    Rhythm: normal sinus   Rate: normal  Axis: normal  Ectopy: none  Conduction: normal  ST Segments: normal  T Waves: normal  Q Waves: none    Clinical Impression: normal sinus rhythm      RADIOLOGY:   Non-plain film images such as CT, Ultrasound and MRI are read by the radiologist.  Plain radiographic images are visualized and preliminarily interpreted by the ED Provider with the belowfindings:    Interpretation per the Radiologist below, if available at the time of this note:    CTA HEAD NECK W CONTRAST   Final Result   Moderate to severe stenosis of the proximal M1 segment of the right MCA. No large vessel occlusion visualized within the head or neck. CT HEAD WO CONTRAST   Final Result   No acute intracranial abnormality. XR CHEST PORTABLE   Final Result   No acute process.                PROCEDURES   Unless otherwise noted below, none     Procedures    CRITICAL CARE TIME   N/A    CONSULTS:  IP CONSULT TO PHARMACY  PHARMACY TO CHANGE BASE FLUIDS  IP CONSULT TO stenosis, right          DISPOSITION/PLAN   DISPOSITION Decision To Transfer 06/08/2020 08:12:39 PM    (Please note that portions of this note were completed with a voice recognition program.  Efforts were made to edit the dictations but occasionally words are mis-transcribed.)    Rafiq Sifuentes MD(electronically signed)              Rafiq Sifuentes MD  06/09/20 5955

## 2020-06-09 ENCOUNTER — APPOINTMENT (OUTPATIENT)
Dept: MRI IMAGING | Age: 60
End: 2020-06-09
Attending: PEDIATRICS
Payer: COMMERCIAL

## 2020-06-09 LAB
CHOLESTEROL, TOTAL: 140 MG/DL (ref 0–199)
EKG ATRIAL RATE: 88 BPM
EKG DIAGNOSIS: NORMAL
EKG P AXIS: 59 DEGREES
EKG P-R INTERVAL: 168 MS
EKG Q-T INTERVAL: 362 MS
EKG QRS DURATION: 86 MS
EKG QTC CALCULATION (BAZETT): 438 MS
EKG R AXIS: 43 DEGREES
EKG T AXIS: 68 DEGREES
EKG VENTRICULAR RATE: 88 BPM
ESTIMATED AVERAGE GLUCOSE: 125.5 MG/DL
GLUCOSE BLD-MCNC: 119 MG/DL (ref 70–99)
GLUCOSE BLD-MCNC: 138 MG/DL (ref 70–99)
GLUCOSE BLD-MCNC: 165 MG/DL (ref 70–99)
HBA1C MFR BLD: 6 %
HCT VFR BLD CALC: 41.2 % (ref 40.5–52.5)
HDLC SERPL-MCNC: 36 MG/DL (ref 40–60)
HEMOGLOBIN: 13.7 G/DL (ref 13.5–17.5)
LDL CHOLESTEROL CALCULATED: 89 MG/DL
MCH RBC QN AUTO: 29.8 PG (ref 26–34)
MCHC RBC AUTO-ENTMCNC: 33.3 G/DL (ref 31–36)
MCV RBC AUTO: 89.4 FL (ref 80–100)
PDW BLD-RTO: 13.6 % (ref 12.4–15.4)
PERFORMED ON: ABNORMAL
PLATELET # BLD: 213 K/UL (ref 135–450)
PMV BLD AUTO: 6.9 FL (ref 5–10.5)
RBC # BLD: 4.61 M/UL (ref 4.2–5.9)
SEDIMENTATION RATE, ERYTHROCYTE: 14 MM/HR (ref 0–20)
TOTAL CK: 35 U/L (ref 39–308)
TRIGL SERPL-MCNC: 75 MG/DL (ref 0–150)
VLDLC SERPL CALC-MCNC: 15 MG/DL
WBC # BLD: 4.7 K/UL (ref 4–11)

## 2020-06-09 PROCEDURE — 92610 EVALUATE SWALLOWING FUNCTION: CPT

## 2020-06-09 PROCEDURE — 72141 MRI NECK SPINE W/O DYE: CPT

## 2020-06-09 PROCEDURE — 97162 PT EVAL MOD COMPLEX 30 MIN: CPT

## 2020-06-09 PROCEDURE — 82550 ASSAY OF CK (CPK): CPT

## 2020-06-09 PROCEDURE — 97110 THERAPEUTIC EXERCISES: CPT

## 2020-06-09 PROCEDURE — 70551 MRI BRAIN STEM W/O DYE: CPT

## 2020-06-09 PROCEDURE — 99219 PR INITIAL OBSERVATION CARE/DAY 50 MINUTES: CPT | Performed by: PSYCHIATRY & NEUROLOGY

## 2020-06-09 PROCEDURE — 94640 AIRWAY INHALATION TREATMENT: CPT

## 2020-06-09 PROCEDURE — 2580000003 HC RX 258: Performed by: PEDIATRICS

## 2020-06-09 PROCEDURE — 93010 ELECTROCARDIOGRAM REPORT: CPT | Performed by: INTERNAL MEDICINE

## 2020-06-09 PROCEDURE — 83036 HEMOGLOBIN GLYCOSYLATED A1C: CPT

## 2020-06-09 PROCEDURE — 85652 RBC SED RATE AUTOMATED: CPT

## 2020-06-09 PROCEDURE — 85027 COMPLETE CBC AUTOMATED: CPT

## 2020-06-09 PROCEDURE — 80061 LIPID PANEL: CPT

## 2020-06-09 PROCEDURE — 36415 COLL VENOUS BLD VENIPUNCTURE: CPT

## 2020-06-09 PROCEDURE — 97166 OT EVAL MOD COMPLEX 45 MIN: CPT

## 2020-06-09 PROCEDURE — G0378 HOSPITAL OBSERVATION PER HR: HCPCS

## 2020-06-09 PROCEDURE — 97530 THERAPEUTIC ACTIVITIES: CPT

## 2020-06-09 PROCEDURE — 6370000000 HC RX 637 (ALT 250 FOR IP): Performed by: PEDIATRICS

## 2020-06-09 PROCEDURE — 97116 GAIT TRAINING THERAPY: CPT

## 2020-06-09 RX ORDER — DEXTROSE MONOHYDRATE 50 MG/ML
100 INJECTION, SOLUTION INTRAVENOUS PRN
Status: DISCONTINUED | OUTPATIENT
Start: 2020-06-09 | End: 2020-06-10 | Stop reason: HOSPADM

## 2020-06-09 RX ORDER — NICOTINE POLACRILEX 4 MG
15 LOZENGE BUCCAL PRN
Status: DISCONTINUED | OUTPATIENT
Start: 2020-06-09 | End: 2020-06-10 | Stop reason: HOSPADM

## 2020-06-09 RX ORDER — DEXTROSE MONOHYDRATE 25 G/50ML
12.5 INJECTION, SOLUTION INTRAVENOUS PRN
Status: DISCONTINUED | OUTPATIENT
Start: 2020-06-09 | End: 2020-06-10 | Stop reason: HOSPADM

## 2020-06-09 RX ORDER — INSULIN GLARGINE 100 [IU]/ML
5 INJECTION, SOLUTION SUBCUTANEOUS NIGHTLY
Status: DISCONTINUED | OUTPATIENT
Start: 2020-06-09 | End: 2020-06-10 | Stop reason: HOSPADM

## 2020-06-09 RX ADMIN — ATORVASTATIN CALCIUM 40 MG: 40 TABLET, FILM COATED ORAL at 09:50

## 2020-06-09 RX ADMIN — METOPROLOL SUCCINATE 50 MG: 50 TABLET, EXTENDED RELEASE ORAL at 09:50

## 2020-06-09 RX ADMIN — Medication 10 ML: at 20:48

## 2020-06-09 RX ADMIN — LISINOPRIL 10 MG: 10 TABLET ORAL at 09:50

## 2020-06-09 RX ADMIN — APIXABAN 5 MG: 5 TABLET, FILM COATED ORAL at 00:00

## 2020-06-09 RX ADMIN — APIXABAN 5 MG: 5 TABLET, FILM COATED ORAL at 20:48

## 2020-06-09 RX ADMIN — ASPIRIN 81 MG: 81 TABLET, COATED ORAL at 09:50

## 2020-06-09 RX ADMIN — Medication 10 ML: at 00:00

## 2020-06-09 RX ADMIN — LISINOPRIL 10 MG: 10 TABLET ORAL at 00:00

## 2020-06-09 RX ADMIN — INSULIN GLARGINE 5 UNITS: 100 INJECTION, SOLUTION SUBCUTANEOUS at 20:48

## 2020-06-09 RX ADMIN — APIXABAN 5 MG: 5 TABLET, FILM COATED ORAL at 09:50

## 2020-06-09 RX ADMIN — Medication 2 PUFF: at 19:19

## 2020-06-09 RX ADMIN — Medication 10 ML: at 09:51

## 2020-06-09 ASSESSMENT — PAIN SCALES - GENERAL
PAINLEVEL_OUTOF10: 4
PAINLEVEL_OUTOF10: 4

## 2020-06-09 ASSESSMENT — PAIN DESCRIPTION - PAIN TYPE
TYPE: ACUTE PAIN
TYPE: ACUTE PAIN

## 2020-06-09 ASSESSMENT — PAIN DESCRIPTION - LOCATION
LOCATION: HEAD
LOCATION: ARM

## 2020-06-09 ASSESSMENT — PAIN - FUNCTIONAL ASSESSMENT: PAIN_FUNCTIONAL_ASSESSMENT: PREVENTS OR INTERFERES SOME ACTIVE ACTIVITIES AND ADLS

## 2020-06-09 ASSESSMENT — PAIN DESCRIPTION - ORIENTATION: ORIENTATION: RIGHT;LEFT

## 2020-06-09 NOTE — H&P
Hospital Medicine History & Physical      PCP: Araceli Franco DO    Date of Admission: 6/8/2020    Date of Service: Pt seen/examined on 6/8/2020    Chief Complaint: Arm numbness      History Of Present Illness:    62 y.o. male with medical history of high blood pressure, hyperlipidemia, atrial fibrillation on Eliquis presents for complaints of several days of seeming weakness in legs bilaterally with multiple episodes of stumbling, episode of difficulty thinking with word finding difficulties, left arm weakness and numbness. Also complains of occasional left frontotemporal headache and perioral numbness. Further complains of difficulty getting urination noticeable in the past few days. No incontinence. Feels like still has trouble with walking in the left arm at this point. No previous history of stroke or myocardial infarction. Given persistence of symptoms and after discussing with primary care provider patient presented to [facility] ED. Patient presented initially to [facility] as above. Overall work-up there highlighted is notable for a CTA head showing moderate to severe focal stenosis of proximal M1 segment of right middle cerebral artery. CT head without contrast unremarkable labs unremarkable. Patient transferred to [facility] for stroke work-up. Patient will be on TPA window    Past Medical History:        Diagnosis Date    [facility])     Allergy to environmental factors     Asthma     HTN (hypertension) 8/16/2016    Mixed hyperlipidemia 8/16/2016       Past Surgical History:        Procedure Laterality Date    ADENOIDECTOMY      EXTERNAL EAR SURGERY      multiple ear surgeries    FOOT SURGERY      both    TONSILLECTOMY         Medications Prior to Admission:   Prior to Admission medications    Medication Sig Start Date End Date Taking?  Authorizing Provider   fluticasone-vilanterol (BREO ELLIPTA) 100-25 MCG/INH AEPB inhaler Inhale 1 puff into the lungs daily 6/8/20   Marlyn DO Mi   glipiZIDE (GLUCOTROL) 5 MG tablet Take 1 tablet by mouth 2 times daily 6/8/20   Marlyn Stark DO   metoprolol succinate (TOPROL XL) 50 MG extended release tablet Take 100mg in the morning and 50 mg at night 3/31/20   Magen Giles MD   fluticasone [facility]) 50 MCG/ACT nasal spray 2 sprays by Each Nostril route daily  Patient taking differently: 2 sprays by Each Nostril route daily as needed  3/4/20   Ar Chase DO   apixaban (ELIQUIS) 5 MG TABS tablet Take 1 tablet by mouth 2 times daily 2/7/20   Marlyn Stark DO   metFORMIN (GLUCOPHAGE) 500 MG tablet Take 2 tablets by mouth 2 times daily (with meals) 2/7/20   Marlyn Stark DO   lisinopril (PRINIVIL;ZESTRIL) 10 MG tablet TAKE ONE TABLET BY MOUTH DAILY 2/7/20   Marlyn Stark DO   atorvastatin (LIPITOR) 40 MG tablet TAKE 1 TABLET BY MOUTH DAILY AT BEDTIME 2/7/20   Marlyn Stark DO   aspirin 81 MG tablet Take 81 mg by mouth daily    Historical Provider, MD   albuterol sulfate (PROAIR RESPICLICK) 061 (90 Base) MCG/ACT aerosol powder inhalation INHALE 2 PUFFS EVERY 6 HOURS AS NEEDED FOR WHEEZING/SHORTNESS OF BREATH 12/2/19   Marlyn Stark DO   diclofenac (VOLTAREN) 75 MG EC tablet TAKE 1 TABLET TWICE A DAY 4/11/18   Marlyn Stark DO   vitamin D (CHOLECALCIFEROL) 1000 UNIT TABS tablet Take 2,000 Units by mouth 2 times daily     Historical Provider, MD   glucose blood VI test strips (ASCENSIA AUTODISC VI;ONE TOUCH ULTRA TEST VI) strip Test blood sugars twice daily 2/26/16   Marlyn Stark DO   [facility] LANCETS ORIGINAL MISC Check twice daily. Lancets approved by insurance company and patient choice 1/22/16   Marlyn Stark DO       Allergies:  Penicillins and Codeine    Social History:      The patient currently lives at home and his mom lives with him.     TOBACCO: Quit 5 years ago and was otherwise pack-a-day smoker prior  ETOH: Couple alcoholic beverages month  DRUGS: Denies      Family History:      Positive as follows:        Problem

## 2020-06-09 NOTE — PROGRESS NOTES
Physical Therapy    Facility/Department: [facility] C5 - MED SURG/ORTHO  Initial Assessment, treatment, DC summary    NAME: Jone West  : 1960  MRN: [id number]    Date of Service: 2020    Discharge Recommendations:  Home with assist PRN, Outpatient PT   PT Equipment Recommendations  Equipment Needed: No    Assessment   Assessment: Pt refrred for PT evaluation during current hospital stay with diagnosis of R/O CVA. Pt currently functioning very near baseline, at mod i to indep for all mobility assessed. No further PT needed in acute setting. Recommend home with PRN assist and OPPT for strengthening at DC from acute setting. Prognosis: Good  Decision Making: Medium Complexity  PT Education: Goals;Transfer Training;PT Role;Functional Mobility Training;Home Exercise Program;Gait Training  Patient Education: handout given for seated and supine LE ther ex. Pt demonstrated and/or verbalized understanding  Barriers to Learning: no  No Skilled PT: Independent with functional mobility   REQUIRES PT FOLLOW UP: No  Activity Tolerance  Activity Tolerance: Patient Tolerated treatment well       Patient Diagnosis(es): There were no encounter diagnoses. has a past medical history of A-fib ([address] Allergy to environmental factors, Asthma, HTN (hypertension), and Mixed hyperlipidemia. has a past surgical history that includes External ear surgery; Foot surgery; Tonsillectomy; and Adenoidectomy.     Restrictions  Restrictions/Precautions  Restrictions/Precautions: General Precautions, Fall Risk  Position Activity Restriction  Other position/activity restrictions: telemetry,   Vision/Hearing  Vision: Impaired  Vision Exceptions: Wears glasses for reading  Hearing: Exceptions to [facility]  Hearing Exceptions: Hard of hearing/hearing concerns     Subjective  General  Chart Reviewed: Yes  Patient assessed for rehabilitation services?: Yes  Response To Previous Treatment: Not applicable  Family / Caregiver Present: No  Referring Practitioner: Emily Schaffer MD  Referral Date : 06/08/20  Diagnosis: CVA  Follows Commands: Within Functional Limits  General Comment  Comments: Pt resting in bed upon entry, RN cleared pt for therapy  Subjective  Subjective: Pt agreeable to PT, reposts headache at 4/10  Pain Screening  Patient Currently in Pain: Yes  Pain Assessment  Pain Assessment: 0-10  Pain Level: 4  Pain Type: Acute pain  Pain Location: Head  Vital Signs  Patient Currently in Pain: Yes  Pre Treatment Pain Screening  Intervention List: Patient able to continue with treatment    Orientation  Orientation  Overall Orientation Status: Within Normal Limits  Social/Functional History  Social/Functional History  Lives With: Family(Mother lives with patient)  Type of Home: Mobile home  Home Layout: One level  Home Access: Stairs to enter with rails  Entrance Stairs - Number of Steps: 4  Entrance Stairs - Rails: Both  Bathroom Shower/Tub: Tub/Shower unit  Bathroom Toilet: Standard  ADL Assistance: Independent  Homemaking Assistance: Independent  Homemaking Responsibilities: Yes  Meal Prep Responsibility: Secondary  Laundry Responsibility: Primary  Cleaning Responsibility: Primary  Shopping Responsibility: Primary  Ambulation Assistance: Independent(without AD )  Transfer Assistance: Independent  Active : Yes  Occupation: Part time employment  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  [address] [place] [address] Avenue: golf, target shooting, visit sons         Objective          AROM RLE (degrees)  RLE AROM: WNL  AROM LLE (degrees)  LLE AROM : WNL  Strength RLE  Strength RLE: WNL  Comment: grossly 5/5 throughout  Strength LLE  Comment: inconsistent MMT results with cogwheel feel, grossly observed to be at least 4-/5 throughout     Sensation  Overall Sensation Status: Impaired(pt reports N/ T LLE)  Bed mobility  Supine to Sit: Modified independent(with HOB slightly elevated)  Sit to Supine: Modified independent  Scooting: Independent  Transfers  Sit to Timed Code Treatment Minutes: Angie 13, PT

## 2020-06-09 NOTE — CONSULTS
mL Intravenous PRN Jairon Díaz MD        polyethylene glycol (GLYCOLAX) packet 17 g  17 g Oral Daily PRN Jairon Díaz MD        promethazine (PHENERGAN) tablet 12.5 mg  12.5 mg Oral Q6H PRN Jairon Díaz MD        Or    ondansetron (ZOFRAN) injection 4 mg  4 mg Intravenous Q6H PRN Jairon Díaz MD        aspirin EC tablet 81 mg  81 mg Oral Daily Jorge Rojsa MD   81 mg at 06/09/20 3408    Or    aspirin suppository 300 mg  300 mg Rectal Daily Jorge Rojas MD           ROS : A 10-14 system review of constitutional, cardiovascular, respiratory, eyes, musculoskeletal, endocrine, GI, ENT, skin, hematological, genitourinary, psychiatric and neurologic systems was obtained and updated today and is unremarkable except as mentioned in my HPI      Exam:     Constitutional:   Vitals:    06/08/20 2245 06/08/20 2300 06/09/20 0328 06/09/20 0825   BP: (!) 187/92  (!) 159/90 (!) 159/83   Pulse: 83  82 82   Resp: 16  15 16   Temp: 98 °F (36.7 °C)  97.9 °F (36.6 °C) 98 °F (36.7 °C)   TempSrc: Oral  Oral Oral   SpO2: 96%   94%   Weight:  259 lb (117.5 kg)     Height:  6' 4\" (1.93 m)         General appearance:  Normal development and appear in no acute distress. Eye: No icterus. Fundus: Funduscopic examination cannot be performed due to COVID19 restrictions and precautions. Neck: supple  Cardiovascular: No lower leg edema with good pulsation. Mental Status:   Oriented to person, place, problem, and time. Memory: Aware of recent and remote event. Good immediate recall. Intact remote memory  Normal attention span and concentration. Language: intact naming, repeating and fluency   Good fund of Knowledge. Aware of current events and vocabulary   Cranial Nerves:   II: Visual fields: Full. Pupils: equal, round, reactive to light  III,IV,VI: Extra Ocular Movements are intact.  No nystagmus  V: Facial sensation is intact   VII: Facial strength and movements: intact and symmetric  VIII: Hearing: Intact to finger rub bilaterally  IX: Palate elevation is symmetric  XI: Shoulder shrug is intact  XII: Tongue movements are normal  Musculoskeletal: 5/5 in all 4 extremities. Tone: Normal tone. Reflexes: Bilateral biceps 2/4, triceps 2/4, brachial radialis 2/4, knee 2/4 and ankle 2/4. Planters: flexor bilaterally. Coordination: no pronator drift, no dysmetria with FNF in upper extremities. Normal REM. Sensation: normal to all modalities in both arms and legs. Gait/Posture: steady gait and normal posturing and station. Data:  LABS:   Lab Results   Component Value Date     06/08/2020    K 4.2 06/08/2020     06/08/2020    CO2 24 06/08/2020    BUN 20 06/08/2020    CREATININE 0.7 06/08/2020    GFRAA >60 06/08/2020    LABGLOM >60 06/08/2020    GLUCOSE 163 06/08/2020    MG 2.00 04/30/2018    CALCIUM 8.6 06/08/2020     Lab Results   Component Value Date    WBC 4.7 06/09/2020    RBC 4.61 06/09/2020    HGB 13.7 06/09/2020    HCT 41.2 06/09/2020    MCV 89.4 06/09/2020    RDW 13.6 06/09/2020     06/09/2020     Lab Results   Component Value Date    INR 1.23 (H) 06/08/2020    PROTIME 14.3 (H) 06/08/2020       Neuroimaging were independently reviewed by myself and discussed results with the patient  Reviewed notes from different physicians  Reviewed lab and blood testing    Impression:  New onset paresthesia and left-sided weakness. Likely TIA versus stroke. Could be related to hypertensive urgency since his blood pressure was at least 190 on admission. Hypertension, not controlled  Diabetes, controlled  Hyperlipidemia  Paroxysmal A. fib  Chronic anticoagulation  Right MCA stenosis, no need for further intervention at this point. Maximize stroke prevention and lifestyle adjustment. Recommendation:  Continue Eliquis  Patient is on aspirin 81 mg daily. Discussed risk of bleeding with dual therapy  Statin  Telemetry  MRI of the brain  PT and OT  Continue blood pressure monitor and control.   Goal inpatient should

## 2020-06-09 NOTE — PROGRESS NOTES
Regular Pattern; RR 8-20 = 0    Breath Sounds: Clear = 0    Sputum   ,  ,    Cough: Strong, spontaneous, non-productive = 0    Vital Signs   BP (!) 187/92   Pulse 83   Temp 98 °F (36.7 °C) (Oral)   Resp 16   Ht 6' 4\" (1.93 m)   Wt 259 lb (117.5 kg)   SpO2 96%   BMI 31.53 kg/m²   SPO2 (COPD values may differ): Greater than or equal to 92% on room air = 0    Peak Flow (asthma only): not applicable = 0    RSI: 0-4 = See once and convert to home regimen or discontinue        Plan       Goals: medication delivery, mobilize retained secretions, volume expansion and improve oxygenation    Patient/caregiver was educated on the proper method of use for Respiratory Care Devices:  Yes      Level of patient/caregiver understanding able to:   ? Verbalize understanding   ? Demonstrate understanding       ? Teach back        ? Needs reinforcement       ? No available caregiver               ? Other:     Response to education:  Excellent     Is patient being placed on Home Treatment Regimen? Yes     Does the patient have everything they need prior to discharge? Yes     Comments: pt assessed and chart reviewed    Plan of Care: HOME REG    Electronically signed by Tracy Lucas RCP on 6/8/2020 at 11:30 PM    Respiratory Protocol Guidelines     1. Assessment and treatment by Respiratory Therapy will be initiated for medication and therapeutic interventions upon initiation of aerosolized medication. 2. Physician will be contacted for respiratory rate (RR) greater than 35 breaths per minute. Therapy will be held for heart rate (HR) greater than 140 beats per minute, pending direction from physician. 3. Bronchodilators will be administered via Metered Dose Inhaler (MDI) with spacer when the following criteria are met:  a. Alert and cooperative     b. HR < 140 bpm  c. RR < 30 bpm                d. Can demonstrate a 2-3 second inspiratory hold  4.  Bronchodilators will be administered via Hand Held Nebulizer MOISES [facility] to patients when ANY of the following criteria are met  a. Incognizant or uncooperative          b. Patients treated with HHN at Home        c. Unable to demonstrate proper use of MDI with spacer     d. RR > 30 bpm   5. Bronchodilators will be delivered via Metered Dose Inhaler (MDI), HHN, Aerogen to intubated patients on mechanical ventilation. 6. Inhalation medication orders will be delivered and/or substituted as outlined below. Aerosolized Medications Ordering and Administration Guidelines:    1. All Medications will be ordered by a physician, and their frequency and/or modality will be adjusted as defined by the patients Respiratory Severity Index (RSI) score. 2. If the patient does not have documented COPD, consider discontinuing anticholinergics when RSI is less than 9.  3. If the bronchospasm worsens (increased RSI), then the bronchodilator frequency can be increased to a maximum of every 4 hours. If greater than every 4 hours is required, the physician will be contacted. 4. If the bronchospasm improves, the frequency of the bronchodilator can be decreased, based on the patient's RSI, but not less than home treatment regimen frequency. 5. Bronchodilator(s) will be discontinued if patient has a RSI less than 9 and has received no scheduled or as needed treatment for 72  Hrs. Patients Ordered on a Mucolytic Agent:    1. Must always be administered with a bronchodilator. 2. Discontinue if patient experiences worsened bronchospasm, or secretions have lessened to the point that the patient is able to clear them with a cough. Anti-inflammatory and Combination Medications:    1. If the patient lacks prior history of lung disease, is not using inhaled anti-inflammatory medication at home, and lacks wheezing by examination or by history for at least 24 hours, contact physician for possible discontinuation.

## 2020-06-09 NOTE — PROGRESS NOTES
Sensation  Overall Sensation Status: (Lt touch localization intact LUE)          LUE AROM : WFL  LUE General AROM: increased pain c/o at end ranges of shoulder flexion, external/internal rotation    RUE AROM : WFL  RUE General AROM: increased pain c/o at end ranges of shoulder flexion, external/internal rotation    Gross LUE Strength: WFL(3/5 throughout)  Gross RUE Strength: WNL     Plan   Plan  Times per week: 2-3 visits   Current Treatment Recommendations: Strengthening             AM-PAC Score        AM-[facility] Inpatient Daily Activity Raw Score: 24 (06/09/20 0922)  AM-PAC Inpatient ADL T-Scale Score : 57.54 (06/09/20 0922)  ADL Inpatient CMS 0-100% Score: 0 (06/09/20 0922)  ADL Inpatient CMS G-Code Modifier : [facility] (06/09/20 2041)    Goals  Short term goals  Time Frame for Short term goals: 3 days (6-12-20)  Short term goal 1: independent with LUE strengthening Home Exercise Program (HEP) by 6-12-20  Patient Goals   Patient goals : get stronger       Therapy Time   Individual Concurrent Group Co-treatment   Time In [address] [place] [address]         Time Out 0840         Minutes [address]

## 2020-06-09 NOTE — FLOWSHEET NOTE
06/09/20 1349   Encounter Summary   Services provided to: Patient not available   Referral/Consult From: [facility] Unknown   Continue Visiting   (6/9/Phone support. No answer.)

## 2020-06-10 ENCOUNTER — TELEPHONE (OUTPATIENT)
Dept: FAMILY MEDICINE CLINIC | Age: 60
End: 2020-06-10

## 2020-06-10 VITALS
SYSTOLIC BLOOD PRESSURE: 151 MMHG | DIASTOLIC BLOOD PRESSURE: 78 MMHG | HEART RATE: 85 BPM | RESPIRATION RATE: 18 BRPM | TEMPERATURE: 97.8 F | OXYGEN SATURATION: 93 % | WEIGHT: 259 LBS | HEIGHT: 76 IN | BODY MASS INDEX: 31.54 KG/M2

## 2020-06-10 LAB
GLUCOSE BLD-MCNC: 125 MG/DL (ref 70–99)
GLUCOSE BLD-MCNC: 131 MG/DL (ref 70–99)
PERFORMED ON: ABNORMAL
PERFORMED ON: ABNORMAL

## 2020-06-10 PROCEDURE — 6370000000 HC RX 637 (ALT 250 FOR IP): Performed by: PEDIATRICS

## 2020-06-10 PROCEDURE — 99225 PR SBSQ OBSERVATION CARE/DAY 25 MINUTES: CPT | Performed by: PSYCHIATRY & NEUROLOGY

## 2020-06-10 PROCEDURE — G0378 HOSPITAL OBSERVATION PER HR: HCPCS

## 2020-06-10 PROCEDURE — 94640 AIRWAY INHALATION TREATMENT: CPT

## 2020-06-10 RX ADMIN — LISINOPRIL 10 MG: 10 TABLET ORAL at 09:00

## 2020-06-10 RX ADMIN — METOPROLOL SUCCINATE 50 MG: 50 TABLET, EXTENDED RELEASE ORAL at 09:00

## 2020-06-10 RX ADMIN — APIXABAN 5 MG: 5 TABLET, FILM COATED ORAL at 09:00

## 2020-06-10 RX ADMIN — ATORVASTATIN CALCIUM 40 MG: 40 TABLET, FILM COATED ORAL at 09:00

## 2020-06-10 RX ADMIN — ASPIRIN 81 MG: 81 TABLET, COATED ORAL at 09:00

## 2020-06-10 RX ADMIN — Medication 2 PUFF: at 07:29

## 2020-06-10 NOTE — PROGRESS NOTES
Jamie Park  Neurology Follow-up  [facility] Neurology    Date of Service: 6/10/2020    Subjective:   CC: Follow up today regarding: Acute paresthesia and possible stroke. Events noted. Chart and lab reviewed. The patient denies any new symptoms today. MRI of the brain showed chronic small vessel disease. No acute stroke. Occasional intermittent paresthesia in his hands which is his chronic symptomatology. No motor weakness, chest pain, dysphagia or dysarthria, headache, double vision or blurred vision. No other new symptoms today. Other ROS was negative. ROS : A 10-12 system review obtained and updated today and is unremarkable except as mentioned  in my interval history. family history includes Asthma in his brother; Cancer in his father; Diabetes in his maternal grandmother; Heart Disease in his maternal uncle and mother.     Past Medical History:   Diagnosis Date    A-fib [facility])     Allergy to environmental factors     Asthma     HTN (hypertension) 8/16/2016    Mixed hyperlipidemia 8/16/2016     Current Facility-Administered Medications   Medication Dose Route Frequency Provider Last Rate Last Dose    glucose (GLUTOSE) 40 % oral gel 15 g  15 g Oral PRN Lainey Shaver MD        dextrose 50 % IV solution  12.5 g Intravenous PRN Jairon Mcghee MD        glucagon (rDNA) injection 1 mg  1 mg Intramuscular PRN Jairon Mcghee MD        dextrose 5 % solution  100 mL/hr Intravenous PRN Jairon Mcghee MD        insulin glargine (LANTUS) injection vial 5 Units  5 Units Subcutaneous Nightly Lainey Shaver MD   5 Units at 06/09/20 2048    insulin lispro (HUMALOG) injection vial 0-12 Units  0-12 Units Subcutaneous TID  Jairon Mcghee MD        insulin lispro (HUMALOG) injection vial 0-6 Units  0-6 Units Subcutaneous Nightly Jairon Mcghee MD        albuterol (PROVENTIL) nebulizer solution 2.5 mg  2.5 mg Nebulization Q4H PRN Jairon Mcghee MD        apixaban (ELIQUIS) tablet 5 mg  5 mg lisinopril (PRINIVIL;ZESTRIL) 10 MG tablet TAKE ONE TABLET BY MOUTH DAILY 90 tablet 1    atorvastatin (LIPITOR) 40 MG tablet TAKE 1 TABLET BY MOUTH DAILY AT BEDTIME 90 tablet 1    aspirin 81 MG tablet Take 81 mg by mouth daily      albuterol sulfate (PROAIR RESPICLICK) 810 (90 Base) MCG/ACT aerosol powder inhalation INHALE 2 PUFFS EVERY 6 HOURS AS NEEDED FOR WHEEZING/SHORTNESS OF BREATH 1 Inhaler 11    vitamin D (CHOLECALCIFEROL) 1000 UNIT TABS tablet Take 2,000 Units by mouth 2 times daily       glucose blood VI test strips (ASCENSIA AUTODISC VI;ONE TOUCH ULTRA TEST VI) strip Test blood sugars twice daily 100 each 99    [employer] LANCETS ORIGINAL MISC Check twice daily. Lancets approved by [employer] and patient choice 100 each 3     Allergies   Allergen Reactions    Penicillins Shortness Of Breath    Codeine Hives      reports that he quit smoking about 4 years ago. His smoking use included cigars and cigarettes. He has a 15.00 pack-year smoking history. He has never used smokeless tobacco. He reports current alcohol use. He reports that he does not use drugs. Objective:  Exam:   Constitutional:   Vitals:    06/09/20 2229 06/10/20 0407 06/10/20 0730 06/10/20 0750   BP: 138/80 139/77  (!) 151/78   Pulse: 67 73  85   Resp: 16 16 16 18   Temp: 98 °F (36.7 °C) 98 °F (36.7 °C)  97.8 °F (36.6 °C)   TempSrc: Oral Oral  Oral   SpO2: 97% 95% 93% 93%   Weight:       Height:         General appearance:  Normal development and appear in no acute distress. Eye: No icterus. Neck: supple  Cardiovascular:  No lower leg edema with good pulsation. Mental Status:   Oriented to person, place, problem, and time. Memory: Good immediate recall. Intact remote memory  Normal attention span and concentration. Language: intact naming, repeating and fluency   Good fund of Knowledge. Cranial Nerves:   II: Visual fields: Full. Pupils: equal, round, reactive to light  III,IV,VI: Extra Ocular Movements are intact. discharged from neurology when medically stable  No further recommendation. Fausto Rodriguez MD   [phone]      This dictation was generated by voice recognition computer software. Although all attempts are made to edit the dictation for accuracy, there may be errors in the transcription that are not intended.

## 2020-06-10 NOTE — PLAN OF CARE
Problem: Falls - Risk of:  Goal: Will remain free from falls  Description: Will remain free from falls  Outcome: Ongoing  Note: Pt encouraged to call out when in need. Call light and bedside table within reach . Bed rails up 3/4 wheels locked with bed in lowest position. Non skids on, pt in bed. Pt verbalizes understanding , will continue To monitor.

## 2020-06-10 NOTE — DISCHARGE SUMMARY
Hospital Medicine Discharge Summary    Patient ID: Jone West      Patient's PCP: Maris Hunt DO    Admit Date: 6/8/2020     Discharge Date: 6/10/2020      Admitting Physician: Brionna De Jesus MD     Discharge Physician: Flora Bertrand MD     Discharge Diagnoses: Active Hospital Problems    Diagnosis    TIA involving right internal carotid artery [G45.1]    DM type 2, controlled, with complication ([address] [W22.5]    Hypertensive urgency [I16.0]    HTN (hypertension), benign [I10]    Dyslipidemia [E78.5]    Acute ischemic multifocal anterior circulation stroke ([address] [I63.529]       The patient was seen and examined on day of discharge and this discharge summary is in conjunction with any daily progress note from day of discharge. Hospital Course:     61 yo M with history of DM2, HTN, presented with acute onset of LUE and RLE numbness and weakness. CTA head and neck done in the ER showed moderate to severe stnoesis of proximal M1 RMCA without acute occlusion. Admitted for further management     Acute onset L sided weakness- concerning for CVA on admission, MRI brain done negative for acute ischemic changes. Suspect may have been TIA, started on ASA, continued eliquis for afib stroke ppx. Continue statin. Encouraged ongoing improvements in his diabetes and HTN control. Neurology follow up as outpatient. L arm numbness - suspect due to radiculopathy with foraminal stenosis noted on MRI C spine. Provided with referral to ortho spine surgery.      HTN - resumed lisinopril and BB, follow up as outpatient for further titration      Dm2 - much better control over the last several months with improved A1c.  Continue with home medications      afib - rate controlled, continue home medications      COPD - no wheezing or s/s exacerbation or respiratory distress   - continue home inhalers           Physical Exam Performed:     BP (!) 151/78   Pulse 85   Temp 97.8 °F (36.6 °C) (Oral) Resp 18   Ht 6' 4\" (1.93 m)   Wt 259 lb (117.5 kg)   SpO2 93%   BMI 31.53 kg/m²       General appearance:  No apparent distress, appears stated age and cooperative. HEENT:  Normal cephalic, atraumatic without obvious deformity. Pupils equal, round, and reactive to light. Extra ocular muscles intact. Conjunctivae/corneas clear. Neck: Supple, with full range of motion. No jugular venous distention. Trachea midline. Respiratory:  Normal respiratory effort. Clear to auscultation, bilaterally without Rales/Wheezes/Rhonchi. Cardiovascular:  Regular rate and rhythm with normal S1/S2 without murmurs, rubs or gallops. Abdomen: Soft, non-tender, non-distended with normal bowel sounds. Musculoskeletal:  No clubbing, cyanosis or edema bilaterally. Full range of motion without deformity. Skin: Skin color, texture, turgor normal.  No rashes or lesions. Neurologic:  Mild sensory loss LUE, strength improved to 5/5 bilateral UE and LE   Psychiatric:  Alert and oriented, thought content appropriate, normal insight  Capillary Refill: Brisk,< 3 seconds   Peripheral Pulses: +2 palpable, equal bilaterally       Labs: For convenience and continuity at follow-up the following most recent labs are provided:      CBC:    Lab Results   Component Value Date    WBC 4.7 06/09/2020    HGB 13.7 06/09/2020    HCT 41.2 06/09/2020     06/09/2020       Renal:    Lab Results   Component Value Date     06/08/2020    K 4.2 06/08/2020     06/08/2020    CO2 24 06/08/2020    BUN 20 06/08/2020    CREATININE 0.7 06/08/2020    CALCIUM 8.6 06/08/2020         Significant Diagnostic Studies    Radiology:   MRI brain without contrast   Final Result   No acute intracranial abnormality. Specifically, no acute infarction. Unremarkable MRI of the brain without contrast.         MRI CERVICAL SPINE WO CONTRAST   Final Result   1. No acute cervical spine abnormality. 2. Severe left and mild right C3-C4 neural foraminal stenosis.

## 2020-06-11 ENCOUNTER — TELEPHONE (OUTPATIENT)
Dept: FAMILY MEDICINE CLINIC | Age: 60
End: 2020-06-11

## 2020-06-11 NOTE — TELEPHONE ENCOUNTER
[facility] Transitions Initial Follow Up Call    Call within 2 business days of discharge: Yes     Patient: Sheila Leon Patient : 1960 MRN: [id number]        RARS: No data recorded     Spoke with: Patient. Left hand still feeling numb, difficult time sleeping last night from anxiety. He will contact neurology and orthopedic spine surgery for consults. Discharge department/facility: [facility]    Non-face-to-face services provided:  Scheduled appointment with PCP-6/15/2020 virtual appt.     Follow Up  Future Appointments   Date Time Provider Carmita Donahue   6/15/2020 10:00 AM DO GIA Segovia [facility]   2020 10:15 AM DO GIA Segovia [facility]   10/1/2020  8:00 AM MD Miki Shelton LPN

## 2020-06-16 ENCOUNTER — TELEPHONE (OUTPATIENT)
Dept: CARDIOLOGY CLINIC | Age: 60
End: 2020-06-16

## 2020-06-16 ENCOUNTER — OFFICE VISIT (OUTPATIENT)
Dept: FAMILY MEDICINE CLINIC | Age: 60
End: 2020-06-16
Payer: COMMERCIAL

## 2020-06-16 VITALS
OXYGEN SATURATION: 98 % | SYSTOLIC BLOOD PRESSURE: 132 MMHG | DIASTOLIC BLOOD PRESSURE: 78 MMHG | TEMPERATURE: 96.8 F | HEART RATE: 70 BPM

## 2020-06-16 PROCEDURE — 1111F DSCHRG MED/CURRENT MED MERGE: CPT | Performed by: FAMILY MEDICINE

## 2020-06-16 PROCEDURE — 99495 TRANSJ CARE MGMT MOD F2F 14D: CPT | Performed by: FAMILY MEDICINE

## 2020-06-16 NOTE — PROGRESS NOTES
Post-Discharge Transitional Care Management Services or Hospital Follow Up      Evelyn Chen   YOB: 1960    Date of Office Visit:  6/16/2020  Date of Hospital Admission: 6/8/20  Date of Hospital Discharge: 6/10/20  Risk of hospital readmission (high >=14%.  Medium >=10%) :No data recorded    Care management risk score Rising risk (score 2-5) and Complex Care (Scores >=6): 10     Non face to face  following discharge, date last encounter closed (first attempt may have been earlier): 6/11/2020  8:44 AM    Call initiated 2 business days of discharge: Yes    Patient Active Problem List   Diagnosis    Asthma    Asthma exacerbation    [facility]    Smoker    Acute respiratory failure ([facility]    Essential hypertension    Mixed hyperlipidemia    [facility]oxys[facility] tachycardia ([facility]    PAF (paroxysmal atrial fibrillation) ([facility]    Type 2 diabetes mellitus without complication, without long-term current use of insulin ([address]    Acute ischemic multifocal anterior circulation stroke ([facility]    TIA involving right internal carotid artery    DM type 2, controlled, with complication ([facility]    Hypertensive urgency    HTN (hypertension), benign    Dyslipidemia       Allergies   Allergen Reactions    Penicillins Shortness Of Breath    Codeine Hives       Medications listed as ordered at the time of discharge from hospital   Rafael Tobias \"Richard\"   Home Medication Instructions WILLY:    Printed on:06/16/20 3691   Medication Information                      albuterol sulfate (PROAIR RESPICLICK) 923 (90 Base) MCG/ACT aerosol powder inhalation  INHALE 2 PUFFS EVERY 6 HOURS AS NEEDED FOR WHEEZING/SHORTNESS OF BREATH             apixaban (ELIQUIS) 5 MG TABS tablet  Take 1 tablet by mouth 2 times daily             aspirin 81 MG tablet  Take 81 mg by mouth daily             atorvastatin (LIPITOR) 40 MG tablet  TAKE 1 TABLET BY MOUTH DAILY AT BEDTIME             [employer] LANCETS ORIGINAL MISC  Check twice daily.  Lancets approved by insurance company and patient choice             fluticasone (FLONASE) 50 MCG/ACT nasal spray  2 sprays by Each Nostril route daily             fluticasone-vilanterol (BREO ELLIPTA) 100-25 MCG/INH AEPB inhaler  Inhale 1 puff into the lungs daily             glipiZIDE (GLUCOTROL) 5 MG tablet  Take 1 tablet by mouth 2 times daily             glucose blood VI test strips (ASCENSIA AUTODISC VI;ONE TOUCH ULTRA TEST VI) strip  Test blood sugars twice daily             lisinopril (PRINIVIL;ZESTRIL) 10 MG tablet  TAKE ONE TABLET BY MOUTH DAILY             metFORMIN (GLUCOPHAGE) 500 MG tablet  Take 2 tablets by mouth 2 times daily (with meals)             metoprolol succinate (TOPROL XL) 50 MG extended release tablet  Take 100mg in the morning and 50 mg at night             vitamin D (CHOLECALCIFEROL) 1000 UNIT TABS tablet  Take 2,000 Units by mouth 2 times daily                    Medications marked \"taking\" at this time  Outpatient Medications Marked as Taking for the 6/16/20 encounter (Office Visit) with Renzo Scott, DO   Medication Sig Dispense Refill    fluticasone-vilanterol (BREO ELLIPTA) 100-25 MCG/INH AEPB inhaler Inhale 1 puff into the lungs daily 30 each 5    glipiZIDE (GLUCOTROL) 5 MG tablet Take 1 tablet by mouth 2 times daily 180 tablet 1    metoprolol succinate (TOPROL XL) 50 MG extended release tablet Take 100mg in the morning and 50 mg at night 270 tablet 3    fluticasone (FLONASE) 50 MCG/ACT nasal spray 2 sprays by Each Nostril route daily (Patient taking differently: 2 sprays by Each Nostril route daily as needed ) 3 Bottle 1    apixaban (ELIQUIS) 5 MG TABS tablet Take 1 tablet by mouth 2 times daily 180 tablet 3    metFORMIN (GLUCOPHAGE) 500 MG tablet Take 2 tablets by mouth 2 times daily (with meals) 120 tablet 5    lisinopril (PRINIVIL;ZESTRIL) 10 MG tablet TAKE ONE TABLET BY MOUTH DAILY 90 tablet 1    atorvastatin (LIPITOR) 40 MG tablet TAKE 1 TABLET BY MOUTH DAILY AT BEDTIME 90 tablet 1    aspirin 81 MG tablet Take 81 mg by mouth daily      albuterol sulfate (PROAIR RESPICLICK) 762 (90 Base) MCG/ACT aerosol powder inhalation INHALE 2 PUFFS EVERY 6 HOURS AS NEEDED FOR WHEEZING/SHORTNESS OF BREATH 1 Inhaler 11    vitamin D (CHOLECALCIFEROL) 1000 UNIT TABS tablet Take 2,000 Units by mouth 2 times daily       glucose blood VI test strips (ASCENSIA AUTODISC VI;ONE TOUCH ULTRA TEST VI) strip Test blood sugars twice daily 100 each 99    [employer] LANCETS ORIGINAL MISC Check twice daily. Lancets approved by [employer] and patient choice 100 each 3        Medications patient taking as of now reconciled against medications ordered at time of hospital discharge: Yes    Chief Complaint   Patient presents with   [address] from Hospital    Fatigue       History of Present illness - Follow up of Hospital diagnosis(es):     61 yo M with history of DM2, HTN, presented with acute onset of LUE and LLE numbness and weakness. CTA head and neck done in the ER showed moderate to severe stenosis of proximal M1 RMCA without acute occlusion. Admitted for further management      Acute onset L sided weakness- concerning for CVA on admission, MRI brain done negative for acute ischemic changes. Suspect may have been TIA, started on ASA, continued eliquis for afib stroke ppx. Continue statin. Encouraged ongoing improvements in his diabetes and HTN control. Neurology follow up as outpatient.      L arm numbness - suspect due to radiculopathy with foraminal stenosis noted on MRI C spine. Provided with referral to ortho spine surgery.      HTN - resumed lisinopril and BB, follow up as outpatient for further titration      Dm2 - much better control over the last several months with improved A1c.  Continue with home medications      afib - rate controlled, continue home medications      COPD - no wheezing or s/s exacerbation or respiratory distress   - continue home

## 2020-06-16 NOTE — LETTER
[address]  [facility] [address]  Phone: [phone]  Fax: [phone]    Marlo Mackey DO        June 16, 2020     Patient: Prisca Guillaume   YOB: 1960   Date of Visit: 6/16/2020       To Whom It May Concern: It is my medical opinion that Prisca Guillaume should remain out of work until 6/17/20. If you have any questions or concerns, please don't hesitate to call.     Sincerely,        Marlo Mackey DO

## 2020-07-06 ENCOUNTER — TELEPHONE (OUTPATIENT)
Dept: FAMILY MEDICINE CLINIC | Age: 60
End: 2020-07-06

## 2020-07-06 NOTE — PROGRESS NOTES
[address]   Electrophysiology Follow Up Note              Date:  July 7, 2020  Patient name: Jeffrey Carlson  YOB: 1960    Reason for Follow Up:  Palpitations and afib    Primary Care Physician:Marlyn Stark DO    HISTORY OF PRESENT ILLNESS: Jeffrey Carlson is a 56 y.o. male who presents for evaluation for paroxysmal atrial fibrillation. He has a PMH of DM, PAT, HLD, asthma and PAF. He recently moved back from [place]. He recently wore an event monitor and that showed episodes of afib. Dr. Abida Presley increased metoprolol was increased on 2/20/2020 to 75 mg daily due to increased heart rates. He wore a 7 day cardiac event monitor from 3/10-3/17/2020 which demonstrated predominantly NSR with 1 episode of Atrial Tachycardia and no AF or AFL. His EKG on 3/31/2020 demonstrated NSR 90 bpm. He reported he vapes. His metoprolol was increased to 100mg in the AM and 50mg nightly on 3/31/20 for palpitation control. He was evaluated in the Emergency Department on 6/8/2020 for TIA. His CTA Head/Neck on 6/8/2020 demonstrated mod/severe stenosis of proximal M1 segment of right MCA. He was seen by Dr Irma Evans, neurology. He was d/c home in good condition and instructed to continue his home medications. His EKG today 7/7/20 demonstrates NSR hr of 86BPM. Today he reports he is still confused from his TIA on 6/8/20. He reports he is scheduled to see a neurologist on 7/16/20. He reports he did not get along with the neurologist in the hospital very well due to being very blunt. He reports he has a strong family history of strokes. He reports he has been fatigued and has had increased stress after his TIA. He reports he is taking his medications as prescribed. He reports he has been staying active. He denies chest pain, sob, palpitations, dizziness or syncope. Interval history since last office visit: He went to ED on 3/12/20 for tachycardia, HTN, and dizziness.  He was discharged with no medication changed. His JENNIFER from 3/30/20 noted NSR, PAC's <1 %, PVC's <1%, 1 episode of AT and no afib or aflutter. Today he states he feels better with the increase of metoprolol. He is still having fast heart rate but less frequently. He states that he not getting much exercise. He sits in front of TV or in front of computer mostly everyday. He is taking his medications as prescribed. Patient denies chest pain, sob, dizziness or syncope. EKG today shows SR 90 bpm.    Past Medical History:   has a past medical history of A-fib ([facility] [address] Allergy to environmental factors, Asthma, HTN (hypertension), and Mixed hyperlipidemia. Past Surgical History:   has a past surgical history that includes External ear surgery; Foot surgery; Tonsillectomy; and Adenoidectomy. Allergies:  Penicillins and Codeine    Social History:   reports that he quit smoking about 4 years ago. His smoking use included cigars and cigarettes. He has a 15.00 pack-year smoking history. He has never used smokeless tobacco. He reports current alcohol use. He reports that he does not use drugs. Family History: family history includes Asthma in his brother; Cancer in his father; Diabetes in his maternal grandmother; Heart Disease in his maternal uncle and mother. Home Medications:    Prior to Admission medications    Medication Sig Start Date End Date Taking?  Authorizing Provider   fluticasone-vilanterol (BREO ELLIPTA) 100-25 MCG/INH AEPB inhaler Inhale 1 puff into the lungs daily 6/8/20  Yes Marlyn Stark, DO   glipiZIDE (GLUCOTROL) 5 MG tablet Take 1 tablet by mouth 2 times daily 6/8/20  Yes Marlyn Stark, DO   metoprolol succinate (TOPROL XL) 50 MG extended release tablet Take 100mg in the morning and 50 mg at night 3/31/20  Yes ASPEN Carreno MD   fluticasone Harshad Drone) 50 MCG/ACT nasal spray 2 sprays by Each Nostril route daily  Patient taking differently: 2 sprays by Each Nostril route daily as needed  3/4/20  Yes Yash Cloud Salvatore,    apixaban (ELIQUIS) 5 MG TABS tablet Take 1 tablet by mouth 2 times daily 2/7/20  Yes Frances Gipson DO   metFORMIN (GLUCOPHAGE) 500 MG tablet Take 2 tablets by mouth 2 times daily (with meals) 2/7/20  Yes Marlyn Stark DO   lisinopril (PRINIVIL;ZESTRIL) 10 MG tablet TAKE ONE TABLET BY MOUTH DAILY 2/7/20  Yes Marlyn Stark DO   atorvastatin (LIPITOR) 40 MG tablet TAKE 1 TABLET BY MOUTH DAILY AT BEDTIME 2/7/20  Yes Marlyn Stark DO   aspirin 81 MG tablet Take 81 mg by mouth daily   Yes Historical Provider, MD   albuterol sulfate (PROAIR RESPICLICK) 084 (90 Base) MCG/ACT aerosol powder inhalation INHALE 2 PUFFS EVERY 6 HOURS AS NEEDED FOR WHEEZING/SHORTNESS OF BREATH 12/2/19  Yes Marlyn Stark DO   vitamin D (CHOLECALCIFEROL) 1000 UNIT TABS tablet Take 2,000 Units by mouth 2 times daily    Yes Historical Provider, MD   glucose blood VI test strips (ASCENSIA AUTODISC VI;ONE TOUCH ULTRA TEST VI) strip Test blood sugars twice daily 2/26/16  Yes Marlyn Stark DO   [employer] LANCETS ORIGINAL MISC Check twice daily. Lancets approved by insurance company and patient choice 1/22/16  Yes Frances Gipson DO       REVIEW OF SYSTEMS:    All 14-point review of systems are completed and  pertinent positives are mentioned in the history of present illness. Other  systems are reviewed and are negative. Physical Examination:    BP (!) 144/72   Pulse 88   Ht 6' 4\" (1.93 m)   Wt 270 lb (122.5 kg)   SpO2 98%   BMI 32.87 kg/m²      Constitutional and General Appearance:    alert, cooperative, no distress and appears stated age  [de-identified]:    PERRLA, no cervical lymphadenopathy. No masses palpable. Normal oral  mucosa  Respiratory:  · Normal excursion and expansion without use of accessory muscles  · Resp Auscultation: Normal breath sounds without dullness or wheezing  Cardiovascular:  · The apical impulse is not displaced  · Heart tones are crisp and normal. regular S1 and S2.   Abdomen:  · No masses or tenderness  · Bowel sounds present  Extremities:  ·  No Cyanosis or Clubbing  ·  Lower extremity edema: No  · Skin: Warm and dry  Neurological:  · Alert and oriented. · Moves all extremities well  · No abnormalities of mood, affect, memory, mentation, or behavior are noted    DATA:    EC20: NSR 86BPM  ECG:  3/31/20 SR 90 bpm  ECG: 3/10/2010  SR 91 bpm  ECHO: 2017  Summary   Normal systolic function with an estimated ejection fraction of 60%. Mild concentric left ventricular hypertrophy. No regional wall motion abnormalities are seen. Grade I diastolic dysfunction with normal filing pressure. [id number] Score for Atrial Fibrillation Stroke Risk   Risk   Factors  Component Value   C CHF No 0   H HTN Yes 1   A2 Age >= 76 No,  (62 y.o.) 0   D DM Yes 1   S2 Prior Stroke/TIA Yes 2   V Vascular Disease No 0   A Age 74-69 No,  (62 y.o.) 0   Sc Sex male 0    [id number]  Score  4   Score last updated 3/9/20 56:29 AM EDT    Click here for a link to the UpToDate guideline \"Atrial Fibrillation: Anticoagulation therapy to prevent embolization    Disclaimer: Risk Score calculation is dependent on accuracy of patient problem list and past encounter diagnosis. IMPRESSION:    1. Paroxysmal atrial qqntjngupaly-[id number] Score 2 (DM and HTN)  03/10/2020  Patient is a 59-year-old male with a medical history significant for diabetes, asthma, hypertension, and paroxysmal atrial fibrillation who presents with recurrent episodes of palpitations and tachycardia with heart rates in the 120s to 130s. Patient was diagnosed with atrial fibrillation based on a monitor back in  that showed a low burden of atrial fibrillation approximately 0.5%. Since then he has been on anticoagulation and rate control. He is compliant with all his medications. He denies history of stroke. He was referred to me because of a resting heart rate with mild activity in the 120s to 130s mistyped being on a beta-blocker.   At this point I am unclear what his tachycardia is from. He does have a history of atrial tachycardia before in the past.  In order to see what rhythm he is in during these episodes I would like to offer him a one-week monitor since episodes occur daily. Patient will return monitor and we will follow-up with him as far as the results of the monitor. Ending on the results we will determine whether or not to pursue more aggressive therapy. 03/31/2020  Patient presents for follow-up. He was recently seen in the emergency room with tachycardia/palpitations. He was discharged for follow-up. His monitor shows atrial tachycardia without any atrial fibrillation. We discussed increasing his rate control agents. Patient would like to try this before any other intervention. He will follow-up with me in 3 to 6 months. 07/07/2020  Unfortunately patient suffered a suspected TIA on 06/08/2020. He was evaluated by neurology ( along with neurologist). His scan of his head showed severe stenosis without acute thrombus. Patient continues on apixaban however has a strong family history of strokes. Discussed switching him from apixaban to Xarelto. We also discussed warfarin however patient understood and warfarin at this time. As he had a TIA patient is initiated evaluation for atrial fibrillation as it is unclear from his previous monitor whether or not his atrial fibrillation has  - Plan for ILR based on CRYSTAL AF trial  - Switch from apixaban to xarelto. - Will consider hematology consult for hypercoagulable state. RECOMMENDATIONS:  1. Discussed at length an Implanted Loop Recorder to monitor for irregular heart rhythms. Discussed risks and benefits. He is ready to proceed with the ILR. 2. Discussed need for blood thinners/anticoagulation due to increased risk for stroke. Discussed risks and benefits of medication. Already taking Eliquis and Aspirin.  Discussed changing blood thinners due to TIA while on Eliquis. 3. STOP taking Eliquis. 4. START taking Xarelto 20mg daily. 5. Follow up with me in 2 months. QUALITY MEASURES  1. Tobacco Cessation Counseling: NA  2. Retake of BP if >140/90:   Yes  3. Documentation to PCP/referring for new patient:  Sent to PCP at close of office visit  4. CAD patient on anti-platelet: No  5. CAD patient on STATIN therapy:  No  6. Patient with CHF and aFib on anticoagulation:  Yes     All questions and concerns were addressed to the patient/family. Alternatives to my treatment were discussed. This note was scribed in the presence of Gwendolyn García MD by Niesha Moore. Kemal Mishra RN. Dr. Keri Kocher MD  Electrophysiology  [place] [id number]. [address] [place] [address]. [address]. [place] [id number]  Phone: [phone]  Fax: [phone]     NOTE: This report was transcribed using voice recognition software. Every effort was made to ensure accuracy, however, inadvertent computerized transcription errors may be present. The scribe's documentation has been prepared under my direction and personally reviewed by me in its entirety. I confirm that the note above accurately reflects all work, physical examination, the discussion of treatments and procedures, and medical decision making performed by me. Hernesto Mast MD personally performed the services described in this documentation as scribed by nurse in my presence, and is both accurate and complete.     Electronically signed by Rosalino Collado MD on 7/8/2020 at 10:37 PM

## 2020-07-07 ENCOUNTER — OFFICE VISIT (OUTPATIENT)
Dept: CARDIOLOGY CLINIC | Age: 60
End: 2020-07-07
Payer: COMMERCIAL

## 2020-07-07 VITALS
BODY MASS INDEX: 32.88 KG/M2 | OXYGEN SATURATION: 98 % | HEART RATE: 88 BPM | SYSTOLIC BLOOD PRESSURE: 144 MMHG | WEIGHT: 270 LBS | DIASTOLIC BLOOD PRESSURE: 72 MMHG | HEIGHT: 76 IN

## 2020-07-07 PROCEDURE — 99214 OFFICE O/P EST MOD 30 MIN: CPT | Performed by: INTERNAL MEDICINE

## 2020-07-07 PROCEDURE — 93000 ELECTROCARDIOGRAM COMPLETE: CPT | Performed by: INTERNAL MEDICINE

## 2020-07-07 NOTE — TELEPHONE ENCOUNTER
Pt aware. Explained how to use Advair, call us if this is not covered. Pt became tearful during our conversation. Having a difficult time coping post CVA. Having issues slurring speech and collecting thoughts. He is going on medical leave of absence- wanted to let Dr. Ayo Tom know and to watch out for paper work.     SANJU

## 2020-07-08 ENCOUNTER — TELEPHONE (OUTPATIENT)
Dept: CARDIOLOGY CLINIC | Age: 60
End: 2020-07-08

## 2020-07-08 NOTE — TELEPHONE ENCOUNTER
Patient was seen on 7/7/20 by Dr Martinez Westfall. An implanted loop recorder was ordered to monitor for afib. Please call to schedule patient. Pt aware of need for COVID testing. Medications were not discussed. Thank you.

## 2020-07-22 ENCOUNTER — TELEPHONE (OUTPATIENT)
Dept: FAMILY MEDICINE CLINIC | Age: 60
End: 2020-07-22

## 2020-07-23 ENCOUNTER — OFFICE VISIT (OUTPATIENT)
Dept: ORTHOPEDIC SURGERY | Age: 60
End: 2020-07-23
Payer: COMMERCIAL

## 2020-07-23 VITALS — BODY MASS INDEX: 32.88 KG/M2 | WEIGHT: 270 LBS | HEIGHT: 76 IN

## 2020-07-23 PROCEDURE — 99243 OFF/OP CNSLTJ NEW/EST LOW 30: CPT | Performed by: ORTHOPAEDIC SURGERY

## 2020-07-23 NOTE — PROGRESS NOTES
History of present illness:   Mr. Angelika Norwood is a pleasant 56 y.o. old male with a PMH of HTN, atrial fibrillation, and asthma kindly referred by Dr. Ching Luna for consultation regarding Mr. Bc Tobias's neck, and left arm pain. He states the pain began insidiously about 1.5 months ago. His pain has steadily persisted since then. At that time, he developed confusion, balance disruption, left arm and hand tingling, as well as left leg tingling and weakness. He presented to the ED and was noted to have a TIA. 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 He rates his neck pain 2/10 and shoulder and arm pain 3/10. He describes the pain as aching. The arm pain radiates to his left greater than right shoulder and upper arm. He notes numbness and tingling in his left hand and 3rd-5th digits. He notes weakness of his legs. He notes numbness and tingling in his left anterior thigh. He notes gait instability with occasional balance disruption since his TIA. He states these symptoms were not present prior to then. He denies saddle numbness or bowel or bladder dysfunction. The pain occasionally with his sleep. He is now on Eliquis. Past medical history:   His past medical history has been reviewed and is significant for HTN, asthma and atrial fibrillation. His past surgical history has been reviewed and is non-contributory to his present illness. His  medications and allergies were reviewed. His social history has been reviewed and he is a former smoker, 15 pack year history. His family history has been reviewed is significant for heart disease, cancer, asthma and diabetes. Review of symptoms:   His review of symptoms was reviewed and is significant for neck pain and negative for recent weight loss, fatigue, chills, night sweats, blood in stool or urine, recent infection, chest pain, visual disturbance, or shortness of breath.  All other ROS were negative. Physical examination:   Mr. Yoly Tobias's most recent vitals:  Vitals  Height: 6' 4\" (193 cm)  Weight: 270 lb (122.5 kg)  Body mass index is 32.87 kg/m². General Exam:  He is well-developed and well-nourished, is in obvious pain and alert and oriented to person, place, and time. He demonstrates appropriate mood and affect. He walks with a normal gait. He has mild difficulty with tandem walking. HEENT:   His cervical flexion, extension, and axial rotation are moderately reduced with pain. His skin is warm and dry. He has no tenderness over his cervical spine and no obvious muscle spasm. The skin over his cervical spine is normal without surgical scar. Upper extremities:  He has 5/5 strength of his interosseous muscles, wrist dorsiflexors and volarflexors, biceps, triceps, deltoids, and internal and external rotators of his shoulders, bilaterally. His biceps, triceps, bracheoradialis, quadriceps and achilles reflexes are 2+, bilaterally. Sensation is intact to light touchfrom C6 to C8. He has no clonus and negative Padilla's bilaterally. Negative Tinel sign bilateral wrists and elbows. Positive Phalen's test on the left hand. Lower extremities:  He has 5/5 motor strength of bilateral lower extremities. DTR at knees and achilles are normal. Sensation intact L3-S1 bilaterally. Imaging:   I reviewed MRI images of his cervical spine from 6/9/20. They show cervical spondylosis with a small disc bulge with severe left foraminal narrowing C3-4. He may have a facet cyst C3-4 compression the left cerebral artery. Assessment:   Cervical spondylosis with radiculopathy. Plan:   We discussed treatment options including observation, upper extremity EMG, physical therapy, and epidural injections. We discussed that some of his symptoms may be residual from his prior TIA.  I recommend he see Dr. Pankaj Santos for possible left vertebral artery angiogram. We will also check an EMG of his left upper extremity and call him with his EMG results.

## 2020-08-07 ENCOUNTER — OFFICE VISIT (OUTPATIENT)
Dept: VASCULAR SURGERY | Age: 60
End: 2020-08-07
Payer: COMMERCIAL

## 2020-08-07 VITALS
HEIGHT: 76 IN | WEIGHT: 272 LBS | BODY MASS INDEX: 33.12 KG/M2 | DIASTOLIC BLOOD PRESSURE: 80 MMHG | SYSTOLIC BLOOD PRESSURE: 140 MMHG

## 2020-08-07 PROCEDURE — 99243 OFF/OP CNSLTJ NEW/EST LOW 30: CPT | Performed by: SURGERY

## 2020-08-07 NOTE — PROGRESS NOTES
Outpatient Consultation / H&P    Date of Consultation:  8/7/2020    PCP:  Adrianna Garg DO     Referring Provider:  Dr. Gisella Gay     Chief Complaint:   Chief Complaint   Patient presents with    Other     patient was ref by Dr Gisella Gay for vertebral artery stenosis. pamlr        History of Present Illness: We are asked to see this patient in consultation by Dr. Gisella Gay regarding possible vertebral artery disease. Monica Hills is a 56 y.o. male who was recently admitted to hospital with left arm and leg pain. He was thought to have a TIA secondary to R intra cranial MCA stenosis. He was seen by Dr Gisella Gay regarding cervical disc disease and radiculopathy. He was concerned that there may be left vertebral artery compression from a facet cyst.    Patient denies any lightheadedness, dizziness, or ataxia. Past Medical History:  Past Medical History:   Diagnosis Date    A-fib ([facility] [address]     Allergy to environmental factors     Asthma     HTN (hypertension) 8/16/2016    Mixed hyperlipidemia 8/16/2016       Past Surgical History:  Past Surgical History:   Procedure Laterality Date    ADENOIDECTOMY      EXTERNAL EAR SURGERY      multiple ear surgeries    FOOT SURGERY      both    TONSILLECTOMY         Home Medications:   Prior to Admission medications    Medication Sig Start Date End Date Taking?  Authorizing Provider   rivaroxaban (XARELTO) 20 MG TABS tablet Take 1 tablet by mouth daily (with breakfast) 7/7/20  Yes Dre Joe MD   fluticasone-salmeterol (ADVAIR) 250-50 MCG/DOSE AEPB Inhale 1 puff into the lungs every 12 hours 7/7/20  Yes Marlyn Stark DO   fluticasone-vilanterol (BREO ELLIPTA) 100-25 MCG/INH AEPB inhaler Inhale 1 puff into the lungs daily 6/8/20  Yes Marlyn Stark DO   glipiZIDE (GLUCOTROL) 5 MG tablet Take 1 tablet by mouth 2 times daily 6/8/20  Yes Marlyn Stark DO   metoprolol succinate (TOPROL XL) 50 MG extended release tablet Take 100mg in the morning and 50 mg at night 3/31/20  Yes Jono Jules MD   fluticasone [facility]) 50 MCG/ACT nasal spray 2 sprays by Each Nostril route daily  Patient taking differently: 2 sprays by Each Nostril route daily as needed  3/4/20  Yes Ar Chase DO   metFORMIN (GLUCOPHAGE) 500 MG tablet Take 2 tablets by mouth 2 times daily (with meals) 2/7/20  Yes Marlyn Stark DO   lisinopril (PRINIVIL;ZESTRIL) 10 MG tablet TAKE ONE TABLET BY MOUTH DAILY 2/7/20  Yes Marlyn Stark DO   atorvastatin (LIPITOR) 40 MG tablet TAKE 1 TABLET BY MOUTH DAILY AT BEDTIME 2/7/20  Yes Marlyn Stark DO   aspirin 81 MG tablet Take 81 mg by mouth daily   Yes Historical Provider, MD   albuterol sulfate (PROAIR RESPICLICK) 508 (90 Base) MCG/ACT aerosol powder inhalation INHALE 2 PUFFS EVERY 6 HOURS AS NEEDED FOR WHEEZING/SHORTNESS OF BREATH 12/2/19  Yes Marlyn Stark DO   vitamin D (CHOLECALCIFEROL) 1000 UNIT TABS tablet Take 2,000 Units by mouth 2 times daily    Yes Historical Provider, MD   glucose blood VI test strips (ASCENSIA AUTODISC VI;ONE TOUCH ULTRA TEST VI) strip Test blood sugars twice daily 2/26/16  Yes Marlyn Stark DO   [employer] LANCETS ORIGINAL MISC Check twice daily.  Lancets approved by insurance company and patient choice 1/22/16  Yes Natty Barajas DO        Allergies:  Penicillins and Codeine      Social History:      Social History     Socioeconomic History    Marital status:      Spouse name: Not on file    Number of children: Not on file    Years of education: Not on file    Highest education level: Not on file   Occupational History    Not on file   Social Needs    Financial resource strain: Not on file    Food insecurity     Worry: Not on file     Inability: Not on file   [employer] needs     Medical: Not on file     Non-medical: Not on file   Tobacco Use    Smoking status: Former Smoker     Packs/day: 1.00     Years: 15.00     Pack years: 15.00     Types: Cigars, Cigarettes     Last attempt to quit: 2015     Years since quittin.6    Smokeless tobacco: Never Used   Substance and Sexual Activity    Alcohol use: Yes     Comment: 2-3 beers/month    Drug use: No    Sexual activity: Not on file   Lifestyle    Physical activity     Days per week: Not on file     Minutes per session: Not on file    Stress: Not on file   Relationships    Social connections     Talks on phone: Not on file     Gets together: Not on file     Attends Mandaen service: Not on file     Active member of club or organization: Not on file     Attends meetings of clubs or organizations: Not on file     Relationship status: Not on file    Intimate partner violence     Fear of current or ex partner: Not on file     Emotionally abused: Not on file     Physically abused: Not on file     Forced sexual activity: Not on file   Other Topics Concern    Not on file   Social History Narrative    Not on file       Family History:        Problem Relation Age of Onset    Heart Disease Mother     Cancer Father     Asthma Brother     Heart Disease Maternal Uncle     Diabetes Maternal Grandmother        Review of Systems:  A 14 point review of systems was completed. Pertinent positives identified in the HPI, all other review of systems negative. Physical Examination:    BP (!) 140/80 (Site: Left Upper Arm)   Ht 6' 4\" (1.93 m)   Wt 272 lb (123.4 kg)   BMI 33.11 kg/m²     Weight: 272 lb (123.4 kg)       General appearance: NAD  Eyes: PERRLA  Neck: no JVD, no lymphadenopathy. Respiratory: effort is unlabored, no crackles, wheezes or rubs. Cardiovascular: regular, no murmur. No carotid bruits. Pulses:    carotid radial   RIGHT 2 2   LEFT 2 2   GI: abdomen soft, nondistended, no organomegaly. Musculoskeletal: strength and tone normal.  Extremities: warm and pink. Skin: no dermatitis or ulceration. Neuro/psychiatric: grossly intact. MEDICAL DECISION MAKING/TESTING        CTA:  Images personally reviewed.     Bilateral Vertebral arteries are patent without stenosis. The basilar artery is patent. I see no evidence of extrinsic compression of the vertebral arteries- albeit this is not a dynamic study. Assessment:      Diagnosis Orders   1. PVD (peripheral vascular disease) (HCC)       No evidence of Vertebral stenosis of extrinsic compression on static CT images. Generally to be symptomatic from Vertebral artery disease there needs to be a stenosis or compression along with a contra lateral occlusion. Recommendations/Plan:    No further testing or intervention indicated.        Kelsie Montgomery MD, FACS

## 2020-08-11 ENCOUNTER — OFFICE VISIT (OUTPATIENT)
Dept: FAMILY MEDICINE CLINIC | Age: 60
End: 2020-08-11
Payer: COMMERCIAL

## 2020-08-11 VITALS
DIASTOLIC BLOOD PRESSURE: 88 MMHG | OXYGEN SATURATION: 99 % | TEMPERATURE: 96.5 F | BODY MASS INDEX: 33.36 KG/M2 | WEIGHT: 274 LBS | HEART RATE: 99 BPM | RESPIRATION RATE: 16 BRPM | HEIGHT: 76 IN | SYSTOLIC BLOOD PRESSURE: 148 MMHG

## 2020-08-11 PROCEDURE — 99214 OFFICE O/P EST MOD 30 MIN: CPT | Performed by: FAMILY MEDICINE

## 2020-08-11 NOTE — PROGRESS NOTES
Exam:    Physical Exam  Constitutional:       General: He is not in acute distress. Appearance: Normal appearance. He is obese. He is not ill-appearing. Neurological:      Mental Status: He is alert. Psychiatric:         Mood and Affect: Mood normal.         Behavior: Behavior normal.         Thought Content: Thought content normal.         Current Outpatient Medications   Medication Sig Dispense Refill    rivaroxaban (XARELTO) 20 MG TABS tablet Take 1 tablet by mouth daily (with breakfast) 90 tablet 3    fluticasone-salmeterol (ADVAIR) 250-50 MCG/DOSE AEPB Inhale 1 puff into the lungs every 12 hours 60 each 5    fluticasone-vilanterol (BREO ELLIPTA) 100-25 MCG/INH AEPB inhaler Inhale 1 puff into the lungs daily 30 each 5    glipiZIDE (GLUCOTROL) 5 MG tablet Take 1 tablet by mouth 2 times daily 180 tablet 1    metoprolol succinate (TOPROL XL) 50 MG extended release tablet Take 100mg in the morning and 50 mg at night 270 tablet 3    fluticasone (FLONASE) 50 MCG/ACT nasal spray 2 sprays by Each Nostril route daily (Patient taking differently: 2 sprays by Each Nostril route daily as needed ) 3 Bottle 1    metFORMIN (GLUCOPHAGE) 500 MG tablet Take 2 tablets by mouth 2 times daily (with meals) 120 tablet 5    lisinopril (PRINIVIL;ZESTRIL) 10 MG tablet TAKE ONE TABLET BY MOUTH DAILY 90 tablet 1    atorvastatin (LIPITOR) 40 MG tablet TAKE 1 TABLET BY MOUTH DAILY AT BEDTIME 90 tablet 1    aspirin 81 MG tablet Take 81 mg by mouth daily      albuterol sulfate (PROAIR RESPICLICK) 146 (90 Base) MCG/ACT aerosol powder inhalation INHALE 2 PUFFS EVERY 6 HOURS AS NEEDED FOR WHEEZING/SHORTNESS OF BREATH 1 Inhaler 11    vitamin D (CHOLECALCIFEROL) 1000 UNIT TABS tablet Take 2,000 Units by mouth 2 times daily       glucose blood VI test strips (ASCENSIA AUTODISC VI;ONE TOUCH ULTRA TEST VI) strip Test blood sugars twice daily 100 each 99    [facility] LANCETS ORIGINAL MISC Check twice daily.  Lancets approved by insurance company and patient choice 100 each 3     No current facility-administered medications for this visit. Assessment:    1. Type 2 diabetes mellitus without complication, without long-term current use of insulin ([address]    2. Essential hypertension    3. Mixed hyperlipidemia        Plan:    1. Type 2 diabetes mellitus without complication, without long-term current use of insulin ([facility])  Stable. Continue current medications. - metFORMIN (GLUCOPHAGE) 500 MG tablet; Take 2 tablets by mouth 2 times daily (with meals)  Dispense: 120 tablet; Refill: 5  - glipiZIDE (GLUCOTROL) 5 MG tablet; Take 1 tablet by mouth 2 times daily  Dispense: 60 tablet; Refill: 3  -  DIABETES FOOT EXAM    2. Essential hypertension  Did not take his medicines this morning.   - lisinopril (PRINIVIL;ZESTRIL) 10 MG tablet; TAKE ONE TABLET BY MOUTH DAILY  Dispense: 90 tablet; Refill: 1    3. Mixed hyperlipidemia  Stable. Continue current medications. - atorvastatin (LIPITOR) 40 MG tablet; TAKE 1 TABLET BY MOUTH DAILY AT BEDTIME  Dispense: 90 tablet; Refill: 1      Return in about 3 months (around 11/11/2020) for Preventative.

## 2020-08-12 ENCOUNTER — OFFICE VISIT (OUTPATIENT)
Dept: ORTHOPEDIC SURGERY | Age: 60
End: 2020-08-12
Payer: COMMERCIAL

## 2020-08-12 PROCEDURE — 95909 NRV CNDJ TST 5-6 STUDIES: CPT | Performed by: PHYSICAL MEDICINE & REHABILITATION

## 2020-08-12 PROCEDURE — 95886 MUSC TEST DONE W/N TEST COMP: CPT | Performed by: PHYSICAL MEDICINE & REHABILITATION

## 2020-08-12 NOTE — PROGRESS NOTES
[address] [facility], [address]   Ph: [phone]  NCS & Electromyography Report        Full Name: Kamran Abdullahi Gender: Male  Patient ID: [id number] YOB: 1960      Visit Date: 8/12/2020 15:28  Age: 56 Years 2 Months Old  Examining Physician: Dr Lelia Whitehead  Referring Physician: Dr Sheba Patton  Patient History: lt arm pain      Sensory NCS      Nerve / Sites Rec. Site Onset Lat Peak Lat PP Amp Segments Distance Peak Diff Velocity     ms ms µV  cm ms m/s   L Median - D2 Ulnar D5      Median Dig II 3.59 4.17 0.46 Median - Dig II 14  39      Ref. ?3.70 ? 20.0 Ref. ?53      Ulnar Dig V 3.28 4.17 4.7 Ulnar - Median 14 0.00 448      Ref. ?3.50 ? 10.0 Ref. ?53   L Median, Radial - Radial Thumb      Median Wrist Thumb    Median Wrist - Thumb 10        Radial Wrist Thumb 1.82 2.45 16.9 Radial Wrist - Thumb 10  55        Median Wrist - Radial Wrist           Ref. ?0.50        Motor NCS      Nerve / Sites Muscle Latency Amplitude Amp % Duration Segments Distance Lat Diff Velocity     ms mV % ms  cm ms m/s   L Median - APB      Wrist APB 3.96 15.3 100 7.03 Wrist - APB 8        Ref. ?4.40 ?4.0   Ref. Elbow APB 9.27 14.7 96.3 7.40 Elbow - Wrist 30 5.31 56      Ref. Ref.   ?49   L Ulnar - ADM      Wrist ADM 3.39 4.4 100 8.23 Wrist - ADM 8        Ref. ?3.60 ?5.0   Ref. B. Elbow ADM 8.02 3.7 85.3 8.75 B. Elbow - Wrist 25 4.64 54      Ref. Ref.   ?49      A. Elbow ADM 10.42 3.3 75.5 8.65 A. Elbow - B. Elbow 10 2.40 42      Ref. Ref.   ?52       F  Wave      Nerve Fmin    ms   L Ulnar - ADM 37.14   REF ?32.00       EMG Summary Table     Spontaneous MUAP Recruitment   Muscle Nerve Roots IA Fib PSW Fasc H.F. Amp Dur. PPP Pattern   L. First dorsal interosseous Ulnar C8-T1 1+ None 1+ None None 2+ 2+ 1+ N   L. Abductor pollicis brevis Median Q1-D5 N None None None None 2+ 1+ N N   L. Extensor digitorum communis Radial C7-C8 N None None None None N N N N   L.  Deltoid Axillary C5-C6 N None None None None N N N N   L. Triceps brachii Radial C6-C8 N None None None None N N N N   L. Biceps brachii Musculocutaneous C5-C6 N None None None None N N N N   L. Pronator teres Median C6-C7 N None None None None N N N N   L. Cervical paraspinals Spinal C4-C8 N None None None None N N N N   L. Flexor digitorum profundus, dig 4 & 5 Ulnar C8-T1 N None None None None N N N N         Summary: Left median SNAP and CMAP latencies are slowed with low sensory amplitudes. Left ulnar SNAP latency is slowed with low amplitude. Left ulnar CMAP amplitude is low with proximal drop in amplitude. Left ulnar F wave is slowed. Monopolar exam reveals large amplitude motor units to distal left median and ulnar muscles. The remainder of the NCS and monopolar exam are normal.        Conclusion:  Abnormal examination. There is electrodiagnostic evidence of:    1. Moderate chronic left carpal tunnel syndrome  2. 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 Diffuse abnormalities noted above can also be seen with a peripheral neuropathy affecting the left upper extremity. Clinical correlation is required.               Carey Willett MD    Board Certified Physical Medicine and Rehabilitation

## 2020-08-13 RX ORDER — LISINOPRIL 10 MG/1
TABLET ORAL
Qty: 90 TABLET | Refills: 1 | Status: SHIPPED | OUTPATIENT
Start: 2020-08-13 | End: 2021-01-05 | Stop reason: SDUPTHER

## 2020-08-13 RX ORDER — ATORVASTATIN CALCIUM 40 MG/1
TABLET, FILM COATED ORAL
Qty: 90 TABLET | Refills: 1 | Status: SHIPPED | OUTPATIENT
Start: 2020-08-13 | End: 2021-01-05 | Stop reason: SDUPTHER

## 2020-08-13 NOTE — TELEPHONE ENCOUNTER
Future appt scheduled 11/17/2020          Last appt 08/11/2020      Last Written     atorvastatin (LIPITOR) 40 MG tablet  02/07/2020  #90  1 RF       lisinopril (PRINIVIL;ZESTRIL) 10 MG tablet  02/07/2020  #90  1 RF

## 2020-08-17 NOTE — TELEPHONE ENCOUNTER
Last office visit 8/11/2020     Last written  02/07/2020, 120 tablets with 5 refills.      Next office visit scheduled 11/17/2020    Requested Prescriptions     Pending Prescriptions Disp Refills    metFORMIN (GLUCOPHAGE) 500 MG tablet 120 tablet 5     Sig: Take 2 tablets by mouth 2 times daily (with meals)

## 2020-09-29 NOTE — PROGRESS NOTES
[address]   Electrophysiology Follow Up Note              Date:  October 1, 2020  Patient name: Chrissy Esteban  YOB: 1960    Reason for Follow Up:  Hospital follow up: tachycardia and atrial fibrillation  Primary Care Physician:Marlyn Stark DO    HISTORY OF PRESENT ILLNESS: Chrissy Esteban is a 56 y.o. male who initially presented for evaluation for paroxysmal atrial fibrillation. He has a PMH of DM, PAT, HLD, asthma and PAF. He recently moved back from [place]. He recently wore an event monitor and that showed episodes of afib. Dr. Isabella Brandt increased metoprolol was increased on 2/20/2020 to 75 mg daily due to increased heart rates. He wore a 7 day cardiac event monitor from 3/10-3/17/2020 which demonstrated predominantly NSR with 1 episode of Atrial Tachycardia and no AF or AFL. His EKG on 3/31/2020 demonstrated NSR 90 bpm. He reported he vapes. His metoprolol was increased to 100mg in the AM and 50mg nightly on 3/31/20 for palpitation control. He was evaluated in the Emergency Department on 6/8/2020 for TIA. His CTA Head/Neck on 6/8/2020 demonstrated mod/severe stenosis of proximal M1 segment of right MCA. He was seen by Dr Charlie Devine, neurology. He was d/c home in good condition and instructed to continue his home medications. His EKG on 7/7/20 demonstrates NSR hr of 86BPM. He reported he was still confused from his TIA on 6/8/20. He reported he is scheduled to see a neurologist on 7/16/20. He reported he did not get along with the neurologist in the hospital very well due to being very blunt. He reported he has a strong family history of strokes. He went to ED on 3/12/20 for tachycardia, HTN, and dizziness. He was discharged with no medication changed. His JENNIFER from 3/30/20 noted NSR, PAC's <1 %, PVC's <1%, 1 episode of AT and no afib or aflutter. Interval history since last office visit:   Today he presents for hospital follow up.   He went to [facility] on 9/13/2020 for diarrhea, vomiting, fever, tachycardia and atrial fibrillation. Stress test was positive for ischemia. He underwent LHC on 9/16/2020 that demonstrated mild luminal irregularities and non obstructive CAD. False positive stress test.  Elevated LVEDP of 18 mmHg. Echo demonstrated an EF of 60-65%. He was started on Flecainide 50mg twice a day and his lisinopril was stopped. He states that the Flecainide makes him fatigued. He states that he never feels his heart racing. He did not feel it when he went to the ED. He feels flutters once in a while. He denies edema or increase in shortness of breath. He has asthma. He states that he has gained over 15 lb since the [place] pandemic. His sugar is not controlled at this time. EKG today shows SR 87 bpm.    Past Medical History:   has a past medical history of A-fib ([address] Allergy to environmental factors, Asthma, HTN (hypertension), and Mixed hyperlipidemia. Past Surgical History:   has a past surgical history that includes External ear surgery; Foot surgery; Tonsillectomy; and Adenoidectomy. Allergies:  Penicillins and Codeine    Social History:   reports that he quit smoking about 4 years ago. His smoking use included cigars and cigarettes. He has a 15.00 pack-year smoking history. He has never used smokeless tobacco. He reports current alcohol use. He reports that he does not use drugs. Family History: family history includes Asthma in his brother; Cancer in his father; Diabetes in his maternal grandmother; Heart Disease in his maternal uncle and mother. Uncle had strokes, younger sister had several strokes    Home Medications:    Prior to Admission medications    Medication Sig Start Date End Date Taking?  Authorizing Provider   flecainide (TAMBOCOR) 50 MG tablet Take 50 mg by mouth 2 times daily   Yes Historical Provider, MD   metFORMIN (GLUCOPHAGE) 500 MG tablet Take 2 tablets by mouth 2 times daily (with meals) 8/17/20  Yes Gibran Stout,  atorvastatin (LIPITOR) 40 MG tablet TAKE 1 TABLET BY MOUTH DAILY AT BEDTIME 8/13/20  Yes Marlyn Stark DO   rivaroxaban (XARELTO) 20 MG TABS tablet Take 1 tablet by mouth daily (with breakfast) 7/7/20  Yes Emerson Ceballos MD   fluticasone-salmeterol (ADVAIR) 250-50 MCG/DOSE AEPB Inhale 1 puff into the lungs every 12 hours 7/7/20  Yes Marlyn Stark DO   glipiZIDE (GLUCOTROL) 5 MG tablet Take 1 tablet by mouth 2 times daily 6/8/20  Yes Marlyn Stark DO   metoprolol succinate (TOPROL XL) 50 MG extended release tablet Take 100mg in the morning and 50 mg at night 3/31/20  Yes Emerson Ceballos MD   fluticasone [facility]) 50 MCG/ACT nasal spray 2 sprays by Each Nostril route daily  Patient taking differently: 2 sprays by Each Nostril route daily as needed  3/4/20  Yes Ar Chase,    aspirin 81 MG tablet Take 81 mg by mouth daily   Yes Historical Provider, MD   albuterol sulfate (PROAIR RESPICLICK) 498 (90 Base) MCG/ACT aerosol powder inhalation INHALE 2 PUFFS EVERY 6 HOURS AS NEEDED FOR WHEEZING/SHORTNESS OF BREATH 12/2/19  Yes Marlyn Stark DO   vitamin D (CHOLECALCIFEROL) 1000 UNIT TABS tablet Take 2,000 Units by mouth 2 times daily    Yes Historical Provider, MD   glucose blood VI test strips (ASCENSIA AUTODISC VI;ONE TOUCH ULTRA TEST VI) strip Test blood sugars twice daily 2/26/16  Yes Marlyn Stark DO   [facility] LANC[facility] ORIGINAL [facility] Check twice daily.  Lancets approved by insurance company and patient choice 1/22/16  Yes Selena Robles,    lisinopril (PRINIVIL;ZESTRIL) 10 MG tablet TAKE ONE TABLET BY MOUTH DAILY  Patient not taking: Reported on 10/1/2020 8/13/20   Sotirios Leonel Spurling,    fluticasone-vilanterol (BREO ELLIPTA) 100-25 MCG/INH AEPB inhaler Inhale 1 puff into the lungs daily  Patient not taking: Reported on 10/1/2020 6/8/20   Marlyn Stark DO       REVIEW OF SYSTEMS:    All 14-point review of systems are completed and  pertinent positives are mentioned in the history of present illness. Other  systems are reviewed and are negative. Physical Examination:    /76   Pulse 88   Ht 6' 4\" (1.93 m)   Wt 270 lb (122.5 kg)   SpO2 98%   BMI 32.87 kg/m²      Constitutional and General Appearance:    alert, cooperative, no distress and appears stated age  [de-identified]:    PERRLA, no cervical lymphadenopathy. No masses palpable. Normal oral  mucosa  Respiratory:  · Normal excursion and expansion without use of accessory muscles  · Resp Auscultation: Normal breath sounds without dullness or wheezing  Cardiovascular:  · The apical impulse is not displaced  · Heart tones are crisp and normal. regular S1 and S2. Abdomen:  · No masses or tenderness  · Bowel sounds present  Extremities:  ·  No Cyanosis or Clubbing  ·  Lower extremity edema: No  · Skin: Warm and dry  Neurological:  · Alert and oriented. · Moves all extremities well  · No abnormalities of mood, affect, memory, mentation, or behavior are noted    DATA:    EC20: NSR 86BPM  ECG:  10/1/2020 87 bpm  ECHO: 9/15/2020   Left ventricle: Not well visualized. Systolic function was normal. The estimated ejection fraction    was in the range of 60% to 65%. Images were inadequate for LV wall motion assessment. Left    ventricular diastolic function parameters were normal.  - Right ventricle: Systolic function was normal. TAPSE: 2.9cm. ECHO: 2017  Summary   Normal systolic function with an estimated ejection fraction of 60%. Mild concentric left ventricular hypertrophy. No regional wall motion abnormalities are seen. Grade I diastolic dysfunction with normal filing pressure.      [id number] Score for Atrial Fibrillation Stroke Risk   Risk   Factors  Component Value   C CHF No 0   H HTN Yes 1   A2 Age >= 75 No,  (62 y.o.) 0   D DM Yes 1   S2 Prior Stroke/TIA Yes 2   V Vascular Disease No 0   A Age 74-69 No,  (62 y.o.) 0   Sc Sex male 0    [id number]  Score  4   Score last updated 3/9/20 51:28 AM EDT    Click here for a link to the UpToDate guideline \"Atrial Fibrillation: Anticoagulation therapy to prevent embolization    Disclaimer: Risk Score calculation is dependent on accuracy of patient problem list and past encounter diagnosis. IMPRESSION:    1. Paroxysmal atrial [id number] Score 2 (DM and HTN)  03/10/2020  Patient is a 68-year-old male with a medical history significant for diabetes, asthma, hypertension, and paroxysmal atrial fibrillation who presents with recurrent episodes of palpitations and tachycardia with heart rates in the 120s to 130s. Patient was diagnosed with atrial fibrillation based on a monitor back in 2016 that showed a low burden of atrial fibrillation approximately 0.5%. Since then he has been on anticoagulation and rate control. He is compliant with all his medications. He denies history of stroke. He was referred to me because of a resting heart rate with mild activity in the 120s to 130s mistyped being on a beta-blocker. At this point I am unclear what his tachycardia is from. He does have a history of atrial tachycardia before in the past.  In order to see what rhythm he is in during these episodes I would like to offer him a one-week monitor since episodes occur daily. Patient will return monitor and we will follow-up with him as far as the results of the monitor. Ending on the results we will determine whether or not to pursue more aggressive therapy. 03/31/2020  Patient presents for follow-up. He was recently seen in the emergency room with tachycardia/palpitations. He was discharged for follow-up. His monitor shows atrial tachycardia without any atrial fibrillation. We discussed increasing his rate control agents. Patient would like to try this before any other intervention. He will follow-up with me in 3 to 6 months. 07/07/2020  Unfortunately patient suffered a suspected TIA on 06/08/2020. He was evaluated by neurology ( along with neurologist).   His scan of his head showed severe stenosis without acute thrombus. Patient continues on apixaban however has a strong family history of strokes. Discussed switching him from apixaban to Xarelto. We also discussed warfarin however patient understood and warfarin at this time. As he had a TIA patient is initiated evaluation for atrial fibrillation as it is unclear from his previous monitor whether or not his atrial fibrillation has returned. 10/01/2020  Present for follow-up. He was recently admitted to him emergency [facility] for acute gastroenteritis. His rivaroxaban was changed to apixaban. I initially changed his therapy from apixaban to rivaroxaban because of concern that he may have had a stroke while on apixaban. I do think that apixaban is a better anticoagulant especially people with recurrent strokes. I will therefore advised him to continue on apixaban for now. We will move forward with a loop monitor.  - Plan for ILR.  - Restart apixaban. - Stop flecainide because of fatigue and side effects. Hopefully ILR will help us determine if need need antiarrhythmic strategy. - Increase metoprolol to 100 mg BID.  - Follow up after ILR. RECOMMENDATIONS:  1. Stop Flecainide  2. Proceed with Loop recorder implant. NOTHING TO EAT OR DRINK AFTER MIDNIGHT. NO LOTIONS OR CREAMS ON SKIN. MORNING MEDICATIONS OKAY WITH A SIP OF WATER ONLY. 3.  Increase Metoprolol to 100 mg twice a day  4. Stop Eliquis and start back on Xarelto due to TIA on Eliquis    QUALITY MEASURES  1. Tobacco Cessation Counseling: NA  2. Retake of BP if >140/90:   Yes  3. Documentation to PCP/referring for new patient:  Sent to PCP at close of office visit  4. CAD patient on anti-platelet: No  5. CAD patient on STATIN therapy:  No  6. Patient with CHF and aFib on anticoagulation:  Yes     All questions and concerns were addressed to the patient/family. Alternatives to my treatment were discussed.     This note was scribed in the presence of Roxana Monk MD by Bernice Porter RN. Dr. Roxana Monk MD  [facility]. [address] [facility]. [address]. [place]  Phone: [phone]  Fax: [phone]     NOTE: This report was transcribed using voice recognition software. Every effort was made to ensure accuracy, however, inadvertent computerized transcription errors may be present. The scribe's documentation has been prepared under my direction and personally reviewed by me in its entirety. I confirm that the note above accurately reflects all work, physical examination, the discussion of treatments and procedures, and medical decision making performed by me. Sheila Villatoro MD personally performed the services described in this documentation as scribed by nurse in my presence, and is both accurate and complete.     Electronically signed by Frank Grayson MD on 10/2/2020 at 3:49 PM

## 2020-10-01 ENCOUNTER — OFFICE VISIT (OUTPATIENT)
Dept: CARDIOLOGY CLINIC | Age: 60
End: 2020-10-01
Payer: COMMERCIAL

## 2020-10-01 ENCOUNTER — OFFICE VISIT (OUTPATIENT)
Dept: FAMILY MEDICINE CLINIC | Age: 60
End: 2020-10-01
Payer: COMMERCIAL

## 2020-10-01 ENCOUNTER — TELEPHONE (OUTPATIENT)
Dept: CARDIOLOGY CLINIC | Age: 60
End: 2020-10-01

## 2020-10-01 VITALS
HEIGHT: 76 IN | SYSTOLIC BLOOD PRESSURE: 130 MMHG | DIASTOLIC BLOOD PRESSURE: 82 MMHG | HEART RATE: 88 BPM | BODY MASS INDEX: 32.63 KG/M2 | WEIGHT: 268 LBS | RESPIRATION RATE: 16 BRPM | TEMPERATURE: 97.2 F | OXYGEN SATURATION: 98 %

## 2020-10-01 VITALS
OXYGEN SATURATION: 98 % | WEIGHT: 270 LBS | DIASTOLIC BLOOD PRESSURE: 76 MMHG | HEART RATE: 88 BPM | HEIGHT: 76 IN | BODY MASS INDEX: 32.88 KG/M2 | SYSTOLIC BLOOD PRESSURE: 132 MMHG

## 2020-10-01 PROCEDURE — 3017F COLORECTAL CA SCREEN DOC REV: CPT | Performed by: INTERNAL MEDICINE

## 2020-10-01 PROCEDURE — 3017F COLORECTAL CA SCREEN DOC REV: CPT | Performed by: FAMILY MEDICINE

## 2020-10-01 PROCEDURE — 90686 IIV4 VACC NO PRSV 0.5 ML IM: CPT | Performed by: FAMILY MEDICINE

## 2020-10-01 PROCEDURE — 1036F TOBACCO NON-USER: CPT | Performed by: INTERNAL MEDICINE

## 2020-10-01 PROCEDURE — G8417 CALC BMI ABV UP PARAM F/U: HCPCS | Performed by: FAMILY MEDICINE

## 2020-10-01 PROCEDURE — G8482 FLU IMMUNIZE ORDER/ADMIN: HCPCS | Performed by: INTERNAL MEDICINE

## 2020-10-01 PROCEDURE — 90471 IMMUNIZATION ADMIN: CPT | Performed by: FAMILY MEDICINE

## 2020-10-01 PROCEDURE — 1036F TOBACCO NON-USER: CPT | Performed by: FAMILY MEDICINE

## 2020-10-01 PROCEDURE — 3044F HG A1C LEVEL LT 7.0%: CPT | Performed by: FAMILY MEDICINE

## 2020-10-01 PROCEDURE — G8417 CALC BMI ABV UP PARAM F/U: HCPCS | Performed by: INTERNAL MEDICINE

## 2020-10-01 PROCEDURE — G8427 DOCREV CUR MEDS BY ELIG CLIN: HCPCS | Performed by: FAMILY MEDICINE

## 2020-10-01 PROCEDURE — 99214 OFFICE O/P EST MOD 30 MIN: CPT | Performed by: FAMILY MEDICINE

## 2020-10-01 PROCEDURE — 1111F DSCHRG MED/CURRENT MED MERGE: CPT | Performed by: FAMILY MEDICINE

## 2020-10-01 PROCEDURE — G8482 FLU IMMUNIZE ORDER/ADMIN: HCPCS | Performed by: FAMILY MEDICINE

## 2020-10-01 PROCEDURE — 99213 OFFICE O/P EST LOW 20 MIN: CPT | Performed by: INTERNAL MEDICINE

## 2020-10-01 PROCEDURE — G8427 DOCREV CUR MEDS BY ELIG CLIN: HCPCS | Performed by: INTERNAL MEDICINE

## 2020-10-01 PROCEDURE — 1111F DSCHRG MED/CURRENT MED MERGE: CPT | Performed by: INTERNAL MEDICINE

## 2020-10-01 PROCEDURE — 2022F DILAT RTA XM EVC RTNOPTHY: CPT | Performed by: FAMILY MEDICINE

## 2020-10-01 RX ORDER — METOPROLOL SUCCINATE 100 MG/1
TABLET, EXTENDED RELEASE ORAL
Qty: 180 TABLET | Refills: 3 | Status: SHIPPED | OUTPATIENT
Start: 2020-10-01 | End: 2020-10-15 | Stop reason: SDUPTHER

## 2020-10-01 RX ORDER — FLECAINIDE ACETATE 50 MG/1
50 TABLET ORAL 2 TIMES DAILY
COMMUNITY
End: 2020-10-01 | Stop reason: ALTCHOICE

## 2020-10-01 RX ORDER — GLIPIZIDE 10 MG/1
10 TABLET ORAL
Qty: 60 TABLET | Refills: 5 | Status: SHIPPED | OUTPATIENT
Start: 2020-10-01 | End: 2021-01-05 | Stop reason: SDUPTHER

## 2020-10-01 RX ORDER — FLECAINIDE ACETATE 50 MG/1
50 TABLET ORAL 2 TIMES DAILY
Status: ON HOLD | COMMUNITY
Start: 2020-09-16 | End: 2020-10-16 | Stop reason: HOSPADM

## 2020-10-01 SDOH — HEALTH STABILITY: MENTAL HEALTH
STRESS IS WHEN SOMEONE FEELS TENSE, NERVOUS, ANXIOUS, OR CAN'T SLEEP AT NIGHT BECAUSE THEIR MIND IS TROUBLED. HOW STRESSED ARE YOU?: ONLY A LITTLE

## 2020-10-01 SDOH — ECONOMIC STABILITY: INCOME INSECURITY: HOW HARD IS IT FOR YOU TO PAY FOR THE VERY BASICS LIKE FOOD, HOUSING, MEDICAL CARE, AND HEATING?: NOT HARD AT ALL

## 2020-10-01 SDOH — ECONOMIC STABILITY: TRANSPORTATION INSECURITY
IN THE PAST 12 MONTHS, HAS THE LACK OF TRANSPORTATION KEPT YOU FROM MEDICAL APPOINTMENTS OR FROM GETTING MEDICATIONS?: NO

## 2020-10-01 SDOH — ECONOMIC STABILITY: FOOD INSECURITY: WITHIN THE PAST 12 MONTHS, YOU WORRIED THAT YOUR FOOD WOULD RUN OUT BEFORE YOU GOT MONEY TO BUY MORE.: NEVER TRUE

## 2020-10-01 SDOH — ECONOMIC STABILITY: TRANSPORTATION INSECURITY
IN THE PAST 12 MONTHS, HAS LACK OF TRANSPORTATION KEPT YOU FROM MEETINGS, WORK, OR FROM GETTING THINGS NEEDED FOR DAILY LIVING?: NO

## 2020-10-01 SDOH — ECONOMIC STABILITY: FOOD INSECURITY: WITHIN THE PAST 12 MONTHS, THE FOOD YOU BOUGHT JUST DIDN'T LAST AND YOU DIDN'T HAVE MONEY TO GET MORE.: NEVER TRUE

## 2020-10-01 SDOH — HEALTH STABILITY: PHYSICAL HEALTH: ON AVERAGE, HOW MANY MINUTES DO YOU ENGAGE IN EXERCISE AT THIS LEVEL?: 0 MIN

## 2020-10-01 SDOH — HEALTH STABILITY: PHYSICAL HEALTH: ON AVERAGE, HOW MANY DAYS PER WEEK DO YOU ENGAGE IN MODERATE TO STRENUOUS EXERCISE (LIKE A BRISK WALK)?: 0 DAYS

## 2020-10-01 NOTE — PROGRESS NOTES
Post-Discharge Transitional Care Management Services or Hospital Follow Up      Willi Cornell   YOB: 1960    Date of Office Visit:  10/1/2020  Date of Hospital Admission: 6/8/20  Date of Hospital Discharge: 6/10/20  Risk of hospital readmission (high >=14%.  Medium >=10%) :No data recorded    Care management risk score Rising risk (score 2-5) and Complex Care (Scores >=6): 10     Non face to face  following discharge, date last encounter closed (first attempt may have been earlier): *No documented post hospital discharge outreach found in the last 14 days    Call initiated 2 business days of discharge: *No response recorded in the last 14 days    Patient Active Problem List   Diagnosis    Asthma    Asthma exacerbation    [facility]    Smoker    Acute respiratory failure ([facility]    Essential hypertension    Mixed hyperlipidemia    Paroxysmal tachycardia ([facility] [facility]    PAF (paroxysmal atrial fibrillation) ([facility] [facility]    Type 2 diabetes mellitus without complication, without long-term current use of insulin ([facility] [facility]    Acute ischemic multifocal anterior circulation stroke ([facility] [facility]    TIA involving right internal carotid artery    DM type 2, controlled, with complication ([facility] [facility]    Hypertensive urgency    HTN (hypertension), benign    Dyslipidemia       Allergies   Allergen Reactions    Penicillins Shortness Of Breath    Codeine Hives       Medications listed as ordered at the time of discharge from hospital   Abbie Tobias \"Richard\"   Home Medication Instructions WILLY:    Printed on:10/01/20 1630   Medication Information                      albuterol sulfate (PROAIR RESPICLICK) 190 (90 Base) MCG/ACT aerosol powder inhalation  INHALE 2 PUFFS EVERY 6 HOURS AS NEEDED FOR WHEEZING/SHORTNESS OF BREATH             aspirin 81 MG tablet  Take 81 mg by mouth daily             atorvastatin (LIPITOR) 40 MG tablet  TAKE 1 TABLET BY MOUTH DAILY AT BEDTIME             CVS LANCETS atorvastatin (LIPITOR) 40 MG tablet TAKE 1 TABLET BY MOUTH DAILY AT BEDTIME 90 tablet 1    fluticasone-vilanterol (BREO ELLIPTA) 100-25 MCG/INH AEPB inhaler Inhale 1 puff into the lungs daily 30 each 5    fluticasone (FLONASE) 50 MCG/ACT nasal spray 2 sprays by Each Nostril route daily (Patient taking differently: 2 sprays by Each Nostril route daily as needed ) 3 Bottle 1    aspirin 81 MG tablet Take 81 mg by mouth daily      albuterol sulfate (PROAIR RESPICLICK) 596 (90 Base) MCG/ACT aerosol powder inhalation INHALE 2 PUFFS EVERY 6 HOURS AS NEEDED FOR WHEEZING/SHORTNESS OF BREATH 1 Inhaler 11    vitamin D (CHOLECALCIFEROL) 1000 UNIT TABS tablet Take 2,000 Units by mouth 2 times daily       glucose blood VI test strips (ASCENSIA AUTODISC VI;ONE TOUCH ULTRA TEST VI) strip Test blood sugars twice daily 100 each 99    CVS LANCETS ORIGINAL MISC Check twice daily. Lancets approved by [employer] and patient choice 100 each 3        Medications patient taking as of now reconciled against medications ordered at time of hospital discharge: Yes    Chief Complaint   Patient presents with    Follow-Up from Hospital     vomitting and fever       History of Present illness - Follow up of Hospital diagnosis(es):    Mr. Damaris Mcardle is a 57 yo Male patient of Dr. Harpreet Velasquez with [facility] [place] with PMHx significant for PAF, PAT, TIA of right ICA, DM2, HLD, HTN, and Asthma who presents with N/V/D, Fever, Fatigue, and Tachycardia.     1. PAT/Afib/Aflutter?: Patient with hx Afib (dx on monitor in 2016, 0.5% burden) and PAT on Eliquis for [address] [place] [address]. Patient presented to ED with 2-day hx fatigue, n/v/d. EKG with atrial tachycardia with  bpm, no acute STT changes. Low Mg on presentation at 1.3; replaced, now at 1.6. Also with elevated CRP with mild lymphopenia. Given IVF in ED w/o improvement in HR. Attempted CV with 6 mg and 12 mg, did not sustain CV. Given IV Metoprolol 5 mg IV, then started on home Toprol XL 75 mg po daily. Echocardiogram (8/2017) EF 60%, mild LVH, G1DD. Stress Echo (2016) EF 55-60%, negative for ischemia. Per Neuro, scan of head showed severe stenosis w/o acute thrombus. Discussed switching from Eliquis to Xarelto d/t TIA on Eliquis and strong family hx stroke, but Xarelto too expensive for patient  -Abnormal nuclear stress with inferior wall ischemia. [facility] today. -Continue Heparin drip  -in SR, rate controlled on BB. Continue Toprol XL at 100 mg in AM and 50 mg in PM     2. Hypertension: Controlled on home medications  -Lisinopril 10 mg daily on hold currently to allow for increase in HR controlling meds  -Continue BB      3. Hyperlipidemia: ASCVD risk high. On Atrovastatin 40 mg  -Continue statin      Inpatient course: Discharge summary reviewed- see chart. Interval history/Current status: stable    A comprehensive review of systems was negative except for what was noted in the HPI. Vitals:    10/01/20 1550   BP: 130/82   Site: Right Upper Arm   Position: Sitting   Cuff Size: Large Adult   Pulse: 88   Resp: 16   Temp: 97.2 °F (36.2 °C)   TempSrc: Oral   SpO2: 98%   Weight: 268 lb (121.6 kg)   Height: 6' 4\" (1.93 m)     Body mass index is 32.62 kg/m².    Wt Readings from Last 3 Encounters:   10/01/20 268 lb (121.6 kg)   10/01/20 270 lb (122.5 kg)   08/11/20 274 lb (124.3 kg)     BP Readings from Last 3 Encounters:   10/01/20 130/82   10/01/20 132/76   08/11/20 (!) 148/88        Physical Exam:  General Appearance: alert and oriented to person, place and time, well developed and well- nourished, in no acute distress  Skin: warm and dry, no rash or erythema  Head: normocephalic and atraumatic  Neck: supple and non-tender without mass, no thyromegaly or thyroid nodules, no cervical lymphadenopathy  Pulmonary/Chest: clear to auscultation bilaterally- no wheezes, rales or rhonchi, normal air movement, no respiratory distress  Cardiovascular: normal rate, regular rhythm, normal S1 and S2, no murmurs, rubs, clicks, or gallops, distal pulses intact, no carotid bruits  Abdomen: soft, non-tender, non-distended, normal bowel sounds, no masses or organomegaly  Extremities: no cyanosis, clubbing or edema  Musculoskeletal: normal range of motion, no joint swelling, deformity or tenderness    Assessment/Plan:  1. Paroxysmal tachycardia ([address]  Resolved. 2. PAF (paroxysmal atrial fibrillation) ([facility])  Stable. Continue current medications. 3. Type 2 diabetes mellitus without complication, without long-term current use of insulin ([facility])  Increase to 10 mg BID. - glipiZIDE (GLUCOTROL) 10 MG tablet; Take 1 tablet by mouth 2 times daily (before meals)  Dispense: 60 tablet; Refill: 5    4.  Need for influenza vaccination  - INFLUENZA, QUADV, 3 YRS AND OLDER, IM PF, PREFILL SYR OR SDV, 0.5ML (AFLURIA QUADV, PF)        Medical Decision Making: moderate complexity

## 2020-10-01 NOTE — PROGRESS NOTES
Vaccine Information Sheet, \"Influenza - Inactivated\"  given to Rogelio Ingram, or parent/legal guardian of  Rogelio Ingram and verbalized understanding. Patient responses:    Have you ever had a reaction to a flu vaccine? No  Are you able to eat eggs without adverse effects? Yes  Do you have any current illness? No  Have you ever had Guillian Henry Syndrome? No    Flu vaccine given per order. Please see immunization tab.

## 2020-10-02 PROCEDURE — 93000 ELECTROCARDIOGRAM COMPLETE: CPT | Performed by: INTERNAL MEDICINE

## 2020-10-12 ENCOUNTER — TELEPHONE (OUTPATIENT)
Dept: CARDIOLOGY CLINIC | Age: 60
End: 2020-10-12

## 2020-10-12 NOTE — TELEPHONE ENCOUNTER
Spoke with patient. Patient is scheduled with Dr. Uli Aaron for Loop Implant with Medtronic on 10/16/20 at [facility], arrival time of 6:30am to the Cath Lab. Please have patient arrive to the main entrance of [facility] and check in with the registration desk. Please call patient regarding medication instructions. Remind patient to be NPO after midnight (8 hours prior). Do not apply lotions/creams on skin the day of procedure. COVID testing -NO.

## 2020-10-13 NOTE — TELEPHONE ENCOUNTER
I spoke with patient. He should hold his Metformin and glipizide the morning of the procedure. He can take his other medications with a small sip of water. patient to be NPO after midnight (8 hours prior). Do not apply lotions/creams on skin the day of procedure.

## 2020-10-15 ENCOUNTER — TELEPHONE (OUTPATIENT)
Dept: FAMILY MEDICINE CLINIC | Age: 60
End: 2020-10-15

## 2020-10-15 RX ORDER — METOPROLOL SUCCINATE 100 MG/1
100 TABLET, EXTENDED RELEASE ORAL 2 TIMES DAILY
Qty: 180 TABLET | Refills: 3 | Status: SHIPPED | OUTPATIENT
Start: 2020-10-15 | End: 2021-01-20 | Stop reason: SDUPTHER

## 2020-10-15 NOTE — TELEPHONE ENCOUNTER
Pt calling to say that he has ran out of metoprolol because he was instructed to take 2 tablets daily. Pt said that the pharmacy will not give him a refill until tomorrow but pt is out of meds today. Can a call be placed to [facility] [phone].

## 2020-10-15 NOTE — TELEPHONE ENCOUNTER
Submitted PA for Meri Jaramillo 250-50MCG/DOSE aerosol powder, Key: [id number]. Medication has been DENIED. Denial letter attached. Please notify patient. Thank you.

## 2020-10-16 ENCOUNTER — HOSPITAL ENCOUNTER (OUTPATIENT)
Dept: CARDIAC CATH/INVASIVE PROCEDURES | Age: 60
Discharge: HOME | End: 2020-10-16
Attending: INTERNAL MEDICINE | Admitting: INTERNAL MEDICINE
Payer: COMMERCIAL

## 2020-10-16 VITALS — HEIGHT: 76 IN | BODY MASS INDEX: 33.73 KG/M2 | WEIGHT: 277 LBS

## 2020-10-16 PROBLEM — Z45.09 ENCOUNTER FOR LOOP RECORDER CHECK: Status: ACTIVE | Noted: 2020-10-16

## 2020-10-16 LAB
EKG ATRIAL RATE: 104 BPM
EKG DIAGNOSIS: NORMAL
EKG P AXIS: 64 DEGREES
EKG P-R INTERVAL: 152 MS
EKG Q-T INTERVAL: 346 MS
EKG QRS DURATION: 80 MS
EKG QTC CALCULATION (BAZETT): 454 MS
EKG R AXIS: 35 DEGREES
EKG T AXIS: 89 DEGREES
EKG VENTRICULAR RATE: 104 BPM

## 2020-10-16 PROCEDURE — 93005 ELECTROCARDIOGRAM TRACING: CPT | Performed by: INTERNAL MEDICINE

## 2020-10-16 PROCEDURE — 33285 INSJ SUBQ CAR RHYTHM MNTR: CPT | Performed by: INTERNAL MEDICINE

## 2020-10-16 PROCEDURE — 93010 ELECTROCARDIOGRAM REPORT: CPT | Performed by: INTERNAL MEDICINE

## 2020-10-16 PROCEDURE — C1764 EVENT RECORDER, CARDIAC: HCPCS

## 2020-10-16 PROCEDURE — 2500000003 HC RX 250 WO HCPCS

## 2020-10-16 PROCEDURE — 33285 INSJ SUBQ CAR RHYTHM MNTR: CPT

## 2020-10-16 RX ORDER — SODIUM CHLORIDE 0.9 % (FLUSH) 0.9 %
10 SYRINGE (ML) INJECTION PRN
Status: DISCONTINUED | OUTPATIENT
Start: 2020-10-16 | End: 2020-10-16 | Stop reason: HOSPADM

## 2020-10-16 RX ORDER — SODIUM CHLORIDE 0.9 % (FLUSH) 0.9 %
10 SYRINGE (ML) INJECTION EVERY 12 HOURS SCHEDULED
Status: DISCONTINUED | OUTPATIENT
Start: 2020-10-16 | End: 2020-10-16 | Stop reason: HOSPADM

## 2020-10-16 NOTE — PROCEDURES
Electrophysiology Procedure Report    Attending MD Toyin Kingston MD    Procedure Loop recorder implant  Indication   Cerebral vascular disease  Complication None  EBL  <83PL    Description of Procedure  The patient was brought to the electrophysiology laboratory after informed consent was obtained. The patient was placed in the supine position and the left chest was prepared and draped in a sterile fashion. The electrocardiogram, blood pressure, and oxygenation were monitored intra- and post-procedure. After using buffered lidocaine 1% with epinephrine (1/100,000) for local anesthesia to the left parasternal subcutaneous area, the Medtronic Linq insertion tool was used to make a 1cm incision. The subcutaneous tissues were dissected using the Linq insertion tool and the device was deployed using the insertion tool. Poly-cyanoacrylate solution was applied to the site and a sterile dressing was applied. The patient tolerated the procedure well and there were no complications. The attending physician, Toyin Kingston MD, was present for the entire procedure and for interpretation of data.     Device Information  Monitor Model # Manfacturer Serial # Location   Reveal Linq [id number] Medtronic [id number] left pectoral, subcutaneous     Settings  Zone Detection ECG Recording Interval (rate) Duration   Tachy On On 500 ms (120 bpm) 16 beats   Asystole On On n/a 3 sec   Jeremiah On On 2000 ms (30 bpm) 4 beats     Sensing - 0.35    Contrast  0 cc  Fluoro time  0 minutes

## 2020-12-01 PROCEDURE — 93298 REM INTERROG DEV EVAL SCRMS: CPT | Performed by: INTERNAL MEDICINE

## 2020-12-01 PROCEDURE — G2066 INTER DEVC REMOTE 30D: HCPCS | Performed by: INTERNAL MEDICINE

## 2020-12-02 ENCOUNTER — NURSE ONLY (OUTPATIENT)
Dept: CARDIOLOGY CLINIC | Age: 60
End: 2020-12-02
Payer: COMMERCIAL

## 2020-12-03 RX ORDER — ALBUTEROL SULFATE 90 UG/1
POWDER, METERED RESPIRATORY (INHALATION)
Qty: 1 INHALER | Refills: 11 | Status: SHIPPED | OUTPATIENT
Start: 2020-12-03 | End: 2021-01-05

## 2020-12-03 NOTE — TELEPHONE ENCOUNTER
Last office visit 10/1/2020     Last written  7- x 11 refills but pharmacy states last refill given 11-.     Next office visit scheduled 1/5/2021    Requested Prescriptions     Pending Prescriptions Disp Refills    albuterol sulfate (PROAIR RESPICLICK) 600 (90 Base) MCG/ACT aerosol powder inhalation 1 Inhaler 11     Sig: INHALE 2 PUFFS EVERY 6 HOURS AS NEEDED FOR WHEEZING/SHORTNESS OF BREATH

## 2021-01-05 ENCOUNTER — OFFICE VISIT (OUTPATIENT)
Dept: FAMILY MEDICINE CLINIC | Age: 61
End: 2021-01-05
Payer: COMMERCIAL

## 2021-01-05 ENCOUNTER — TELEPHONE (OUTPATIENT)
Dept: FAMILY MEDICINE CLINIC | Age: 61
End: 2021-01-05

## 2021-01-05 VITALS
HEART RATE: 81 BPM | DIASTOLIC BLOOD PRESSURE: 82 MMHG | OXYGEN SATURATION: 96 % | WEIGHT: 289 LBS | TEMPERATURE: 97 F | BODY MASS INDEX: 37.09 KG/M2 | SYSTOLIC BLOOD PRESSURE: 136 MMHG | HEIGHT: 74 IN

## 2021-01-05 DIAGNOSIS — M54.2 NECK PAIN: ICD-10-CM

## 2021-01-05 DIAGNOSIS — E78.2 MIXED HYPERLIPIDEMIA: ICD-10-CM

## 2021-01-05 DIAGNOSIS — E11.9 TYPE 2 DIABETES MELLITUS WITHOUT COMPLICATION, WITHOUT LONG-TERM CURRENT USE OF INSULIN (HCC): Primary | ICD-10-CM

## 2021-01-05 DIAGNOSIS — I10 ESSENTIAL HYPERTENSION: ICD-10-CM

## 2021-01-05 LAB — HBA1C MFR BLD: 7.4 %

## 2021-01-05 PROCEDURE — G8482 FLU IMMUNIZE ORDER/ADMIN: HCPCS | Performed by: FAMILY MEDICINE

## 2021-01-05 PROCEDURE — 1036F TOBACCO NON-USER: CPT | Performed by: FAMILY MEDICINE

## 2021-01-05 PROCEDURE — 3051F HG A1C>EQUAL 7.0%<8.0%: CPT | Performed by: FAMILY MEDICINE

## 2021-01-05 PROCEDURE — G8427 DOCREV CUR MEDS BY ELIG CLIN: HCPCS | Performed by: FAMILY MEDICINE

## 2021-01-05 PROCEDURE — G8417 CALC BMI ABV UP PARAM F/U: HCPCS | Performed by: FAMILY MEDICINE

## 2021-01-05 PROCEDURE — 99214 OFFICE O/P EST MOD 30 MIN: CPT | Performed by: FAMILY MEDICINE

## 2021-01-05 PROCEDURE — 2022F DILAT RTA XM EVC RTNOPTHY: CPT | Performed by: FAMILY MEDICINE

## 2021-01-05 PROCEDURE — 83036 HEMOGLOBIN GLYCOSYLATED A1C: CPT | Performed by: FAMILY MEDICINE

## 2021-01-05 PROCEDURE — 3017F COLORECTAL CA SCREEN DOC REV: CPT | Performed by: FAMILY MEDICINE

## 2021-01-05 RX ORDER — GLIPIZIDE 10 MG/1
10 TABLET ORAL
Qty: 60 TABLET | Refills: 5 | Status: SHIPPED | OUTPATIENT
Start: 2021-01-05 | End: 2021-03-18 | Stop reason: SDUPTHER

## 2021-01-05 RX ORDER — LISINOPRIL 10 MG/1
TABLET ORAL
Qty: 90 TABLET | Refills: 1 | Status: SHIPPED | OUTPATIENT
Start: 2021-01-05 | End: 2021-07-29

## 2021-01-05 RX ORDER — METHOCARBAMOL 500 MG/1
500 TABLET, FILM COATED ORAL 3 TIMES DAILY PRN
Qty: 30 TABLET | Refills: 1 | Status: SHIPPED | OUTPATIENT
Start: 2021-01-05

## 2021-01-05 RX ORDER — ATORVASTATIN CALCIUM 40 MG/1
TABLET, FILM COATED ORAL
Qty: 90 TABLET | Refills: 1 | Status: SHIPPED | OUTPATIENT
Start: 2021-01-05 | End: 2021-05-25

## 2021-01-05 ASSESSMENT — PATIENT HEALTH QUESTIONNAIRE - PHQ9: SUM OF ALL RESPONSES TO PHQ QUESTIONS 1-9: 0

## 2021-01-05 NOTE — PROGRESS NOTES
Rylee Rodriguez is a 56 y.o. male    Chief Complaint   Patient presents with    Diabetes    Hypertension    Hyperlipidemia    Neck Pain       HPI:    Diabetes  He presents for his follow-up diabetic visit. He has type 2 diabetes mellitus. His disease course has been stable. Pertinent negatives for diabetes include no polydipsia. Diabetic complications include heart disease. Risk factors for coronary artery disease include diabetes mellitus, hypertension, male sex and obesity. When asked about current treatments, none were reported. An ACE inhibitor/angiotensin II receptor blocker is not being taken. Hypertension  This is a chronic problem. The current episode started more than 1 year ago. The problem is unchanged. The problem is controlled. Associated symptoms include neck pain. Risk factors for coronary artery disease include diabetes mellitus, male gender and obesity. Past treatments include ACE inhibitors and beta blockers. The current treatment provides significant improvement. There are no compliance problems. Hypertensive end-organ damage includes CAD/MI. There is no history of kidney disease. Hyperlipidemia  This is a chronic problem. The current episode started more than 1 year ago. The problem is controlled. Recent lipid tests were reviewed and are low. Exacerbating diseases include diabetes. Pertinent negatives include no myalgias. Current antihyperlipidemic treatment includes statins. The current treatment provides moderate improvement of lipids. Compliance problems include medication cost.  Risk factors for coronary artery disease include hypertension, male sex, obesity and diabetes mellitus. Neck Pain   This is a new problem. The current episode started in the past 7 days. The problem occurs daily. The problem has been gradually worsening. The pain is associated with nothing. The pain is present in the midline and left side. Pertinent negatives include no tingling.  He has tried NSAIDs for the symptoms. The treatment provided significant relief. ROS:    Review of Systems   Endocrine: Negative for polydipsia. Musculoskeletal: Positive for neck pain. Negative for myalgias. Neurological: Negative for tingling. /82 (Site: Left Upper Arm, Position: Sitting, Cuff Size: Large Adult)   Pulse 81   Temp 97 °F (36.1 °C) (Temporal)   Ht 6' 2\" (1.88 m)   Wt 289 lb (131.1 kg)   SpO2 96%   BMI 37.11 kg/m²     Physical Exam:    Physical Exam  Constitutional:       General: He is not in acute distress. Appearance: Normal appearance. He is obese. He is not ill-appearing. Musculoskeletal:      Cervical back: He exhibits tenderness. Neurological:      Mental Status: He is alert. Psychiatric:         Mood and Affect: Mood normal.         Behavior: Behavior normal.         Thought Content:  Thought content normal.         Current Outpatient Medications   Medication Sig Dispense Refill    fluticasone-salmeterol (ADVAIR) 250-50 MCG/DOSE AEPB Inhale 1 puff into the lungs every 12 hours 60 each 11    metFORMIN (GLUCOPHAGE) 500 MG tablet Take 2 tablets by mouth 2 times daily (with meals) 120 tablet 5    atorvastatin (LIPITOR) 40 MG tablet TAKE 1 TABLET BY MOUTH DAILY AT BEDTIME 90 tablet 1    lisinopril (PRINIVIL;ZESTRIL) 10 MG tablet TAKE ONE TABLET BY MOUTH DAILY 90 tablet 1    methocarbamol (ROBAXIN) 500 MG tablet Take 1 tablet by mouth 3 times daily as needed (pain) 30 tablet 1    glipiZIDE (GLUCOTROL) 10 MG tablet Take 1 tablet by mouth 2 times daily (before meals) 60 tablet 5    metoprolol succinate (TOPROL XL) 100 MG extended release tablet Take 1 tablet by mouth 2 times daily Take 100mg in the morning and 50 mg at night 180 tablet 3    fluticasone (FLONASE) 50 MCG/ACT nasal spray 2 sprays by Each Nostril route daily (Patient taking differently: 2 sprays by Each Nostril route daily as needed ) 3 Bottle 1    aspirin 81 MG tablet Take 81 mg by mouth daily      albuterol sulfate (PROAIR RESPICLICK) 095 (90 Base) MCG/ACT aerosol powder inhalation INHALE 2 PUFFS EVERY 6 HOURS AS NEEDED FOR WHEEZING/SHORTNESS OF BREATH 1 Inhaler 11    vitamin D (CHOLECALCIFEROL) 1000 UNIT TABS tablet Take 2,000 Units by mouth 2 times daily       glucose blood VI test strips (ASCENSIA AUTODISC VI;ONE TOUCH ULTRA TEST VI) strip Test blood sugars twice daily 100 each 99    CVS LANCETS ORIGINAL MISC Check twice daily. Lancets approved by insurance company and patient choice 100 each 3    fluticasone-vilanterol (BREO ELLIPTA) 100-25 MCG/INH AEPB inhaler Inhale 1 puff into the lungs daily 30 each 5     No current facility-administered medications for this visit. Assessment:    1. Type 2 diabetes mellitus without complication, without long-term current use of insulin ([facility] [address] [address]    2. Essential hypertension    3. Mixed hyperlipidemia    4. Neck pain        Plan:    1. Type 2 diabetes mellitus without complication, without long-term current use of insulin ([facility])  Stable. Continue current medications. - metFORMIN (GLUCOPHAGE) 500 MG tablet; Take 2 tablets by mouth 2 times daily (with meals)  Dispense: 120 tablet; Refill: 5  - glipiZIDE (GLUCOTROL) 5 MG tablet; Take 1 tablet by mouth 2 times daily  Dispense: 60 tablet; Refill: 3  - HM DIABETES FOOT EXAM    2. Essential hypertension  Stable. Continue current medications. - lisinopril (PRINIVIL;ZESTRIL) 10 MG tablet; TAKE ONE TABLET BY MOUTH DAILY  Dispense: 90 tablet; Refill: 1    3. Mixed hyperlipidemia  Stable. Continue current medications. - atorvastatin (LIPITOR) 40 MG tablet; TAKE 1 TABLET BY MOUTH DAILY AT BEDTIME  Dispense: 90 tablet; Refill: 1    4. Neck pain  Try exercises, Biofreeze and Tylenol prn pain      Return in about 6 months (around 7/5/2021) for Preventative.

## 2021-01-05 NOTE — PATIENT INSTRUCTIONS
Patient Education        Neck: Exercises  Introduction  Here are some examples of exercises for you to try. The exercises may be suggested for a condition or for rehabilitation. Start each exercise slowly. Ease off the exercises if you start to have pain. You will be told when to start these exercises and which ones will work best for you. How to do the exercises  Neck stretch   1. This stretch works best if you keep your shoulder down as you lean away from it. To help you remember to do this, start by relaxing your shoulders and lightly holding on to your thighs or your chair. 2. Tilt your head toward your shoulder and hold for 15 to 30 seconds. Let the weight of your head stretch your muscles. 3. If you would like a little added stretch, use your hand to gently and steadily pull your head toward your shoulder. For example, keeping your right shoulder down, lean your head to the left. 4. Repeat 2 to 4 times toward each shoulder. Diagonal neck stretch   1. Turn your head slightly toward the direction you will be stretching, and tilt your head diagonally toward your chest and hold for 15 to 30 seconds. 2. If you would like a little added stretch, use your hand to gently and steadily pull your head forward on the diagonal.  3. Repeat 2 to 4 times toward each side. Dorsal glide stretch   The dorsal glide stretches the back of the neck. If you feel pain, do not glide so far back. Some people find this exercise easier to do while lying on their backs with an ice pack on the neck. 1. Sit or stand tall and look straight ahead. 2. Slowly tuck your chin as you glide your head backward over your body  3. Hold for a count of 6, and then relax for up to 10 seconds. 4. Repeat 8 to 12 times. Chest and shoulder stretch   1. Sit or stand tall and glide your head backward as in the dorsal glide stretch. 2. Raise both arms so that your hands are next to your ears. 3. Take a deep breath, and as you breathe out, lower your elbows down and behind your back. You will feel your shoulder blades slide down and together, and at the same time you will feel a stretch across your chest and the front of your shoulders. 4. Hold for about 6 seconds, and then relax for up to 10 seconds. 5. Repeat 8 to 12 times. Strengthening: Hands on head   1. Move your head backward, forward, and side to side against gentle pressure from your hands, holding each position for about 6 seconds. 2. Repeat 8 to 12 times. Follow-up care is a key part of your treatment and safety. Be sure to make and go to all appointments, and call your doctor if you are having problems. It's also a good idea to know your test results and keep a list of the medicines you take. Where can you learn more? Go to [email or web address] and sign in to your [employer] account. Enter P975 in the STACK Media box to learn more about \"Neck: Exercises. \"     If you do not have an account, please click on the \"Sign Up Now\" link. Current as of: March 2, 2020               Content Version: 12.6  © 6562-5392 [employer]. Care instructions adapted under license by [facility] ([facility]). If you have questions about a medical condition or this instruction, always ask your healthcare professional. [place] [address] any warranty or liability for your use of this information. Patient Education        Neck Arthritis: Exercises  Introduction  Here are some examples of exercises for you to try. The exercises may be suggested for a condition or for rehabilitation. Start each exercise slowly. Ease off the exercises if you start to have pain. You will be told when to start these exercises and which ones will work best for you.   How to do the exercises  Neck stretches to the side 1. This stretch works best if you keep your shoulder down as you lean away from it. To help you remember to do this, start by relaxing your shoulders and lightly holding on to your thighs or your chair. 2. Tilt your head toward your shoulder and hold for 15 to 30 seconds. Let the weight of your head stretch your muscles. 3. Repeat 2 to 4 times toward each shoulder. Chin tuck   1. Lie on the floor with a rolled-up towel under your neck. Your head should be touching the floor. 2. Slowly bring your chin toward your chest.  3. Hold for a count of 6, and then relax for up to 10 seconds. 4. Repeat 8 to 12 times. Active cervical rotation   1. Sit in a firm chair, or stand up straight. 2. Keeping your chin level, turn your head to the right, and hold for 15 to 30 seconds. 3. Turn your head to the left and hold for 15 to 30 seconds. 4. Repeat 2 to 4 times to each side. Shoulder blade squeeze   1. While standing, squeeze your shoulder blades together. 2. Do not raise your shoulders up as you are squeezing. 3. Hold for 6 seconds. 4. Repeat 8 to 12 times. Shoulder rolls   1. Sit comfortably with your feet shoulder-width apart. You can also do this exercise standing up. 2. Roll your shoulders up, then back, and then down in a smooth, circular motion. 3. Repeat 2 to 4 times. Follow-up care is a key part of your treatment and safety. Be sure to make and go to all appointments, and call your doctor if you are having problems. It's also a good idea to know your test results and keep a list of the medicines you take. Where can you learn more? Go to https://[email or web address]. org and sign in to your [employer]ada[employer] account. Enter F360 in the [employer]ogis box to learn more about \"Neck Arthritis: Exercises. \"     If you do not have an account, please click on the \"Sign Up Now\" link. Current as of: March 2, 2020               Content Version: 12.6  © 1349-8915 [employer]. Care instructions adapted under license by [facility] ([facility]). If you have questions about a medical condition or this instruction, always ask your healthcare professional. [address] any warranty or liability for your use of this information.

## 2021-01-06 ENCOUNTER — NURSE ONLY (OUTPATIENT)
Dept: CARDIOLOGY CLINIC | Age: 61
End: 2021-01-06
Payer: COMMERCIAL

## 2021-01-06 DIAGNOSIS — Z45.09 ENCOUNTER FOR LOOP RECORDER CHECK: ICD-10-CM

## 2021-01-06 DIAGNOSIS — I47.9 PAROXYSMAL TACHYCARDIA (HCC): ICD-10-CM

## 2021-01-06 DIAGNOSIS — I48.0 PAF (PAROXYSMAL ATRIAL FIBRILLATION) (HCC): ICD-10-CM

## 2021-01-06 DIAGNOSIS — I63.529 ACUTE ISCHEMIC MULTIFOCAL ANTERIOR CIRCULATION STROKE, UNSPECIFIED LATERALITY (HCC): ICD-10-CM

## 2021-01-06 DIAGNOSIS — G45.1 TIA INVOLVING RIGHT INTERNAL CAROTID ARTERY: ICD-10-CM

## 2021-01-06 PROCEDURE — 93298 REM INTERROG DEV EVAL SCRMS: CPT | Performed by: INTERNAL MEDICINE

## 2021-01-06 PROCEDURE — G2066 INTER DEVC REMOTE 30D: HCPCS | Performed by: INTERNAL MEDICINE

## 2021-01-07 NOTE — PROGRESS NOTES
ILR to monitor for AF. Hx pAF, AT, TIA. (oac-PER MED REC DISCONTINUED 1/5/2021). No AFib recorded to date. Tachycardia recorded, ST @ 128 bpm x 18 sec. No symptom episodes recorded. Average v rates decreased from 100 bpm-80 pm around 12/20/20. Follow up 1 month [facility] [employer] [place]. Pt takes toprol xl. Per epic noted pt not taking AC, Dr Avni Pressley do you still want him on [facility]?      rivaroxaban (XARELTO) 20 MG TABS tablet [[id number]]  DISCONTINUED    Order Details  Dose: 20 mg Route: Oral Frequency: DAILY WITH BREAKFAST   Dispense Quantity: 90 tablet Refills: 3          Sig: Take 1 tablet by mouth daily (with breakfast)   Patient not taking: Reported on 1/5/2021         Start Date: 10/01/20 End Date: 01/05/21   Discontinued by: Vicky Jorge MA on 1/5/2021 09:07   Reason: Therapy completed         Written Date: 10/01/20 Expiration Date: 10/01/21   Original Order:  rivaroxaban (Bennye Deed) 20 MG TABS tablet [[id number]]   Providers    Authorizing Provider: Sury Brown MD NPI: [id number]   Ordering User:  Sury rBown MD

## 2021-01-11 DIAGNOSIS — I48.0 PAF (PAROXYSMAL ATRIAL FIBRILLATION) (HCC): Primary | ICD-10-CM

## 2021-01-20 ENCOUNTER — OFFICE VISIT (OUTPATIENT)
Dept: CARDIOLOGY CLINIC | Age: 61
End: 2021-01-20
Payer: COMMERCIAL

## 2021-01-20 VITALS
HEIGHT: 74 IN | WEIGHT: 279 LBS | OXYGEN SATURATION: 97 % | DIASTOLIC BLOOD PRESSURE: 80 MMHG | HEART RATE: 95 BPM | BODY MASS INDEX: 35.81 KG/M2 | SYSTOLIC BLOOD PRESSURE: 138 MMHG

## 2021-01-20 DIAGNOSIS — I47.9 PAROXYSMAL TACHYCARDIA (HCC): ICD-10-CM

## 2021-01-20 DIAGNOSIS — I10 ESSENTIAL HYPERTENSION: Primary | ICD-10-CM

## 2021-01-20 DIAGNOSIS — I48.0 PAF (PAROXYSMAL ATRIAL FIBRILLATION) (HCC): ICD-10-CM

## 2021-01-20 DIAGNOSIS — I25.10 CORONARY ARTERY DISEASE INVOLVING NATIVE CORONARY ARTERY OF NATIVE HEART WITHOUT ANGINA PECTORIS: ICD-10-CM

## 2021-01-20 PROCEDURE — 1036F TOBACCO NON-USER: CPT | Performed by: NURSE PRACTITIONER

## 2021-01-20 PROCEDURE — 99214 OFFICE O/P EST MOD 30 MIN: CPT | Performed by: NURSE PRACTITIONER

## 2021-01-20 PROCEDURE — G8482 FLU IMMUNIZE ORDER/ADMIN: HCPCS | Performed by: NURSE PRACTITIONER

## 2021-01-20 PROCEDURE — G8427 DOCREV CUR MEDS BY ELIG CLIN: HCPCS | Performed by: NURSE PRACTITIONER

## 2021-01-20 PROCEDURE — 3017F COLORECTAL CA SCREEN DOC REV: CPT | Performed by: NURSE PRACTITIONER

## 2021-01-20 PROCEDURE — G8417 CALC BMI ABV UP PARAM F/U: HCPCS | Performed by: NURSE PRACTITIONER

## 2021-01-20 RX ORDER — METOPROLOL SUCCINATE 100 MG/1
100 TABLET, EXTENDED RELEASE ORAL 2 TIMES DAILY
Qty: 180 TABLET | Refills: 3 | Status: SHIPPED | OUTPATIENT
Start: 2021-01-20 | End: 2021-08-02

## 2021-01-20 NOTE — PROGRESS NOTES
[address]   Cardiology Note              Date:  January 20, 2021  Patientname: Jacquelyn Bishop  YOB: 1960    Primary Care physician: Honey Moore DO    HISTORY OF PRESENT ILLNESS: Jacquelyn Bishop is a 56 y.o. male with a history of PAF, AT, HTN, HLD, DM, TIA. He was found to have AF on cardiac monitor in 2016. He was admitted 6/2020 for extremity weakness. Found to have right MCA stenosis (medical management) and treated for TIA. Eliquis ultimately changed to xarelto due to TIA on eliquis. He was admitted to [facility] 9/2020 for febrile illness/tachycardia. Treated for SVT requiring adenosine, possible AVNRT vs AT. Stress test abnormal. [facility] 9/16/2020 showed nonobstructive CAD. Echo showed EF 60-65%. Flecainide started at discharge. At office visit 10/1/2020, flecainide stopped due to fatigue. On 10/16/2020 he had ILR placed. Today he presents for hospital follow up s/p ILR. He has some shortness of breath he attributes to asthma but is overall improving. He has had dizziness since TIA and uses a cane. He has no chest pain. Home BP 130s/70s. Past Medical History:   has a past medical history of A-fib ([address] Allergy to environmental factors, Asthma, Diabetes mellitus ([facility] [address] HTN (hypertension), Mixed hyperlipidemia, and TIA (transient ischemic attack). Past Surgical History:   has a past surgical history that includes External ear surgery; Foot surgery; Tonsillectomy; Adenoidectomy; and Insertable Cardiac Monitor (Left, 10/16/2020). Home Medications:    Prior to Admission medications    Medication Sig Start Date End Date Taking?  Authorizing Provider   rivaroxaban (XARELTO) 20 MG TABS tablet Take 1 tablet by mouth daily (with breakfast) 1/11/21   ASPEN Sanon MD   fluticasone-salmeterol (ADVAIR) 250-50 MCG/DOSE AEPB Inhale 1 puff into the lungs every 12 hours 1/5/21   Honey Moore DO metFORMIN (GLUCOPHAGE) 500 MG tablet Take 2 tablets by mouth 2 times daily (with meals) 1/5/21   Marlyn Stark DO   atorvastatin (LIPITOR) 40 MG tablet TAKE 1 TABLET BY MOUTH DAILY AT BEDTIME 1/5/21   Marlyn Stark DO   lisinopril (PRINIVIL;ZESTRIL) 10 MG tablet TAKE ONE TABLET BY MOUTH DAILY 1/5/21   Marlyn Stark DO   methocarbamol (ROBAXIN) 500 MG tablet Take 1 tablet by mouth 3 times daily as needed (pain) 1/5/21   Marlyn Stark DO   glipiZIDE (GLUCOTROL) 10 MG tablet Take 1 tablet by mouth 2 times daily (before meals) 1/5/21   Marlyn Stark DO   metoprolol succinate (TOPROL XL) 100 MG extended release tablet Take 1 tablet by mouth 2 times daily Take 100mg in the morning and 50 mg at night 10/15/20   J Sherrine Klinefelter., MD   fluticasone-vilanterol (BREO ELLIPTA) 100-25 MCG/INH AEPB inhaler Inhale 1 puff into the lungs daily 6/8/20   Marlyn Stark DO   fluticasone (FLONASE) 50 MCG/ACT nasal spray 2 sprays by Each Nostril route daily  Patient taking differently: 2 sprays by Each Nostril route daily as needed  3/4/20   Ar Chase DO   aspirin 81 MG tablet Take 81 mg by mouth daily    Historical Provider, MD   albuterol sulfate (PROAIR RESPICLICK) 506 (90 Base) MCG/ACT aerosol powder inhalation INHALE 2 PUFFS EVERY 6 HOURS AS NEEDED FOR WHEEZING/SHORTNESS OF BREATH 12/2/19   Marlyn Stark DO   vitamin D (CHOLECALCIFEROL) 1000 UNIT TABS tablet Take 2,000 Units by mouth 2 times daily     Historical Provider, MD   glucose blood VI test strips (ASCENSIA AUTODISC VI;ONE TOUCH ULTRA TEST VI) strip Test blood sugars twice daily 2/26/16   Marlyn Stark DO   [facility] daily.  Lancets approved by insurance company and patient choice 1/22/16   Marlyn Stark DO     Allergies:  Penicillins and Codeine Social History:   reports that he quit smoking about 5 years ago. His smoking use included cigars and cigarettes. He has a 15.00 pack-year smoking history. He has never used smokeless tobacco. He reports current alcohol use. He reports that he does not use drugs. Family History: family history includes Asthma in his brother; Cancer in his father; Diabetes in his maternal grandmother; Heart Disease in his maternal uncle and mother. OBJECTIVE:    Vital signs:    /80   Pulse 95   Ht 6' 2\" (1.88 m)   Wt 279 lb (126.6 kg)   SpO2 97%   BMI 35.82 kg/m²      Physical Exam:  Constitutional:  Comfortable and alert, NAD, appears stated age  Eyes: PERRL, sclera nonicteric  Neck:  Supple, no masses, no thyroidmegaly, no JVD  Skin:  Warm and dry; no rash or lesions  Heart:  Regular, normal apex, S1 and S2 normal, no M/G/R  Lungs:  Normal respiratory effort; clear; no wheezing/rhonchi/rales  Abdomen: soft, non tender, + bowel sounds  Extremities:  No edema or cyanosis; no clubbing  Neuro: alert and oriented, moves legs and arms equally, normal mood and affect    Data Reviewed:      Echo 9/15/2020:   Left ventricle: Not well visualized. Systolic function was normal. The estimated ejection fraction    was in the range of 60% to 65%. Images were inadequate for LV wall motion assessment. Left    ventricular diastolic function parameters were normal.  - Right ventricle: Systolic function was normal. TAPSE: 2.9cm. Coronary angiogram 9/16/2020:  - Left heart catheterization.  - Left coronary angiography.  - Right coronary angiography.    -------------------------------------------------------------------  IMPRESSIONS:  Mild luminal irregularities and non obstructive CAD. False positive stress test.  Elevated LVEDP of 18 mmHg. RECOMMENDATIONS:  Life style and risk factor modification.   ASA and statin if no contraindications.    ------------------------------------------------------------------- INDICATIONS:   Arrhythmia.  Chest Pain.  Ischemia.  A fib (I48.0). HISTORY:   Risk factors:  Hypertension. Diabetes mellitus. Dyslipidemia.    -------------------------------------------------------------------  PROCEDURE IN DETAIL:   Consent:  The risks, benefits, and  alternatives to the procedure and sedation were explained to the  patient and informed consent was obtained.     Location:  Catheterization laboratory. PROCEDURE:    1. Initial setup. The patient was brought to the laboratory in a fasting state. Surface ECG leads,     blood pressure measurements, and pulse oximetric signals were monitored. 2. Skin preparation. The planned puncture sites were prepped and draped in the usual sterile manner. 3. Right radial artery access. A [id number] glidesheath - slender - .021 sheath was advanced into the     vessel. 4. Left heart catheterization. 5. Selective left coronary angiography. A [id number] tig 4.0 catheter was introduced. Contrast was     injected. Images were obtained using multiple projections. 6. Selective right coronary angiography. A [id number] tig 4.0 catheter was introduced. Contrast was     injected. Images were obtained using multiple projections. 7. Right radial artery hemostasis. The sheath was removed. Vessel closure was achieved with a lrg tr     band l device. 8. Sedation. The procedure was performed using moderate (conscious) sedation under my personal     supervision. A trained, dedicated, and qualified observer monitored the patient. The following     parameters were monitored: oxygen saturation, heart rate, blood pressure, respiratory rate,     adequacy of pulmonary ventilation, and response to care. Sedation and monitoring were provided     for greater than 15 minutes. STUDY COMPLETION:  The patient tolerated the procedure well. There  were no complications.  Contrast:   Omnipaque 350 50ml (total  dose).  Omnipaque 350 50ml (wasted). -------------------------------------------------------------------  CORONARY ARTERIES:   The coronary circulation is left dominant. Left main:  Normal.  LAD:  Minor luminal irregularities.  Minor luminal irregularities. 1st diagonal:  Minor luminal irregularities. Left circumflex:  Minor luminal irregularities. 1st obtuse marginal:  Proximal vessel lesion: There is a 25%  stenosis. 2nd obtuse marginal:  Minor luminal irregularities. LCx posterolateral extension: Lesion: There is a 30% stenosis. Right coronary:  Normal.    Cardiology Labs Reviewed:   CBC: No results for input(s): WBC, HGB, HCT, PLT in the last 72 hours. BMP:No results for input(s): NA, K, CO2, BUN, CREATININE, LABGLOM, GLUCOSE in the last 72 hours. PT/INR: No results for input(s): PROTIME, INR in the last 72 hours. APTT:No results for input(s): APTT in the last 72 hours. FASTING LIPID PANEL:  Lab Results   Component Value Date    HDL 36 06/09/2020    LDLCALC 89 06/09/2020    TRIG 75 06/09/2020     LIVER PROFILE:No results for input(s): AST, ALT, ALB in the last 72 hours. BNP: No results found for: PROBNP    Reviewed all labs and imaging today    Assessment:   S/p ILR 10/16/2020   - interrogations negative for AF thus far  CAD: stable, no angina; nonobstructive on angiogram 9/2020  Paroxysmal atrial arrhthymias (afib and AT): stable   - [id number] score 4 (TIA, HTN, DM)  on xarelto    - monitoring per ILR   - flecainide stopped 10/2020 due to fatigue  HTN: controlled  HLD: stable, LDL 89, statin  R MCA stenosis: noted on CTA 6/2020, medical management  TIA: noted 6/2020  DM: hgb a1c 7.4    Plan:   1. Continue aspirin, statin, toprol, lisinopril, xarelto  2. Continue ILR transmissions, consider antiarrythmic therapy and/or ablation if recurrent arrythmias  3. Stay active along with a healthy diet  4. Check BP at home and call the office if consistently out of goal range  5.  Follow up in 6 months with Dr. Rutha Claude, APRN-CNP

## 2021-01-20 NOTE — LETTER
[address]   Cardiology Note              Date:  January 20, 2021  Patientname: Jacquelyn Bishop  YOB: 1960    Primary Care physician: Honey Moore DO    HISTORY OF PRESENT ILLNESS: Jacquelyn Bishop is a 56 y.o. male with a history of PAF, AT, HTN, HLD, DM, TIA. He was found to have AF on cardiac monitor in 2016. He was admitted 6/2020 for extremity weakness. Found to have right MCA stenosis (medical management) and treated for TIA. Eliquis ultimately changed to xarelto due to TIA on eliquis. He was admitted to [facility] 9/2020 for febrile illness/tachycardia. Treated for SVT requiring adenosine, possible AVNRT vs AT. Stress test abnormal. [facility] 9/16/2020 showed nonobstructive CAD. Echo showed EF 60-65%. Flecainide started at discharge. At office visit 10/1/2020, flecainide stopped due to fatigue. On 10/16/2020 he had ILR placed. Today he presents for hospital follow up s/p ILR. He has some shortness of breath he attributes to asthma but is overall improving. He has had dizziness since TIA and uses a cane. He has no chest pain. Home BP 130s/70s. Past Medical History:   has a past medical history of A-fib ([address] Allergy to environmental factors, Asthma, Diabetes mellitus ([facility] [address] HTN (hypertension), Mixed hyperlipidemia, and TIA (transient ischemic attack). Past Surgical History:   has a past surgical history that includes External ear surgery; Foot surgery; Tonsillectomy; Adenoidectomy; and Insertable Cardiac Monitor (Left, 10/16/2020). Home Medications:    Prior to Admission medications    Medication Sig Start Date End Date Taking?  Authorizing Provider   rivaroxaban (XARELTO) 20 MG TABS tablet Take 1 tablet by mouth daily (with breakfast) 1/11/21   ASPEN Sanon MD   fluticasone-salmeterol (ADVAIR) 250-50 MCG/DOSE AEPB Inhale 1 puff into the lungs every 12 hours 1/5/21   Honey Moore DO metFORMIN (GLUCOPHAGE) 500 MG tablet Take 2 tablets by mouth 2 times daily (with meals) 1/5/21   Marlyn Stark DO   atorvastatin (LIPITOR) 40 MG tablet TAKE 1 TABLET BY MOUTH DAILY AT BEDTIME 1/5/21   Marlyn Stark DO   lisinopril (PRINIVIL;ZESTRIL) 10 MG tablet TAKE ONE TABLET BY MOUTH DAILY 1/5/21   Marlyn Stark DO   methocarbamol (ROBAXIN) 500 MG tablet Take 1 tablet by mouth 3 times daily as needed (pain) 1/5/21   Marlyn Stark DO   glipiZIDE (GLUCOTROL) 10 MG tablet Take 1 tablet by mouth 2 times daily (before meals) 1/5/21   Marlyn Stark DO   metoprolol succinate (TOPROL XL) 100 MG extended release tablet Take 1 tablet by mouth 2 times daily Take 100mg in the morning and 50 mg at night 10/15/20   ASPEN Loya MD   fluticasone-vilanterol (BREO ELLIPTA) 100-25 MCG/INH AEPB inhaler Inhale 1 puff into the lungs daily 6/8/20   Marlyn Stark DO   fluticasone (FLONASE) 50 MCG/ACT nasal spray 2 sprays by Each Nostril route daily  Patient taking differently: 2 sprays by Each Nostril route daily as needed  3/4/20   Ar Chase DO   aspirin 81 MG tablet Take 81 mg by mouth daily    Historical Provider, MD   albuterol sulfate (PROAIR RESPICLICK) 840 (90 Base) MCG/ACT aerosol powder inhalation INHALE 2 PUFFS EVERY 6 HOURS AS NEEDED FOR WHEEZING/SHORTNESS OF BREATH 12/2/19   Marlyn Stark DO   vitamin D (CHOLECALCIFEROL) 1000 UNIT TABS tablet Take 2,000 Units by mouth 2 times daily     Historical Provider, MD   glucose blood VI test strips (ASCENSIA AUTODISC VI;ONE TOUCH ULTRA TEST VI) strip Test blood sugars twice daily 2/26/16   Marlyn Stark DO   [facility] twice daily.  Lancets approved by insurance company and patient choice 1/22/16   Marlyn Stark DO     Allergies:  Penicillins and Codeine Social History:   reports that he quit smoking about 5 years ago. His smoking use included cigars and cigarettes. He has a 15.00 pack-year smoking history. He has never used smokeless tobacco. He reports current alcohol use. He reports that he does not use drugs. Family History: family history includes Asthma in his brother; Cancer in his father; Diabetes in his maternal grandmother; Heart Disease in his maternal uncle and mother. OBJECTIVE:    Vital signs:    /80   Pulse 95   Ht 6' 2\" (1.88 m)   Wt 279 lb (126.6 kg)   SpO2 97%   BMI 35.82 kg/m²      Physical Exam:  Constitutional:  Comfortable and alert, NAD, appears stated age  Eyes: PERRL, sclera nonicteric  Neck:  Supple, no masses, no thyroidmegaly, no JVD  Skin:  Warm and dry; no rash or lesions  Heart:  Regular, normal apex, S1 and S2 normal, no M/G/R  Lungs:  Normal respiratory effort; clear; no wheezing/rhonchi/rales  Abdomen: soft, non tender, + bowel sounds  Extremities:  No edema or cyanosis; no clubbing  Neuro: alert and oriented, moves legs and arms equally, normal mood and affect    Data Reviewed:      Echo 9/15/2020:   Left ventricle: Not well visualized. Systolic function was normal. The estimated ejection fraction    was in the range of 60% to 65%. Images were inadequate for LV wall motion assessment. Left    ventricular diastolic function parameters were normal.  - Right ventricle: Systolic function was normal. TAPSE: 2.9cm. Coronary angiogram 9/16/2020:  - Left heart catheterization.  - Left coronary angiography.  - Right coronary angiography.    -------------------------------------------------------------------  IMPRESSIONS:  Mild luminal irregularities and non obstructive CAD. False positive stress test.  Elevated LVEDP of 18 mmHg. RECOMMENDATIONS:  Life style and risk factor modification.   ASA and statin if no contraindications.    ------------------------------------------------------------------- INDICATIONS:   Arrhythmia.  Chest Pain.  Ischemia.  A fib (I48.0). HISTORY:   Risk factors:  Hypertension. Diabetes mellitus. Dyslipidemia.    -------------------------------------------------------------------  PROCEDURE IN DETAIL:   Consent:  The risks, benefits, and  alternatives to the procedure and sedation were explained to the  patient and informed consent was obtained.     Location:  Catheterization laboratory. PROCEDURE:    1. Initial setup. The patient was brought to the laboratory in a fasting state. Surface ECG leads,     blood pressure measurements, and pulse oximetric signals were monitored. 2. Skin preparation. The planned puncture sites were prepped and draped in the usual sterile manner. 3. Right radial artery access. A [id number] glidesheath - slender - .021 sheath was advanced into the     vessel. 4. Left heart catheterization. 5. Selective left coronary angiography. A [id number] tig 4.0 catheter was introduced. Contrast was     injected. Images were obtained using multiple projections. 6. Selective right coronary angiography. A [id number] tig 4.0 catheter was introduced. Contrast was     injected. Images were obtained using multiple projections. 7. Right radial artery hemostasis. The sheath was removed. Vessel closure was achieved with a lrg tr     band l device. 8. Sedation. The procedure was performed using moderate (conscious) sedation under my personal     supervision. A trained, dedicated, and qualified observer monitored the patient. The following     parameters were monitored: oxygen saturation, heart rate, blood pressure, respiratory rate,     adequacy of pulmonary ventilation, and response to care. Sedation and monitoring were provided     for greater than 15 minutes. STUDY COMPLETION:  The patient tolerated the procedure well. There  were no complications.  Contrast:   Omnipaque 350 50ml (total  dose).  Omnipaque 350 50ml (wasted). -------------------------------------------------------------------  CORONARY ARTERIES:   The coronary circulation is left dominant. Left main:  Normal.  LAD:  Minor luminal irregularities.  Minor luminal irregularities. 1st diagonal:  Minor luminal irregularities. Left circumflex:  Minor luminal irregularities. 1st obtuse marginal:  Proximal vessel lesion: There is a 25%  stenosis. 2nd obtuse marginal:  Minor luminal irregularities. LCx posterolateral extension: Lesion: There is a 30% stenosis. Right coronary:  Normal.    Cardiology Labs Reviewed:   CBC: No results for input(s): WBC, HGB, HCT, PLT in the last 72 hours. BMP:No results for input(s): NA, K, CO2, BUN, CREATININE, LABGLOM, GLUCOSE in the last 72 hours. PT/INR: No results for input(s): PROTIME, INR in the last 72 hours. APTT:No results for input(s): APTT in the last 72 hours. FASTING LIPID PANEL:  Lab Results   Component Value Date    HDL 36 06/09/2020    LDLCALC 89 06/09/2020    TRIG 75 06/09/2020     LIVER PROFILE:No results for input(s): AST, ALT, ALB in the last 72 hours. BNP: No results found for: PROBNP    Reviewed all labs and imaging today    Assessment:   S/p ILR 10/16/2020   - interrogations negative for AF thus far  CAD: stable, no angina; nonobstructive on angiogram 9/2020  Paroxysmal atrial arrhthymias (afib and AT): stable   - [id number] score 4 (TIA, HTN, DM)  on xarelto    - monitoring per ILR   - flecainide stopped 10/2020 due to fatigue  HTN: controlled  HLD: stable, LDL 89, statin  R MCA stenosis: noted on CTA 6/2020, medical management  TIA: noted 6/2020  DM: hgb a1c 7.4    Plan:   1. Continue aspirin, statin, toprol, lisinopril, xarelto  2. Continue ILR transmissions, consider antiarrythmic therapy and/or ablation if recurrent arrythmias  3. Stay active along with a healthy diet  4. Check BP at home and call the office if consistently out of goal range  5.  Follow up in 6 months with DEMETRI Kaplan-CNP [facility]  [phone]

## 2021-02-09 NOTE — PROGRESS NOTES
Remote interrogation of implanted cardiac event monitor shows normal function. Patient has a history of TIA, AT,and pAF. Takes Toprol XL and Xarelto. Patient's last device interrogation was on 1/7. Since last interrogation, 6 tachy events recorded. V rates for these events range 125-150 bpm. Longest event recorded on 2/7 x 3 minutes. Events #98, #102, and #103 appear to be pSVT. All other EGMs appear to show artifact. CARTER JEAN-BAPTISTE to review. See [employer] report under the Cardiology tab. Follow up 1 month via [employer].

## 2021-02-10 ENCOUNTER — NURSE ONLY (OUTPATIENT)
Dept: CARDIOLOGY CLINIC | Age: 61
End: 2021-02-10
Payer: COMMERCIAL

## 2021-02-10 DIAGNOSIS — I48.0 PAF (PAROXYSMAL ATRIAL FIBRILLATION) (HCC): ICD-10-CM

## 2021-02-10 DIAGNOSIS — I47.9 PAROXYSMAL TACHYCARDIA (HCC): ICD-10-CM

## 2021-02-10 DIAGNOSIS — Z45.09 ENCOUNTER FOR LOOP RECORDER CHECK: ICD-10-CM

## 2021-02-10 DIAGNOSIS — G45.1 TIA INVOLVING RIGHT INTERNAL CAROTID ARTERY: ICD-10-CM

## 2021-02-10 PROCEDURE — 93298 REM INTERROG DEV EVAL SCRMS: CPT | Performed by: INTERNAL MEDICINE

## 2021-02-10 PROCEDURE — G2066 INTER DEVC REMOTE 30D: HCPCS | Performed by: INTERNAL MEDICINE

## 2021-03-17 ENCOUNTER — NURSE ONLY (OUTPATIENT)
Dept: CARDIOLOGY CLINIC | Age: 61
End: 2021-03-17
Payer: COMMERCIAL

## 2021-03-17 DIAGNOSIS — G45.1 TIA INVOLVING RIGHT INTERNAL CAROTID ARTERY: ICD-10-CM

## 2021-03-17 DIAGNOSIS — I48.0 PAF (PAROXYSMAL ATRIAL FIBRILLATION) (HCC): ICD-10-CM

## 2021-03-17 DIAGNOSIS — Z45.09 ENCOUNTER FOR LOOP RECORDER CHECK: ICD-10-CM

## 2021-03-17 DIAGNOSIS — E11.9 TYPE 2 DIABETES MELLITUS WITHOUT COMPLICATION, WITHOUT LONG-TERM CURRENT USE OF INSULIN (HCC): ICD-10-CM

## 2021-03-17 NOTE — TELEPHONE ENCOUNTER
Last office visit 1/5/2021     Last written 1-5-2021 x 5 refills  Pt.  Changing pharmacies    Next office visit scheduled 7/6/2021    Requested Prescriptions     Pending Prescriptions Disp Refills    glipiZIDE (GLUCOTROL) 10 MG tablet 60 tablet 5     Sig: Take 1 tablet by mouth 2 times daily (before meals)

## 2021-03-18 RX ORDER — GLIPIZIDE 10 MG/1
10 TABLET ORAL
Qty: 60 TABLET | Refills: 5 | Status: SHIPPED | OUTPATIENT
Start: 2021-03-18 | End: 2021-10-11

## 2021-03-18 NOTE — PROGRESS NOTES
Remote interrogation of implanted cardiac event monitor shows normal function. ILR to monitor for AF. Hx pAF, AT, TIA. (oac, toprol xl). No AFib recorded to date. Average v rates 80-90 bpm.  Tachycardia recorded, appears to be ST. Dates recorded from 3/4-3/8 and times are grouped together (possibly exercising). Rates 120-150 bpm and longest 29 min. Average v rates 80-90 bpm.  Follow up 1 month via [employer]link.

## 2021-03-19 PROCEDURE — G2066 INTER DEVC REMOTE 30D: HCPCS | Performed by: INTERNAL MEDICINE

## 2021-03-19 PROCEDURE — 93298 REM INTERROG DEV EVAL SCRMS: CPT | Performed by: INTERNAL MEDICINE

## 2021-03-24 DIAGNOSIS — E11.9 TYPE 2 DIABETES MELLITUS WITHOUT COMPLICATION, WITHOUT LONG-TERM CURRENT USE OF INSULIN (HCC): ICD-10-CM

## 2021-03-24 NOTE — TELEPHONE ENCOUNTER
Refill Request     Last Seen: 1/5/2021    Last Written: 1/5/2021    Next Appointment:   Future Appointments   Date Time Provider Carmita Donahue   4/21/2021  8:20 AM SCHEDULE, Catrina [facility] Edgar Coulter [facility]   5/26/2021  8:20 AM [facility]EDU[facility]   7/6/2021  9:45 AM DO GIA Segovia [facility]   7/20/2021 10:45 AM SCHEDULE, [facility]   7/20/2021 10:45 AM ASPEN Kearney MD [facility]       Future appointment scheduled      Requested Prescriptions     Pending Prescriptions Disp Refills    metFORMIN (GLUCOPHAGE) 500 MG tablet [Pharmacy Med Name: METFORMIN  MG TABLET] 120 tablet 4     Sig: TAKE 2 TABLETS BY MOUTH TWICE A DAY WITH MEALS

## 2021-04-21 ENCOUNTER — NURSE ONLY (OUTPATIENT)
Dept: CARDIOLOGY CLINIC | Age: 61
End: 2021-04-21
Payer: COMMERCIAL

## 2021-04-21 DIAGNOSIS — I48.0 PAF (PAROXYSMAL ATRIAL FIBRILLATION) (HCC): ICD-10-CM

## 2021-04-21 DIAGNOSIS — G45.1 TIA INVOLVING RIGHT INTERNAL CAROTID ARTERY: ICD-10-CM

## 2021-04-21 DIAGNOSIS — Z45.09 ENCOUNTER FOR LOOP RECORDER CHECK: ICD-10-CM

## 2021-04-22 NOTE — PROGRESS NOTES
Remote interrogation of implanted cardiac event monitor shows normal function. ILR to monitor for AF. Hx pAF, AT, TIA. (oac, toprol xl). No AFib recorded to date. Tachy 04-Apr-2021 20:02-ST x 2 min. No symptom episodes recorded. Follow up 1 month via [employer].

## 2021-04-26 PROCEDURE — G2066 INTER DEVC REMOTE 30D: HCPCS | Performed by: INTERNAL MEDICINE

## 2021-04-26 PROCEDURE — 93298 REM INTERROG DEV EVAL SCRMS: CPT | Performed by: INTERNAL MEDICINE

## 2021-05-25 DIAGNOSIS — E78.2 MIXED HYPERLIPIDEMIA: ICD-10-CM

## 2021-05-25 RX ORDER — ATORVASTATIN CALCIUM 40 MG/1
TABLET, FILM COATED ORAL
Qty: 90 TABLET | Refills: 1 | Status: SHIPPED | OUTPATIENT
Start: 2021-05-25 | End: 2021-09-10

## 2021-05-25 NOTE — TELEPHONE ENCOUNTER
Refill Request     Last Seen: Last Seen Department: 1/5/2021  Last Seen by PCP: 1/5/2021    Last Written: 1/5/2021    Next Appointment:   Future Appointments   Date Time Provider Carmita Donahue   5/26/2021  8:20 AM SCHEDULE, [employer] Pratik REMOTE Scott Splinter [facility]   6/30/2021  7:05 AM SCHEDULE, Emogene Pratik REMOTE TRANSMISSION Mane Westbrook [facility]   7/8/2021 10:45 AM DO GIA Segovia  [place] - [facility]   7/20/2021 10:45 AM SCHEDULE, [facility] Mane Westbrook [facility]   7/20/2021 10:45 AM MD Mane Odom [facility]       Future appointment scheduled      Requested Prescriptions     Pending Prescriptions Disp Refills    atorvastatin (LIPITOR) 40 MG tablet [Pharmacy Med Name: ATORVASTATIN 40 MG TABLET] 90 tablet 1     Sig: TAKE 1 TABLET BY MOUTH EVERYDAY AT BEDTIME

## 2021-05-26 ENCOUNTER — NURSE ONLY (OUTPATIENT)
Dept: CARDIOLOGY CLINIC | Age: 61
End: 2021-05-26
Payer: COMMERCIAL

## 2021-05-26 DIAGNOSIS — I48.0 PAF (PAROXYSMAL ATRIAL FIBRILLATION) (HCC): ICD-10-CM

## 2021-05-26 DIAGNOSIS — I47.9 PAROXYSMAL TACHYCARDIA (HCC): ICD-10-CM

## 2021-05-26 DIAGNOSIS — Z45.09 ENCOUNTER FOR LOOP RECORDER CHECK: ICD-10-CM

## 2021-05-26 DIAGNOSIS — G45.1 TIA INVOLVING RIGHT INTERNAL CAROTID ARTERY: ICD-10-CM

## 2021-05-26 NOTE — PROGRESS NOTES
Remote interrogation of implanted cardiac event monitor shows normal function. ILR to monitor for AF. Hx pAF, AT, TIA. (oac, toprol xl). No AFib recorded to date. Average v rates  bpm.  Parameter Settings  Arrhythmia Detection: Tachy  Duration: 16 beats  Rate (Interval): 120 bpm (500 ms)  Tachycardia recorded-SVT and brief PAT. No symptom activated events. Follow up 1 month via carelink.

## 2021-06-01 PROCEDURE — G2066 INTER DEVC REMOTE 30D: HCPCS | Performed by: INTERNAL MEDICINE

## 2021-06-01 PROCEDURE — 93298 REM INTERROG DEV EVAL SCRMS: CPT | Performed by: INTERNAL MEDICINE

## 2021-06-02 NOTE — TELEPHONE ENCOUNTER
Pt had TIA and was admitted to [facility] 6/8/20-6/10/20. Pt's hospital doc and PCP wants pt to f/u with [address] asap. [address] would you like to see pt before August? Pt's last OV 3/31/20 [address]. [address] please advise. Pt can be reached at [phone].       TY pain/decreased ROM/decreased strength

## 2021-06-30 ENCOUNTER — NURSE ONLY (OUTPATIENT)
Dept: CARDIOLOGY CLINIC | Age: 61
End: 2021-06-30
Payer: COMMERCIAL

## 2021-06-30 DIAGNOSIS — Z45.09 ENCOUNTER FOR LOOP RECORDER CHECK: ICD-10-CM

## 2021-06-30 DIAGNOSIS — I48.0 PAF (PAROXYSMAL ATRIAL FIBRILLATION) (HCC): ICD-10-CM

## 2021-06-30 DIAGNOSIS — G45.1 TIA INVOLVING RIGHT INTERNAL CAROTID ARTERY: ICD-10-CM

## 2021-06-30 DIAGNOSIS — I47.9 PAROXYSMAL TACHYCARDIA (HCC): ICD-10-CM

## 2021-07-02 NOTE — PROGRESS NOTES
Remote transmission received for patients ILR. EP physician will review. See interrogation under the cardiology tab in the [address] [facility] [address] field for more details. Will continue to monitor remotely. ILR to monitor for AF. Hx pAF, AT, TIA. (oac, toprol xl). No AFib recorded to date. Average v rates  bpm.  No symptom activated events.   Tachycardia/ST recorded, rates 120-130bpm.

## 2021-07-06 PROCEDURE — 93298 REM INTERROG DEV EVAL SCRMS: CPT | Performed by: INTERNAL MEDICINE

## 2021-07-06 PROCEDURE — G2066 INTER DEVC REMOTE 30D: HCPCS | Performed by: INTERNAL MEDICINE

## 2021-07-08 ENCOUNTER — OFFICE VISIT (OUTPATIENT)
Dept: FAMILY MEDICINE CLINIC | Age: 61
End: 2021-07-08
Payer: COMMERCIAL

## 2021-07-08 VITALS
SYSTOLIC BLOOD PRESSURE: 140 MMHG | DIASTOLIC BLOOD PRESSURE: 70 MMHG | HEART RATE: 100 BPM | OXYGEN SATURATION: 97 % | BODY MASS INDEX: 36.46 KG/M2 | WEIGHT: 284 LBS

## 2021-07-08 DIAGNOSIS — E78.2 MIXED HYPERLIPIDEMIA: ICD-10-CM

## 2021-07-08 DIAGNOSIS — E11.9 TYPE 2 DIABETES MELLITUS WITHOUT COMPLICATION, WITHOUT LONG-TERM CURRENT USE OF INSULIN (HCC): Primary | ICD-10-CM

## 2021-07-08 DIAGNOSIS — E11.8 TYPE 2 DIABETES MELLITUS WITH UNSPECIFIED COMPLICATIONS (HCC): ICD-10-CM

## 2021-07-08 DIAGNOSIS — J45.901 MODERATE ASTHMA WITH EXACERBATION, UNSPECIFIED WHETHER PERSISTENT: ICD-10-CM

## 2021-07-08 DIAGNOSIS — I10 ESSENTIAL HYPERTENSION: ICD-10-CM

## 2021-07-08 LAB
CREATININE URINE POCT: 100
HBA1C MFR BLD: 8.3 %
MICROALBUMIN/CREAT 24H UR: 30 MG/G{CREAT}
MICROALBUMIN/CREAT UR-RTO: <30

## 2021-07-08 PROCEDURE — G8428 CUR MEDS NOT DOCUMENT: HCPCS | Performed by: FAMILY MEDICINE

## 2021-07-08 PROCEDURE — 3017F COLORECTAL CA SCREEN DOC REV: CPT | Performed by: FAMILY MEDICINE

## 2021-07-08 PROCEDURE — 82044 UR ALBUMIN SEMIQUANTITATIVE: CPT | Performed by: FAMILY MEDICINE

## 2021-07-08 PROCEDURE — 83036 HEMOGLOBIN GLYCOSYLATED A1C: CPT | Performed by: FAMILY MEDICINE

## 2021-07-08 PROCEDURE — G8417 CALC BMI ABV UP PARAM F/U: HCPCS | Performed by: FAMILY MEDICINE

## 2021-07-08 PROCEDURE — 99214 OFFICE O/P EST MOD 30 MIN: CPT | Performed by: FAMILY MEDICINE

## 2021-07-08 PROCEDURE — 2022F DILAT RTA XM EVC RTNOPTHY: CPT | Performed by: FAMILY MEDICINE

## 2021-07-08 PROCEDURE — 3052F HG A1C>EQUAL 8.0%<EQUAL 9.0%: CPT | Performed by: FAMILY MEDICINE

## 2021-07-08 PROCEDURE — 1036F TOBACCO NON-USER: CPT | Performed by: FAMILY MEDICINE

## 2021-07-08 RX ORDER — DULAGLUTIDE 0.75 MG/.5ML
0.75 INJECTION, SOLUTION SUBCUTANEOUS WEEKLY
Qty: 4 PEN | Refills: 5 | Status: SHIPPED | OUTPATIENT
Start: 2021-07-08 | End: 2022-01-25

## 2021-07-08 NOTE — PROGRESS NOTES
Hina Nevarez is a 59 y.o. male    Chief Complaint   Patient presents with    Diabetes    Hypertension    Hyperlipidemia       HPI:    Diabetes  He presents for his follow-up diabetic visit. He has type 2 diabetes mellitus. His disease course has been stable. Pertinent negatives for diabetes include no polydipsia. Diabetic complications include heart disease. Risk factors for coronary artery disease include diabetes mellitus, hypertension, male sex and obesity. When asked about current treatments, none were reported. An ACE inhibitor/angiotensin II receptor blocker is not being taken. Hypertension  This is a chronic problem. The current episode started more than 1 year ago. The problem is unchanged. The problem is controlled. Risk factors for coronary artery disease include diabetes mellitus, male gender and obesity. Past treatments include ACE inhibitors and beta blockers. The current treatment provides significant improvement. There are no compliance problems. Hypertensive end-organ damage includes CAD/MI. There is no history of kidney disease. Hyperlipidemia  This is a chronic problem. The current episode started more than 1 year ago. The problem is controlled. Recent lipid tests were reviewed and are low. Exacerbating diseases include diabetes. Pertinent negatives include no myalgias. Current antihyperlipidemic treatment includes statins. The current treatment provides moderate improvement of lipids. Compliance problems include medication cost.  Risk factors for coronary artery disease include hypertension, male sex, obesity and diabetes mellitus. ROS:    Review of Systems   Endocrine: Negative for polydipsia. Musculoskeletal: Negative for myalgias.        BP (!) 140/70 (Site: Right Upper Arm, Position: Sitting, Cuff Size: Large Adult)   Pulse 100   Wt 284 lb (128.8 kg)   SpO2 97%   BMI 36.46 kg/m²     Physical Exam:    Physical Exam  Constitutional:       General: He is not in acute distress. Appearance: Normal appearance. He is obese. He is not ill-appearing or toxic-appearing. HENT:      Head: Normocephalic. Neurological:      Mental Status: He is alert. Psychiatric:         Mood and Affect: Mood normal.         Behavior: Behavior normal.         Thought Content:  Thought content normal.         Current Outpatient Medications   Medication Sig Dispense Refill    Dulaglutide (TRULICITY) 0.40 FH/0.8SX SOPN Inject 0.75 mg into the skin once a week 4 pen 5    WIXELA INHUB 250-50 MCG/DOSE AEPB INHALE 1 PUFF INTO THE LUNGS EVERY 12 HOURS 1 each 3    atorvastatin (LIPITOR) 40 MG tablet TAKE 1 TABLET BY MOUTH EVERYDAY AT BEDTIME 90 tablet 1    metFORMIN (GLUCOPHAGE) 500 MG tablet TAKE 2 TABLETS BY MOUTH TWICE A DAY WITH MEALS 360 tablet 1    glipiZIDE (GLUCOTROL) 10 MG tablet Take 1 tablet by mouth 2 times daily (before meals) 60 tablet 5    metoprolol succinate (TOPROL XL) 100 MG extended release tablet Take 1 tablet by mouth 2 times daily 180 tablet 3    rivaroxaban (XARELTO) 20 MG TABS tablet Take 1 tablet by mouth daily (with breakfast) 30 tablet 1    lisinopril (PRINIVIL;ZESTRIL) 10 MG tablet TAKE ONE TABLET BY MOUTH DAILY 90 tablet 1    methocarbamol (ROBAXIN) 500 MG tablet Take 1 tablet by mouth 3 times daily as needed (pain) 30 tablet 1    fluticasone-vilanterol (BREO ELLIPTA) 100-25 MCG/INH AEPB inhaler Inhale 1 puff into the lungs daily 30 each 5    fluticasone (FLONASE) 50 MCG/ACT nasal spray 2 sprays by Each Nostril route daily (Patient taking differently: 2 sprays by Each Nostril route daily as needed ) 3 Bottle 1    aspirin 81 MG tablet Take 81 mg by mouth daily      albuterol sulfate (PROAIR RESPICLICK) 653 (90 Base) MCG/ACT aerosol powder inhalation INHALE 2 PUFFS EVERY 6 HOURS AS NEEDED FOR WHEEZING/SHORTNESS OF BREATH 1 Inhaler 11    vitamin D (CHOLECALCIFEROL) 1000 UNIT TABS tablet Take 2,000 Units by mouth 2 times daily       glucose blood VI test strips (ASCENSIA AUTODISC VI;ONE TOUCH ULTRA TEST VI) strip Test blood sugars twice daily 100 each 99    [facility] [place] daily. Lancets approved by insurance company and patient choice 100 each 3     No current facility-administered medications for this visit. Assessment:    1. Type 2 diabetes mellitus without complication, without long-term current use of insulin ([address]    2. Essential hypertension    3. Mixed hyperlipidemia    4. Type 2 diabetes mellitus with unspecified complications ([address]    5. Moderate asthma with exacerbation, unspecified whether persistent        Plan:    1. Type 2 diabetes mellitus without complication, without long-term current use of insulin ([address]  Add on Trulicity. 8.3  - metFORMIN (GLUCOPHAGE) 500 MG tablet; Take 2 tablets by mouth 2 times daily (with meals)  Dispense: 120 tablet; Refill: 5  - glipiZIDE (GLUCOTROL) 5 MG tablet; Take 1 tablet by mouth 2 times daily  Dispense: 60 tablet; Refill: 3  - [facility] DIABETES FOOT EXAM    2. Essential hypertension  Stable. Continue current medications. - lisinopril (PRINIVIL;ZESTRIL) 10 MG tablet; TAKE ONE TABLET BY MOUTH DAILY  Dispense: 90 tablet; Refill: 1    3. Mixed hyperlipidemia  Stable. Continue current medications. - atorvastatin (LIPITOR) 40 MG tablet; TAKE 1 TABLET BY MOUTH DAILY AT BEDTIME  Dispense: 90 tablet; Refill: 1    Asthma stable. Discussed to watch pulse rises with albuterol use. Return in about 3 months (around 10/8/2021) for Diabetes, Hypertension, Hyperlipidemia.

## 2021-07-19 NOTE — PROGRESS NOTES
[address]   Electrophysiology Follow Up Note            Date:  July 20, 2021  Patient name: Judy Bertrand  YOB: 1960    Reason for Follow Up:  Hospital follow up: tachycardia and atrial fibrillation  Primary Care Physician:Marlyn Stark DO    HISTORY OF PRESENT ILLNESS: Judy Bertrand is a 59 y.o. male who initially presented for evaluation for paroxysmal atrial fibrillation. He has a PMH of DM, PAT, HLD, asthma and PAF. He recently moved back from [place]. He recently wore an event monitor and that showed episodes of afib. Dr. Meeta Parson increased metoprolol was increased on 2/20/2020 to 75 mg daily due to increased heart rates. He wore a 7 day cardiac event monitor from 3/10-3/17/2020 which demonstrated predominantly NSR with 1 episode of Atrial Tachycardia and no AF or AFL. His EKG on 3/31/2020 demonstrated NSR 90 bpm. He reported he vapes. His metoprolol was increased to 100mg in the AM and 50mg nightly on 3/31/20 for palpitation control. He was evaluated in the Emergency Department on 6/8/2020 for TIA. His CTA Head/Neck on 6/8/2020 demonstrated mod/severe stenosis of proximal M1 segment of right MCA. He was seen by Dr Sarah Gordon, neurology. He was d/c home in good condition and instructed to continue his home medications. His EKG on 7/7/20 demonstrates NSR hr of 86BPM. He reported he was still confused from his TIA on 6/8/20. He reported he is scheduled to see a neurologist on 7/16/20. He reported he did not get along with the neurologist in the hospital very well due to being very blunt. He reported he has a strong family history of strokes. He went to ED on 3/12/20 for tachycardia, HTN, and dizziness. He was discharged with no medication changed. His JENNIFER from 3/30/20 noted NSR, PAC's <1 %, PVC's <1%, 1 episode of AT and no afib or aflutter. On 10/1/2020 he presented for hospital follow up.   He went to  on 9/13/2020 for diarrhea, vomiting, fever, tachycardia and AF. Stress test was positive for ischemia. He underwent LHC on 9/16/2020 that demonstrated mild luminal irregularities and non obstructive CAD. False positive stress test.  Elevated LVEDP of 18 mmHg. Echo demonstrated an EF of 60-65%. He was started on Flecainide 50mg twice a day and his lisinopril was stopped. He stated that the Flecainide makes him fatigued. He stated that he never feels his heart racing. He did not feel it when he went to the ED. He feels flutters once in a while. He denied edema or increase in shortness of breath. He has asthma. He states that he has gained over 15 lb since the [place] pandemic. His sugar is not controlled at this time. EKG 10/1/2020 showed SR 87 bpm.     Interval History since last office visit: Patient underwent ILR implantation 10/16/2020. Today, 7/20/2021, ECG demonstrates SR (98). ILR interrogation demonstrated numerous tachy events (125-130) with the longest lasting 11 minutes on 7/13/21. All events appear to be ST. He reports that he is doing ok today. He reports that he is fatigued all of the time now. He reports that his HR gets high with moderate exertion such as bringing in garbage cans from the driveway. He reports a couple of episodes of feeling syncopal while watching TV. He reports that these symptoms started after his last hospitalization. He states that his HR on average stays between 80-90 bpm. He reports that he has constant ringing in his ears and that he follows with an ENT and has had multiple ear surgeries. He reports that he is taking his medications as prescribed and tolerates them well. Denies recent issues with bleeding or bruising. Patient denies current edema, chest pain. Past Medical History:   has a past medical history of A-fib ([address] Allergy to environmental factors, Asthma, Diabetes mellitus ([address] HTN (hypertension), Mixed hyperlipidemia, and TIA (transient ischemic attack).     Past Surgical History:   has a past surgical Inhale 1 puff into the lungs daily 6/8/20  Yes Marlyn Stark DO   fluticasone (FLONASE) 50 MCG/ACT nasal spray 2 sprays by Each Nostril route daily  Patient taking differently: 2 sprays by Each Nostril route daily as needed  3/4/20  Yes Ar Chase DO   aspirin 81 MG tablet Take 81 mg by mouth daily   Yes Historical Provider, MD   albuterol sulfate (PROAIR RESPICLICK) 589 (90 Base) MCG/ACT aerosol powder inhalation INHALE 2 PUFFS EVERY 6 HOURS AS NEEDED FOR WHEEZING/SHORTNESS OF BREATH 12/2/19  Yes Marlyn Stark DO   vitamin D (CHOLECALCIFEROL) 1000 UNIT TABS tablet Take 2,000 Units by mouth 2 times daily    Yes Historical Provider, MD   glucose blood VI test strips (ASCENSIA AUTODISC VI;ONE TOUCH ULTRA TEST VI) strip Test blood sugars twice daily 2/26/16  Yes Marlyn Stark DO   [facility] Check twice daily. Lancets approved by insurance company and patient choice 1/22/16  Yes Astrid Shi DO       REVIEW OF SYSTEMS:    All 14-point review of systems are completed and  pertinent positives are mentioned in the history of present illness. Other  systems are reviewed and are negative. Physical Examination:    /60   Pulse 100   Ht 6' 2\" (1.88 m)   Wt 282 lb (127.9 kg)   SpO2 96%   BMI 36.21 kg/m²      Constitutional and General Appearance:    alert, cooperative, no distress and appears stated age  [de-identified]:    PERRLA, no cervical lymphadenopathy. No masses palpable. Normal oral  mucosa  Respiratory:  · Normal excursion and expansion without use of accessory muscles  · Resp Auscultation: Normal breath sounds without dullness or wheezing  Cardiovascular:  · The apical impulse is not displaced  · Heart tones are crisp and normal. regular S1 and S2. Abdomen:  · No masses or tenderness  · Bowel sounds present  Extremities:  ·  No Cyanosis or Clubbing  ·  Lower extremity edema: No  · Skin: Warm and dry  Neurological:  · Alert and oriented.   · Moves all extremities well  · No abnormalities of mood, affect, memory, mentation, or behavior are noted    DATA:    ECG 7/20/21: Personally reviewed. ECHO: 9/15/2020   Left ventricle: Not well visualized. Systolic function was normal. The estimated ejection fraction    was in the range of 60% to 65%. Images were inadequate for LV wall motion assessment. Left    ventricular diastolic function parameters were normal.  - Right ventricle: Systolic function was normal. TAPSE: 2.9cm. ECHO: 8/04/2017  Summary   Normal systolic function with an estimated ejection fraction of 60%. Mild concentric left ventricular hypertrophy. No regional wall motion abnormalities are seen. Grade I diastolic dysfunction with normal filing pressure. [id number] Score for Atrial Fibrillation Stroke Risk   Risk   Factors  Component Value   C CHF No 0   H HTN Yes 1   A2 Age >= 76 No,  (62 y.o.) 0   D DM Yes 1   S2 Prior Stroke/TIA Yes 2   V Vascular Disease No 0   A Age 74-69 No,  (62 y.o.) 0   Sc Sex male 0    [id number]  Score  4   Score last updated 3/9/20 10:59 AM EDT    Click here for a link to the UpToDate guideline \"Atrial Fibrillation: Anticoagulation therapy to prevent embolization    Disclaimer: Risk Score calculation is dependent on accuracy of patient problem list and past encounter diagnosis. IMPRESSION:    1. Paroxysmal atrial [id number] Score 2 (DM and HTN)  03/10/2020  Patient is a 17-year-old male with a medical history significant for diabetes, asthma, hypertension, and paroxysmal atrial fibrillation who presents with recurrent episodes of palpitations and tachycardia with heart rates in the 120s to 130s. Patient was diagnosed with atrial fibrillation based on a monitor back in 2016 that showed a low burden of atrial fibrillation approximately 0.5%. Since then he has been on anticoagulation and rate control. He is compliant with all his medications. He denies history of stroke.   He was referred to me because of a continue on apixaban for now. We will move forward with a loop monitor.  - Plan for ILR.  - Restart apixaban. - Stop flecainide because of fatigue and side effects. Hopefully ILR will help us determine if need need antiarrhythmic strategy. - Increase metoprolol to 100 mg BID.  - Follow up after ILR.    7/20/2021  Patient presents for follow up. He is now having some fatigue, dizziness and presyncope. His heart rates have increased. I recently increased his metoprolol and am worried this may be the reason for his symptoms. As he is on blood thinners I will ask for some labs. If normal then I will change him to diltiazem or verapamil. I will also refer him to ENT given his history of inner ear issues and dizziness and ongoing ringing in his ears. - CBC, CMP, TSH/FT4, and Hemoglobin A1c. - Switch from metoprolol to diltiazem post labs. - Continue apixaban. - Refer to ENT for dizziness.  - Continue remote monitoring with ILR. RECOMMENDATIONS:  1. Blood work due to fatigue. We will adjust medications after results. 2. Increase activity level and exercise as tolerated. 3. Continue with remote device transmissions every month. 4. Follow up with us in 3 months. QUALITY MEASURES  1. Tobacco Cessation Counseling: NA  2. Retake of BP if >140/90:   Yes  3. Documentation to PCP/referring for new patient:  Sent to PCP at close of office visit  4. CAD patient on anti-platelet: No  5. CAD patient on STATIN therapy:  No  6. Patient with CHF and aFib on anticoagulation:  Yes     All questions and concerns were addressed to the patient/family. Alternatives to my treatment were discussed. Socorro Doyle RN, am scribing for and in the presence of Dr. Virgilio Paige. 07/20/21 11:00 AM   Williams James RN    The scribe's documentation has been prepared under my direction and personally reviewed by me in its entirety.  I confirm that the note above accurately reflects all work, physical examination, the discussion of treatments and procedures, and medical decision making performed by me. Afia Lozano MD personally performed the services described in this documentation as scribed by nurse in my presence, and is both accurate and complete. Electronically signed by Edel Shaver MD on 7/20/2021 at 1:18 PM     Dr. Kam Sellers MD  Electrophysiology  [facility]. [facility] [facility]. [address]. [place], [address]  Phone: [phone]  Fax: [phone]     NOTE: This report was transcribed using voice recognition software. Every effort was made to ensure accuracy, however, inadvertent computerized transcription errors may be present. The scribe's documentation has been prepared under my direction and personally reviewed by me in its entirety. I confirm that the note above accurately reflects all work, physical examination, the discussion of treatments and procedures, and medical decision making performed by me.

## 2021-07-20 ENCOUNTER — OFFICE VISIT (OUTPATIENT)
Dept: CARDIOLOGY CLINIC | Age: 61
End: 2021-07-20
Payer: COMMERCIAL

## 2021-07-20 ENCOUNTER — NURSE ONLY (OUTPATIENT)
Dept: CARDIOLOGY CLINIC | Age: 61
End: 2021-07-20

## 2021-07-20 VITALS
BODY MASS INDEX: 36.19 KG/M2 | DIASTOLIC BLOOD PRESSURE: 60 MMHG | OXYGEN SATURATION: 96 % | HEIGHT: 74 IN | WEIGHT: 282 LBS | HEART RATE: 100 BPM | SYSTOLIC BLOOD PRESSURE: 120 MMHG

## 2021-07-20 DIAGNOSIS — G45.1 TIA INVOLVING RIGHT INTERNAL CAROTID ARTERY: ICD-10-CM

## 2021-07-20 DIAGNOSIS — I47.9 PAROXYSMAL TACHYCARDIA (HCC): ICD-10-CM

## 2021-07-20 DIAGNOSIS — Z45.09 ENCOUNTER FOR LOOP RECORDER CHECK: ICD-10-CM

## 2021-07-20 DIAGNOSIS — I48.0 PAF (PAROXYSMAL ATRIAL FIBRILLATION) (HCC): ICD-10-CM

## 2021-07-20 DIAGNOSIS — H93.13 BILATERAL TINNITUS: Primary | ICD-10-CM

## 2021-07-20 DIAGNOSIS — Z79.899 MEDICATION MANAGEMENT: ICD-10-CM

## 2021-07-20 PROCEDURE — 93000 ELECTROCARDIOGRAM COMPLETE: CPT | Performed by: INTERNAL MEDICINE

## 2021-07-20 PROCEDURE — 1036F TOBACCO NON-USER: CPT | Performed by: INTERNAL MEDICINE

## 2021-07-20 PROCEDURE — G8417 CALC BMI ABV UP PARAM F/U: HCPCS | Performed by: INTERNAL MEDICINE

## 2021-07-20 PROCEDURE — 3017F COLORECTAL CA SCREEN DOC REV: CPT | Performed by: INTERNAL MEDICINE

## 2021-07-20 PROCEDURE — G8427 DOCREV CUR MEDS BY ELIG CLIN: HCPCS | Performed by: INTERNAL MEDICINE

## 2021-07-20 PROCEDURE — 99214 OFFICE O/P EST MOD 30 MIN: CPT | Performed by: INTERNAL MEDICINE

## 2021-07-20 NOTE — PROGRESS NOTES
Patient comes in for interrogation of their implanted loop recorder. Patient has a history of paroxysmal tachycardia, pAF, and TIA. Takes Toprol XL and Xarelto. Patient's last device interrogation was on 6/30. Since last interrogation, numerous tachy events recorded - events V rates range from 125-130 bpm- longest event x  11 minutes on 7/13. All events appear to be ST. Patient will see Dr. Aby Erickson today in office. See [employer] report under the Cardiology tab. We will follow the patient remotely.

## 2021-07-20 NOTE — LETTER
tachycardia and AF. Stress test was positive for ischemia. He underwent LHC on 9/16/2020 that demonstrated mild luminal irregularities and non obstructive CAD. False positive stress test.  Elevated LVEDP of 18 mmHg. Echo demonstrated an EF of 60-65%. He was started on Flecainide 50mg twice a day and his lisinopril was stopped. He stated that the Flecainide makes him fatigued. He stated that he never feels his heart racing. He did not feel it when he went to the ED. He feels flutters once in a while. He denied edema or increase in shortness of breath. He has asthma. He states that he has gained over 15 lb since the [place] pandemic. His sugar is not controlled at this time. EKG 10/1/2020 showed SR 87 bpm.     Interval History since last office visit: Patient underwent ILR implantation 10/16/2020. Today, 7/20/2021, ECG demonstrates SR (98). ILR interrogation demonstrated numerous tachy events (125-130) with the longest lasting 11 minutes on 7/13/21. All events appear to be ST. He reports that he is doing ok today. He reports that he is fatigued all of the time now. He reports that his HR gets high with moderate exertion such as bringing in garbage cans from the driveway. He reports a couple of episodes of feeling syncopal while watching TV. He reports that these symptoms started after his last hospitalization. He states that his HR on average stays between 80-90 bpm. He reports that he has constant ringing in his ears and that he follows with an ENT and has had multiple ear surgeries. He reports that he is taking his medications as prescribed and tolerates them well. Denies recent issues with bleeding or bruising. Patient denies current edema, chest pain. Past Medical History:   has a past medical history of A-fib ([address] Allergy to environmental factors, Asthma, Diabetes mellitus ([address] HTN (hypertension), Mixed hyperlipidemia, and TIA (transient ischemic attack).     Past Surgical History:   has a past surgical history that includes External ear surgery; Foot surgery; Tonsillectomy; Adenoidectomy; and Insertable Cardiac Monitor (Left, 10/16/2020). Allergies:  Penicillins and Codeine    Social History:   reports that he quit smoking about 5 years ago. His smoking use included cigars and cigarettes. He has a 15.00 pack-year smoking history. He has never used smokeless tobacco. He reports current alcohol use. He reports that he does not use drugs. Family History: family history includes Asthma in his brother; Cancer in his father; Diabetes in his maternal grandmother; Heart Disease in his maternal uncle and mother. Uncle had strokes, younger sister had several strokes    Home Medications:    Prior to Admission medications    Medication Sig Start Date End Date Taking?  Authorizing Provider   Dulaglutide (TRULICITY) 8.52 ED/6.1RN SOPN Inject 0.75 mg into the skin once a week 7/8/21  Yes Marlyn Stark DO   WIXELA INHUB 250-50 MCG/DOSE AEPB INHALE 1 PUFF INTO THE LUNGS EVERY 12 HOURS 5/26/21  Yes DEMETRI Stahl - CNP   atorvastatin (LIPITOR) 40 MG tablet TAKE 1 TABLET BY MOUTH EVERYDAY AT BEDTIME 5/25/21  Yes Julissa See,    metFORMIN (GLUCOPHAGE) 500 MG tablet TAKE 2 TABLETS BY MOUTH TWICE A DAY WITH MEALS 3/25/21  Yes Marlyn Stark DO   glipiZIDE (GLUCOTROL) 10 MG tablet Take 1 tablet by mouth 2 times daily (before meals) 3/18/21  Yes Marlyn Stark DO   metoprolol succinate (TOPROL XL) 100 MG extended release tablet Take 1 tablet by mouth 2 times daily 1/20/21  Yes Jamilah Romero APRN - CNP   rivaroxaban (XARELTO) 20 MG TABS tablet Take 1 tablet by mouth daily (with breakfast) 1/11/21  Yes Annette Yañez MD   lisinopril (PRINIVIL;ZESTRIL) 10 MG tablet TAKE ONE TABLET BY MOUTH DAILY 1/5/21  Yes Marlyn Stark DO   methocarbamol (ROBAXIN) 500 MG tablet Take 1 tablet by mouth 3 times daily as needed (pain) 1/5/21  Yes Marlyn Stark DO   fluticasone-vilanterol (BREO ELLIPTA) 100-25 MCG/INH AEPB inhaler Inhale 1 puff into the lungs daily 6/8/20  Yes Marlyn Stark DO   fluticasone (FLONASE) 50 MCG/ACT nasal spray 2 sprays by Each Nostril route daily  Patient taking differently: 2 sprays by Each Nostril route daily as needed  3/4/20  Yes Ar Chase DO   aspirin 81 MG tablet Take 81 mg by mouth daily   Yes Historical Provider, MD   albuterol sulfate (PROAIR RESPICLICK) 774 (90 Base) MCG/ACT aerosol powder inhalation INHALE 2 PUFFS EVERY 6 HOURS AS NEEDED FOR WHEEZING/SHORTNESS OF BREATH 12/2/19  Yes Marlyn Stark DO   vitamin D (CHOLECALCIFEROL) 1000 UNIT TABS tablet Take 2,000 Units by mouth 2 times daily    Yes Historical Provider, MD   glucose blood VI test strips (ASCENSIA AUTODISC VI;ONE TOUCH ULTRA TEST VI) strip Test blood sugars twice daily 2/26/16  Yes Marlyn Stark DO   [employer] LANCETS ORIGINAL MISC Check twice daily. Lancets approved by insurance company and patient choice 1/22/16  Yes Kady Sr DO       REVIEW OF SYSTEMS:    All 14-point review of systems are completed and  pertinent positives are mentioned in the history of present illness. Other  systems are reviewed and are negative. Physical Examination:    /60   Pulse 100   Ht 6' 2\" (1.88 m)   Wt 282 lb (127.9 kg)   SpO2 96%   BMI 36.21 kg/m²      Constitutional and General Appearance:    alert, cooperative, no distress and appears stated age  [de-identified]:    PERRLA, no cervical lymphadenopathy. No masses palpable. Normal oral  mucosa  Respiratory:  · Normal excursion and expansion without use of accessory muscles  · Resp Auscultation: Normal breath sounds without dullness or wheezing  Cardiovascular:  · The apical impulse is not displaced  · Heart tones are crisp and normal. regular S1 and S2. Abdomen:  · No masses or tenderness  · Bowel sounds present  Extremities:  ·  No Cyanosis or Clubbing  ·  Lower extremity edema: No  · Skin: Warm and dry  Neurological:  · Alert and oriented.   · Moves all extremities well  · No abnormalities of mood, affect, memory, mentation, or behavior are noted    DATA:    ECG 7/20/21: Personally reviewed. ECHO: 9/15/2020   Left ventricle: Not well visualized. Systolic function was normal. The estimated ejection fraction    was in the range of 60% to 65%. Images were inadequate for LV wall motion assessment. Left    ventricular diastolic function parameters were normal.  - Right ventricle: Systolic function was normal. TAPSE: 2.9cm. ECHO: 8/04/2017  Summary   Normal systolic function with an estimated ejection fraction of 60%. Mild concentric left ventricular hypertrophy. No regional wall motion abnormalities are seen. Grade I diastolic dysfunction with normal filing pressure. [id number] Score for Atrial Fibrillation Stroke Risk   Risk   Factors  Component Value   C CHF No 0   H HTN Yes 1   A2 Age >= 76 No,  (62 y.o.) 0   D DM Yes 1   S2 Prior Stroke/TIA Yes 2   V Vascular Disease No 0   A Age 74-69 No,  (62 y.o.) 0   Sc Sex male 0    [id number]  Score  4   Score last updated 3/9/20 45:94 AM EDT    Click here for a link to the UpToDate guideline \"Atrial Fibrillation: Anticoagulation therapy to prevent embolization    Disclaimer: Risk Score calculation is dependent on accuracy of patient problem list and past encounter diagnosis. IMPRESSION:    1. Paroxysmal atrial [id number] Score 2 (DM and HTN)  03/10/2020  Patient is a 58-year-old male with a medical history significant for diabetes, asthma, hypertension, and paroxysmal atrial fibrillation who presents with recurrent episodes of palpitations and tachycardia with heart rates in the 120s to 130s. Patient was diagnosed with atrial fibrillation based on a monitor back in 2016 that showed a low burden of atrial fibrillation approximately 0.5%. Since then he has been on anticoagulation and rate control. He is compliant with all his medications. He denies history of stroke.   He was referred to me because of a resting heart rate with mild activity in the 120s to 130s mistyped being on a beta-blocker. At this point I am unclear what his tachycardia is from. He does have a history of atrial tachycardia before in the past.  In order to see what rhythm he is in during these episodes I would like to offer him a one-week monitor since episodes occur daily. Patient will return monitor and we will follow-up with him as far as the results of the monitor. Ending on the results we will determine whether or not to pursue more aggressive therapy. 03/31/2020  Patient presents for follow-up. He was recently seen in the emergency room with tachycardia/palpitations. He was discharged for follow-up. His monitor shows atrial tachycardia without any atrial fibrillation. We discussed increasing his rate control agents. Patient would like to try this before any other intervention. He will follow-up with me in 3 to 6 months. 07/07/2020  Unfortunately patient suffered a suspected TIA on 06/08/2020. He was evaluated by neurology ( along with neurologist). His scan of his head showed severe stenosis without acute thrombus. Patient continues on apixaban however has a strong family history of strokes. Discussed switching him from apixaban to Xarelto. We also discussed warfarin however patient understood and warfarin at this time. As he had a TIA patient is initiated evaluation for atrial fibrillation as it is unclear from his previous monitor whether or not his atrial fibrillation has returned. 10/01/2020  Present for follow-up. He was recently admitted to him emergency [facility] for acute gastroenteritis. His rivaroxaban was changed to apixaban. I initially changed his therapy from apixaban to rivaroxaban because of concern that he may have had a stroke while on apixaban. I do think that apixaban is a better anticoagulant especially people with recurrent strokes.   I will therefore advised him to continue on apixaban for now. We will move forward with a loop monitor.  - Plan for ILR.  - Restart apixaban. - Stop flecainide because of fatigue and side effects. Hopefully ILR will help us determine if need need antiarrhythmic strategy. - Increase metoprolol to 100 mg BID.  - Follow up after ILR.    7/20/2021  Patient presents for follow up. He is now having some fatigue, dizziness and presyncope. His heart rates have increased. I recently increased his metoprolol and am worried this may be the reason for his symptoms. As he is on blood thinners I will ask for some labs. If normal then I will change him to diltiazem or verapamil. I will also refer him to ENT given his history of inner ear issues and dizziness and ongoing ringing in his ears. - CBC, CMP, TSH/FT4, and Hemoglobin A1c. - Switch from metoprolol to diltiazem post labs. - Continue apixaban. - Refer to ENT for dizziness.  - Continue remote monitoring with ILR. RECOMMENDATIONS:  1. Blood work due to fatigue. We will adjust medications after results. 2. Increase activity level and exercise as tolerated. 3. Continue with remote device transmissions every month. 4. Follow up with us in 3 months. QUALITY MEASURES  1. Tobacco Cessation Counseling: NA  2. Retake of BP if >140/90:   Yes  3. Documentation to PCP/referring for new patient:  Sent to PCP at close of office visit  4. CAD patient on anti-platelet: No  5. CAD patient on STATIN therapy:  No  6. Patient with CHF and aFib on anticoagulation:  Yes     All questions and concerns were addressed to the patient/family. Alternatives to my treatment were discussed. Santiago Avery, JORGITO, am scribing for and in the presence of Dr. Yessy Marie. 07/20/21 11:00 AM   Sriram Crowe RN    The scribe's documentation has been prepared under my direction and personally reviewed by me in its entirety.  I confirm that the note above accurately reflects all work, physical examination, the discussion of treatments and procedures, and medical decision making performed by me. Tito Zavala MD personally performed the services described in this documentation as scribed by nurse in my presence, and is both accurate and complete. Electronically signed by Emerson Swanson MD on 7/20/2021 at 1:18 PM     Dr. Mio Castillo MD  [facility]. [address] [place] [address]. [address]. [place], [address]  Phone: [phone]  Fax: [phone]     NOTE: This report was transcribed using voice recognition software. Every effort was made to ensure accuracy, however, inadvertent computerized transcription errors may be present. The scribe's documentation has been prepared under my direction and personally reviewed by me in its entirety. I confirm that the note above accurately reflects all work, physical examination, the discussion of treatments and procedures, and medical decision making performed by me.

## 2021-07-20 NOTE — PATIENT INSTRUCTIONS
RECOMMENDATIONS:  1. Blood work due to fatigue. We will adjust medications after results. 2. Increase activity level and exercise as tolerated. 3. Continue with remote device transmissions every month. 4. Follow up with us in 3 months.

## 2021-07-27 ENCOUNTER — HOSPITAL ENCOUNTER (OUTPATIENT)
Age: 61
Discharge: HOME | End: 2021-07-27
Payer: COMMERCIAL

## 2021-07-27 DIAGNOSIS — Z79.899 MEDICATION MANAGEMENT: ICD-10-CM

## 2021-07-27 LAB
A/G RATIO: 1.5 (ref 1.1–2.2)
ALBUMIN SERPL-MCNC: 4.4 G/DL (ref 3.4–5)
ALP BLD-CCNC: 80 U/L (ref 40–129)
ALT SERPL-CCNC: 65 U/L (ref 10–40)
ANION GAP SERPL CALCULATED.3IONS-SCNC: 10 MMOL/L (ref 3–16)
AST SERPL-CCNC: 24 U/L (ref 15–37)
BASOPHILS ABSOLUTE: 0 K/UL (ref 0–0.2)
BASOPHILS RELATIVE PERCENT: 0.5 %
BILIRUB SERPL-MCNC: 0.3 MG/DL (ref 0–1)
BUN BLDV-MCNC: 19 MG/DL (ref 7–20)
CALCIUM SERPL-MCNC: 9.9 MG/DL (ref 8.3–10.6)
CHLORIDE BLD-SCNC: 102 MMOL/L (ref 99–110)
CO2: 28 MMOL/L (ref 21–32)
CREAT SERPL-MCNC: 0.7 MG/DL (ref 0.8–1.3)
EOSINOPHILS ABSOLUTE: 0.1 K/UL (ref 0–0.6)
EOSINOPHILS RELATIVE PERCENT: 2.5 %
GFR AFRICAN AMERICAN: >60
GFR NON-AFRICAN AMERICAN: >60
GLOBULIN: 2.9 G/DL
GLUCOSE BLD-MCNC: 116 MG/DL (ref 70–99)
HCT VFR BLD CALC: 45.3 % (ref 40.5–52.5)
HEMOGLOBIN: 15.3 G/DL (ref 13.5–17.5)
LYMPHOCYTES ABSOLUTE: 1.4 K/UL (ref 1–5.1)
LYMPHOCYTES RELATIVE PERCENT: 26.4 %
MCH RBC QN AUTO: 29.9 PG (ref 26–34)
MCHC RBC AUTO-ENTMCNC: 33.9 G/DL (ref 31–36)
MCV RBC AUTO: 88.1 FL (ref 80–100)
MONOCYTES ABSOLUTE: 0.4 K/UL (ref 0–1.3)
MONOCYTES RELATIVE PERCENT: 7.3 %
NEUTROPHILS ABSOLUTE: 3.4 K/UL (ref 1.7–7.7)
NEUTROPHILS RELATIVE PERCENT: 63.3 %
PDW BLD-RTO: 13.7 % (ref 12.4–15.4)
PLATELET # BLD: 242 K/UL (ref 135–450)
PMV BLD AUTO: 7.4 FL (ref 5–10.5)
POTASSIUM SERPL-SCNC: 4.7 MMOL/L (ref 3.5–5.1)
RBC # BLD: 5.14 M/UL (ref 4.2–5.9)
SODIUM BLD-SCNC: 140 MMOL/L (ref 136–145)
T4 FREE: 1.3 NG/DL (ref 0.9–1.8)
TOTAL PROTEIN: 7.3 G/DL (ref 6.4–8.2)
TSH REFLEX FT4: 6.45 UIU/ML (ref 0.27–4.2)
WBC # BLD: 5.4 K/UL (ref 4–11)

## 2021-07-27 PROCEDURE — 84439 ASSAY OF FREE THYROXINE: CPT

## 2021-07-27 PROCEDURE — 84443 ASSAY THYROID STIM HORMONE: CPT

## 2021-07-27 PROCEDURE — 36415 COLL VENOUS BLD VENIPUNCTURE: CPT

## 2021-07-27 PROCEDURE — 85025 COMPLETE CBC W/AUTO DIFF WBC: CPT

## 2021-07-27 PROCEDURE — 80053 COMPREHEN METABOLIC PANEL: CPT

## 2021-07-27 PROCEDURE — 83036 HEMOGLOBIN GLYCOSYLATED A1C: CPT

## 2021-07-28 LAB
ESTIMATED AVERAGE GLUCOSE: 191.5 MG/DL
HBA1C MFR BLD: 8.3 %

## 2021-07-29 DIAGNOSIS — E11.9 TYPE 2 DIABETES MELLITUS WITHOUT COMPLICATION, WITHOUT LONG-TERM CURRENT USE OF INSULIN (HCC): ICD-10-CM

## 2021-07-29 DIAGNOSIS — I10 ESSENTIAL HYPERTENSION: ICD-10-CM

## 2021-07-29 RX ORDER — LISINOPRIL 10 MG/1
TABLET ORAL
Qty: 90 TABLET | Refills: 1 | Status: SHIPPED | OUTPATIENT
Start: 2021-07-29 | End: 2022-01-02

## 2021-07-29 NOTE — TELEPHONE ENCOUNTER
Refill Request     Last Seen: Last Seen Department: 7/8/2021  Last Seen by PCP: 7/8/2021    Last Written: 1/5/2021 Lisinopril  3/25/2021 Metformin    Next Appointment:   Future Appointments   Date Time Provider Carmita Donahue   8/4/2021  7:05 AM SCHEDULE, Romi Minnie REMOTE TRANSMISSION [facility]   9/8/2021  7:05 AM SCHEDULE, Romi Minnie REMOTE TRANSMISSION [facility]   10/8/2021  9:00 AM DO GIA Segovia  [place] - [facility]   10/13/2021  7:05 AM SCHEDULE, Romi Hartman REMOTE [facility] Board[facility]   10/20/2021 10:30 AM SCHEDULE, Romi Hartman DEVICE CHECK [facility]   10/20/2021 10:30 AM Cheryl Durant, APRN - CNP [facility]       Future appointment scheduled      Requested Prescriptions     Pending Prescriptions Disp Refills    lisinopril (PRINIVIL;ZESTRIL) 10 MG tablet [Pharmacy Med Name: LISINOPRIL 10 MG TABLET] 90 tablet 1     Sig: TAKE 1 TABLET BY MOUTH EVERY DAY    metFORMIN (GLUCOPHAGE) 500 MG tablet [Pharmacy Med Name: METFORMIN  MG TABLET] 360 tablet 1     Sig: TAKE 2 TABLETS BY MOUTH TWICE A DAY WITH MEALS

## 2021-08-02 ENCOUNTER — TELEPHONE (OUTPATIENT)
Dept: CARDIOLOGY CLINIC | Age: 61
End: 2021-08-02

## 2021-08-02 RX ORDER — DILTIAZEM HYDROCHLORIDE 120 MG/1
120 CAPSULE, COATED, EXTENDED RELEASE ORAL 2 TIMES DAILY
Qty: 180 CAPSULE | Refills: 3 | Status: SHIPPED | OUTPATIENT
Start: 2021-08-02 | End: 2021-10-20 | Stop reason: SDUPTHER

## 2021-08-02 NOTE — TELEPHONE ENCOUNTER
Spoke with patient and relayed message. He reports his HR is in the mid-90s today. 7/20/2021 note said switch from metoprolol to diltiazem. AGK, what dose/strength/frequency of diltiazem? Pharmacy is [facility]. Patient would like a call once the dose/ strength is decided. 7/20/2021 ([address],[address])  Patient presents for follow up. He is now having some fatigue, dizziness and presyncope. His heart rates have increased. I recently increased his metoprolol and am worried this may be the reason for his symptoms. As he is on blood thinners I will ask for some labs. If normal then I will change him to diltiazem or verapamil. I will also refer him to ENT given his history of inner ear issues and dizziness and ongoing ringing in his ears. - CBC, CMP, TSH/FT4, and Hemoglobin A1c. - Switch from metoprolol to diltiazem post labs. - Continue apixaban. - Refer to ENT for dizziness.  - Continue remote monitoring with ILR.

## 2021-08-02 NOTE — TELEPHONE ENCOUNTER
----- Message from Calvin Ortiz MD sent at 7/28/2021  7:01 PM EDT -----  Please let patient know hemoglobin remains elevated. His thyroid function study reflects subclinical hypothyroidism that wont lead to symptoms (follow up with PCP). His CMP was reassuring. Please let him know CBC is reassuring.

## 2021-08-16 NOTE — PROGRESS NOTES
and compliance, Type A tympanogram, consistent with normal middle ear function. LEFT EAR:  Hearing Sensitivity:Mild sloping to a moderately severe sensorineural hearing loss from 250-8000 Hz  Speech Recognition Threshold: 35 dB HL  Word Recognition: Excellent (100%), based on NU-6 25-word list at 75 dBHL with 45 dBHL of masking using recorded speech stimuli. Tympanometry: Normal peak pressure and compliance, Type A tympanogram, consistent with normal middle ear function. Reliability: Good   Transducer: Inserts     See scanned audiogram dated 8/17/2021  for results. PATIENT EDUCATION:       The following items were discussed with the patient:   - Good Communication Strategies  - Hearing Loss and Hearing Aids  - Tinnitus Management Strategies    - Noise-Induced Hearing Loss and use of Hearing Protection Devices (HPDs)     Educational information was shared in the After Visit Summary. RECOMMENDATIONS:                                                                                                                                                                                                                                                            The following items are recommended based on patient report and results from today's appointment:   - Continue medical follow-up with Sina Armendariz DO.   - Retest hearing as medically indicated and/or sooner if a change in hearing is noted. - If desired, schedule a Hearing Aid Evaluation (HAE) appointment to discuss hearing aid options. - Utilize \"Good Communication Strategies\" as discussed to assist in speech understanding with communication partners. - Maintain a sound enriched environment to assist in the management of tinnitus symptoms.  - Use hearing protection devices (HPDs), such as protective ear muffs and ear plugs, when exposed to dangerous sound levels.        Aston Hernandez, AuD  Audiologist    Chart CC'd to: Oni Maradiaga,       Degree of   Hearing Sensitivity dB Range   Within Normal Limits (WNL) 0 - 20   Mild 20 - 40   Moderate 40 - 55   Moderately-Severe 55 - 70   Severe 70 - 90   Profound 90 +

## 2021-08-17 ENCOUNTER — OFFICE VISIT (OUTPATIENT)
Dept: ENT CLINIC | Age: 61
End: 2021-08-17
Payer: COMMERCIAL

## 2021-08-17 ENCOUNTER — PROCEDURE VISIT (OUTPATIENT)
Dept: AUDIOLOGY | Age: 61
End: 2021-08-17
Payer: COMMERCIAL

## 2021-08-17 VITALS
SYSTOLIC BLOOD PRESSURE: 117 MMHG | BODY MASS INDEX: 33.73 KG/M2 | DIASTOLIC BLOOD PRESSURE: 80 MMHG | TEMPERATURE: 97.5 F | HEIGHT: 76 IN | HEART RATE: 97 BPM | WEIGHT: 277 LBS

## 2021-08-17 DIAGNOSIS — H93.13 TINNITUS OF BOTH EARS: ICD-10-CM

## 2021-08-17 DIAGNOSIS — H90.3 SENSORINEURAL HEARING LOSS, BILATERAL: Primary | ICD-10-CM

## 2021-08-17 DIAGNOSIS — H93.13 TINNITUS OF BOTH EARS: Primary | ICD-10-CM

## 2021-08-17 DIAGNOSIS — H90.3 SENSORINEURAL HEARING LOSS (SNHL) OF BOTH EARS: ICD-10-CM

## 2021-08-17 PROCEDURE — 3017F COLORECTAL CA SCREEN DOC REV: CPT | Performed by: OTOLARYNGOLOGY

## 2021-08-17 PROCEDURE — 92567 TYMPANOMETRY: CPT | Performed by: AUDIOLOGIST

## 2021-08-17 PROCEDURE — 92557 COMPREHENSIVE HEARING TEST: CPT | Performed by: AUDIOLOGIST

## 2021-08-17 PROCEDURE — 99204 OFFICE O/P NEW MOD 45 MIN: CPT | Performed by: OTOLARYNGOLOGY

## 2021-08-17 PROCEDURE — 1036F TOBACCO NON-USER: CPT | Performed by: OTOLARYNGOLOGY

## 2021-08-17 PROCEDURE — G8417 CALC BMI ABV UP PARAM F/U: HCPCS | Performed by: OTOLARYNGOLOGY

## 2021-08-17 PROCEDURE — G8427 DOCREV CUR MEDS BY ELIG CLIN: HCPCS | Performed by: OTOLARYNGOLOGY

## 2021-08-17 ASSESSMENT — ENCOUNTER SYMPTOMS
SHORTNESS OF BREATH: 0
VOICE CHANGE: 0
EYE ITCHING: 0
TROUBLE SWALLOWING: 0
SORE THROAT: 0
SINUS PRESSURE: 0
COUGH: 0
FACIAL SWELLING: 0
APNEA: 0

## 2021-08-17 NOTE — PROGRESS NOTES
[facility] [address], [address]  [place], Fanny Carpenter  P: [phone]       Patient     Marjorie Armendariz  1960    ChiefComplaint     Chief Complaint   Patient presents with    Tinnitus     has been off and on for his whole life. Started to happen constantly . Worse when it is quiet around him. History of Present Illness     Ric Valencia is a 68-year-old male here today for evaluation of bilateral hearing loss and tinnitus. Has had longstanding hearing loss history of multiple ear surgeries last being in . Recently had a CVA and since that time bilateral tinnitus worsen. Requires white noise at night to sleep. Denies otalgia, otorrhea, vertigo. Has significant difficulty hearing his significant other.     Past Medical History     Past Medical History:   Diagnosis Date    A-fib [facility])     Allergy to environmental factors     Asthma     Diabetes mellitus ([facility]     HTN (hypertension) 2016    Mixed hyperlipidemia 2016    TIA (transient ischemic attack)        Past Surgical History     Past Surgical History:   Procedure Laterality Date    ADENOIDECTOMY      EXTERNAL EAR SURGERY      multiple ear surgeries    FOOT SURGERY      both    INSERTABLE CARDIAC MONITOR Left 10/16/2020    medtronic    TONSILLECTOMY         Family History     Family History   Problem Relation Age of Onset    Heart Disease Mother     Cancer Father     Asthma Brother     Heart Disease Maternal Uncle     Diabetes Maternal Grandmother        Social History     Social History     Tobacco Use    Smoking status: Former Smoker     Packs/day: 1.00     Years: 15.00     Pack years: 15.00     Types: Cigars, Cigarettes     Quit date: 2015     Years since quittin.6    Smokeless tobacco: Never Used   Vaping Use    Vaping Use: Never used   Substance Use Topics    Alcohol use: Yes     Comment: 2-3 beers/month    Drug use: No        Allergies     Allergies   Allergen Reactions  Penicillins Shortness Of Breath    Codeine Hives       Medications     Current Outpatient Medications   Medication Sig Dispense Refill    dilTIAZem (CARDIZEM CD) 120 MG extended release capsule Take 1 capsule by mouth 2 times daily 180 capsule 3    lisinopril (PRINIVIL;ZESTRIL) 10 MG tablet TAKE 1 TABLET BY MOUTH EVERY DAY 90 tablet 1    metFORMIN (GLUCOPHAGE) 500 MG tablet TAKE 2 TABLETS BY MOUTH TWICE A DAY WITH MEALS 360 tablet 1    Dulaglutide (TRULICITY) 5.38 WB/0.7FO SOPN Inject 0.75 mg into the skin once a week 4 pen 5    WIXELA INHUB 250-50 MCG/DOSE AEPB INHALE 1 PUFF INTO THE LUNGS EVERY 12 HOURS 1 each 3    atorvastatin (LIPITOR) 40 MG tablet TAKE 1 TABLET BY MOUTH EVERYDAY AT BEDTIME 90 tablet 1    glipiZIDE (GLUCOTROL) 10 MG tablet Take 1 tablet by mouth 2 times daily (before meals) 60 tablet 5    rivaroxaban (XARELTO) 20 MG TABS tablet Take 1 tablet by mouth daily (with breakfast) 30 tablet 1    methocarbamol (ROBAXIN) 500 MG tablet Take 1 tablet by mouth 3 times daily as needed (pain) 30 tablet 1    fluticasone-vilanterol (BREO ELLIPTA) 100-25 MCG/INH AEPB inhaler Inhale 1 puff into the lungs daily 30 each 5    fluticasone (FLONASE) 50 MCG/ACT nasal spray 2 sprays by Each Nostril route daily (Patient taking differently: 2 sprays by Each Nostril route daily as needed ) 3 Bottle 1    aspirin 81 MG tablet Take 81 mg by mouth daily      albuterol sulfate (PROAIR RESPICLICK) 263 (90 Base) MCG/ACT aerosol powder inhalation INHALE 2 PUFFS EVERY 6 HOURS AS NEEDED FOR WHEEZING/SHORTNESS OF BREATH 1 Inhaler 11    vitamin D (CHOLECALCIFEROL) 1000 UNIT TABS tablet Take 2,000 Units by mouth 2 times daily       glucose blood VI test strips (ASCENSIA AUTODISC VI;ONE TOUCH ULTRA TEST VI) strip Test blood sugars twice daily 100 each 99    [facility] LANCETS ORIGINAL MISC Check twice daily.  Lancets approved by insurance company and patient choice 100 each 3     No current facility-administered medications for this visit. Review of Systems     Review of Systems   Constitutional: Negative for appetite change, chills, fatigue, fever and unexpected weight change. HENT: Positive for hearing loss and tinnitus. Negative for congestion, ear discharge, ear pain, facial swelling, nosebleeds, postnasal drip, sinus pressure, sneezing, sore throat, trouble swallowing and voice change. Eyes: Negative for itching. Respiratory: Negative for apnea, cough and shortness of breath. Endocrine: Negative for cold intolerance and heat intolerance. Musculoskeletal: Negative for myalgias and neck pain. Skin: Negative for rash. Allergic/Immunologic: Negative for environmental allergies. Neurological: Negative for dizziness and headaches. Psychiatric/Behavioral: Negative for confusion, decreased concentration and sleep disturbance. PhysicalExam     Vitals:    08/17/21 1047   BP: 117/80   Site: Right Upper Arm   Position: Sitting   Cuff Size: Large Adult   Pulse: 97   Temp: 97.5 °F (36.4 °C)   Weight: 277 lb (125.6 kg)   Height: 6' 4\" (1.93 m)       Physical Exam  Constitutional:       General: He is not in acute distress. Appearance: He is well-developed. HENT:      Head: Normocephalic and atraumatic. Right Ear: Ear canal and external ear normal. No drainage. No middle ear effusion. Tympanic membrane is scarred. Tympanic membrane is not bulging. Tympanic membrane has normal mobility. Left Ear: Ear canal and external ear normal. No drainage. No middle ear effusion. Tympanic membrane is scarred. Tympanic membrane is not bulging. Tympanic membrane has normal mobility. Ears:      Comments: Retraction pockets bilaterally     Nose: Septal deviation present. No mucosal edema or rhinorrhea. Mouth/Throat:      Lips: Pink. Mouth: Mucous membranes are moist.      Tongue: No lesions. Palate: No mass. Pharynx: Uvula midline.    Eyes:      Pupils: Pupils are equal, round, and reactive to light. Neck:      Thyroid: No thyroid mass or thyromegaly. Trachea: Trachea and phonation normal.   Cardiovascular:      Pulses: Normal pulses. Pulmonary:      Effort: Pulmonary effort is normal. No accessory muscle usage or respiratory distress. Breath sounds: No stridor. Musculoskeletal:      Cervical back: Full passive range of motion without pain. Lymphadenopathy:      Head:      Right side of head: No submental or submandibular adenopathy. Left side of head: No submental or submandibular adenopathy. Cervical: No cervical adenopathy. Right cervical: No superficial, deep or posterior cervical adenopathy. Left cervical: No superficial, deep or posterior cervical adenopathy. Skin:     General: Skin is warm and dry. Neurological:      Mental Status: He is alert and oriented to person, place, and time. Cranial Nerves: No cranial nerve deficit. Coordination: Coordination normal.      Gait: Gait normal.   Psychiatric:         Thought Content: Thought content normal.                 Assessment and Plan     1. Sensorineural hearing loss (SNHL) of both ears  -Audiogram reviewed-mild sloping to moderately severe sensorineural hearing loss with preserved word recognition score  -Excellent hearing aid candidate    2. Tinnitus of both ears  -Etiology discussed  -Recommend minimizing salt, caffeine, alcohol and stress  -Distraction techniques reviewed  - [facility]      Patient is intending to check with insurance company regarding hearing benefit and will call for SHANDRA Rivera DO  8/17/21      Portions of this note were dictated using Dragon.  There may be linguistic errors secondary to the use of this program.

## 2021-08-17 NOTE — Clinical Note
Dr. Kellen Camargo,    Thank you for your referral for audiometric testing on this patient. Please see the scanned audiogram (under \"Media\" tab) and encounter note for details. If you have any questions, or if there is anything else you need, please let me know.       Izabella Billings  Audiologist  ---  [facility]) Physicians  [facility] - Audiology

## 2021-08-17 NOTE — PATIENT INSTRUCTIONS
Good Communication Strategies    Communication can be challenging for anyone, but can be especially difficult for those with some degree of hearing loss. While we may not be able to control every factor that may lead to difficulty with communication, there are Good Communication Strategies that we can all use in our day-to-day lives. Communication takes both parties working together for it to be successful. Tips as a Listener:   1. Control your environment. It is important to limit the amount of background noise in the room when possible. You should also consider having a good light source in the room to best see the other person. 2. Ask for clarification. Instead of saying \"What?\", you can use parts of what you heard to make a new question. For example, if you heard the word \"Thursday\" but not the rest of the week, you may ask \"What was that about Thursday? \" or \"What did you want to do Thursday? \". This shows the person talking that you are listening and will help them better explain what they are saying. 3. Be an advocate for yourself. If you are hearing but not understanding, tell the other person \"I can hear you, but I need you to slow down when you speak. \"  Or if someone is facing the other direction, say \"I cannot hear you when you are not looking at me when we talk. \"       Tips as a Talker:   - Sit or stand 3 to 6 feet away to maximize audibility         -- It is unrealistic to believe someone else will fully hear your message if you are speaking from across the room or in a different room in the house   - Stay at eye level to help with visual cues   - Make sure you have the persons attention before speaking   - Use facial expressions and gestures to accentuate your message   - Raise your voice slightly (do not scream)   - Speak slowly and distinctly   - Use short, simple sentences   - Rephrase your words if the person is having a hard time understanding you    - To avoid distortion, dont speak directly into a persons ear      Some additional items that may be helpful:   - Remain patient - this is important for both parties   - Write down items that still cannot be heard/understood. You may write with pen/paper or consider typing/texting on a cell phone or smart device. - If background noise is unavoidable, try to keep yourself in a good position in the room. By sitting at a toro on the side of the restaurant (preferably a corner), it will be easier to communicate than if you were sitting at a table in the middle with background noise surrounding you. Keep yourself positioned away from music speakers or heavy foot traffic. Tinnitus: Overview and Management Strategies          Many people have some ringing sounds in their ears once in a while. You may hear a roar, a hiss, a tinkle, or a buzz. The sound usually lasts only a few minutes. If it goes on all the time, you may have tinnitus. Tinnitus is usually caused by long-term exposure to loud noise. This damages the nerves in the inner ear. It can occur with all types of hearing loss. It may be a symptom of almost any ear problem. Tinnitus may be caused by a buildup of earwax. Or, it may be caused by ear infections or certain medicines (especially antibiotics or large amounts of aspirin). You can also hear noises in your ears because of an injury to the ears, drinking too much alcohol or caffeine, or a medical condition. Other conditions may also contribute to tinnitus, including: head and neck trauma, temporomandibular joint disorder (TMJ), sinus pressure and barometric trauma, traumatic brain injury, metabolic disorders, autoimmune disorders, stress, and high blood pressure. You may need tests to evaluate your hearing and to find causes of long-lasting tinnitus. Your doctor may suggest one or more treatments to help you cope with the tinnitus. You can also do things at home to help reduce symptoms.     Follow-up care is a key part of your treatment and safety. Be sure to make and go to all appointments, and call your doctor if you are having problems. It's also a good idea to know your test results and keep a list of the medicines you take. How can you care for yourself at home? · Limit or cut out alcohol, caffeine, and sodium. They can make your symptoms worse. · Do not smoke or use other tobacco products. Nicotine reduces blood flow to the ear and makes tinnitus worse. If you need help quitting, talk to your doctor about stop-smoking programs and medicines. These can increase your chances of quitting for good. · Talk to your doctor about whether to stop taking aspirin and similar products such as ibuprofen or naproxen. · Get exercise often. It can help improve blood flow to the ear. Ways to manage/cope with tinnitus  Some tinnitus may last a long time. To manage your tinnitus, try to:  · Avoid noises that you think caused your tinnitus. If you can't avoid loud noises, wear earplugs or earmuffs. · Ignore the sound by paying attention to other things. Keeping your brain busy with other tasks or background noise can help your brain not focus on the tinnitus. · Try to not give the tinnitus an emotional reaction. Do your best to ignore the sound and not let it bother you. Relax using biofeedback, meditation, or yoga. Feeling stressed and being tired can make tinnitus worse. · Play music or white noise to help you sleep. Background noise may cover up the noise that you hear in your ears. You can buy a tabletop machine or a device that sits under your pillow to play soothing sounds, like ocean waves. · Smart phones have free apps, such as Whist, Relax Melodies, ReSound Relief, and White Noise Lite. These apps have different types of sounds/noise, some of which you can blend together to find sounds that are most soothing to you.   · Hearing aid technology, especially when there is some hearing loss, may help reduce tinnitus symptoms by giving your brain better access to the sounds it is missing. There are some hearing aids with built-in noise generator programs, which may help when amplification alone is not enough. Additional resources may be found through the [employer] at www.daryl.org    When should you call for help? Call 911 anytime you think you may need emergency care. For example, call if:    · You have symptoms of a stroke. These may include:  ? Sudden numbness, tingling, weakness, or loss of movement in your face, arm, or leg, especially on only one side of your body. ? Sudden vision changes. ? Sudden trouble speaking. ? Sudden confusion or trouble understanding simple statements. ? Sudden problems with walking or balance. ? A sudden, severe headache that is different from past headaches. Call your doctor now or seek immediate medical care if:    · You develop other symptoms. These may include hearing loss (or worse hearing loss), balance problems, dizziness, nausea, or vomiting. Watch closely for changes in your health, and be sure to contact your doctor if:    · Your tinnitus moves from both ears to one ear. · Your hearing loss gets worse within 1 day after an ear injury. · Your tinnitus or hearing loss does not get better within 1 week after an ear injury. · Your tinnitus bothers you enough that you want to take medicines to help you cope with it. If you notice changes in your tinnitus and/or your hearing, it is recommended that you have your hearing tested by your audiologist and to follow-up with your physician that manages your hearing loss (such as your ENT or Primary Care doctor). Noise-Induced Hearing Loss  What it is, and what you can do to prevent it      Exposure to loud sounds, in an occupational setting or recreational, can cause permanent hearing loss. Sound is measured in decibels (dB). Noise-induced hearing loss is the ONLY type of preventable hearing loss. safe for 1 hour  o 97 dB is safe for 30 minutes  o 100 dB is safe for 15 minutes  o 103 dB is safe for 7.5 minutes  o 106 dB is safe for 3.75 minutes  o 109 dB is safe for LESS THAN 2 minutes  o 112 dB is safe for LESS THAN 1 minute  o 115 dB is safe for ~ 30 seconds  o 130 dB can cause IMMEDIATE hearing loss   If you are unsure if a sound is too loud, consider checking the sound level with a \"sound level meter\". There are apps on smart devices that can measure the loudness of the sound. They are not as accurate as expensive equipment used by scientists, but it will give you a guesstimate of how loud the sound is, and if it may be damaging to your hearing.  If you cannot avoid loud sounds, here are ways to reduce your exposure:  o 1. Wear hearing protection  - Ear plugs and protective ear muffs can be used to reduce the intensity of the sound. The higher the NRR (noise reduction rating), the better reduction of the intensity of the sound   o 2. Turn the volume down  - When listening to music, turn the volume down, especially when wearing ear buds or headphones. A good rule of thumb is to not go beyond the middle setting on your device. If you can't hear someone talking to you from arm's length away, your music may be at a level that it can cause damage. If someone else can hear your music from 3 feet away, it may also be at a level that it can cause damage. o 3. Walk away from the sound  - If you do not have the ability to wear hearing protection or turn down the volume of the sound, you should do your best to move away from the source of the sound. - Sound decreases in intensity as we move further from the source. The sound will decrease by 6 dB for every doubling of distance from the sound source. Exposure to these sounds may cause permanent damage to your hearing.   If you suspect your hearing has changed, it is recommended that you have your hearing tested by your audiologist.

## 2021-08-26 ENCOUNTER — PROCEDURE VISIT (OUTPATIENT)
Dept: AUDIOLOGY | Age: 61
End: 2021-08-26

## 2021-08-26 DIAGNOSIS — H90.3 SENSORINEURAL HEARING LOSS, BILATERAL: Primary | ICD-10-CM

## 2021-08-26 NOTE — PROGRESS NOTES
Courtney Ventura  [birth year]12 y.o. male   [id number]      HEARING AID EVALUATION    Courtney Ventura was seen today, 8/26/2021 , for a Hearing Aid Evaluation following audiologic evaluation on August 17th, 2021. Courtney Ventura was accompanied by himself. Patient has no prior history of hearing aid use. Patient was found to have insurance benefit of none for hearing aids at [id number]; however, has a benefit through [place]. Hearing aid benefit worksheet scanned into media tab. DATE: 8/26/2021     PROCEDURES:     SOUNDFIELD TESTING:     Name of test Type of amplification Level of signal Level of noise % Correct        Nu-6  None 55dBHL N/A 60% (m)  87% (f)        Nu-6  None 45dBHL N/A 40% (m)  27% (f)         Nu-6 None 55dBHL 45dBHL 53%       LIFESTYLE EVALUATION:      How do you spend most of your day? 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 Left        Land line or cell phone  Android-will use speaker      Provide a guesstimate of the % of the service or meeting that you hear and understand clearly  Usually will say \"what\" 50-60%    Does your hearing problem cause you difficulty when listening to a TV or radio? No can adjust the volume     Do others feel that your TV/radio is too loud? Yes       When is your hearing loss most problematic? Out in public (social life is hampered on), hearing in crowds, working security      In what situation would you most like to hear better? Out in public, private conversations, basically all around       After a discussion of evaluation results, discussion of levels of technology and prices, and lifestyle assessment, Courtney Ventura has decided to pursue hearing aids at another facility due to insurance purposes.        PATIENT EDUCATION:      - Verbally reviewed benefits and limitations of devices and available features in hearing aids. Information was shared verbally with patient during appointment. RECOMMENDATIONS:      - [facility]  -Monitor hearing and retest as medically indicated    No charge for today's appointment.       Izabella Wakefield  Audiologist

## 2021-08-26 NOTE — PATIENT INSTRUCTIONS
Good Communication Strategies    Communication can be challenging for anyone, but can be especially difficult for those with some degree of hearing loss. While we may not be able to control every factor that may lead to difficulty with communication, there are Good Communication Strategies that we can all use in our day-to-day lives. Communication takes both parties working together for it to be successful. Tips as a Listener:   1. Control your environment. It is important to limit the amount of background noise in the room when possible. You should also consider having a good light source in the room to best see the other person. 2. Ask for clarification. Instead of saying \"What?\", you can use parts of what you heard to make a new question. For example, if you heard the word \"Thursday\" but not the rest of the week, you may ask \"What was that about Thursday? \" or \"What did you want to do Thursday? \". This shows the person talking that you are listening and will help them better explain what they are saying. 3. Be an advocate for yourself. If you are hearing but not understanding, tell the other person \"I can hear you, but I need you to slow down when you speak. \"  Or if someone is facing the other direction, say \"I cannot hear you when you are not looking at me when we talk. \"       Tips as a Talker:   - Sit or stand 3 to 6 feet away to maximize audibility         -- It is unrealistic to believe someone else will fully hear your message if you are speaking from across the room or in a different room in the house   - Stay at eye level to help with visual cues   - Make sure you have the persons attention before speaking   - Use facial expressions and gestures to accentuate your message   - Raise your voice slightly (do not scream)   - Speak slowly and distinctly   - Use short, simple sentences   - Rephrase your words if the person is having a hard time understanding you    - To avoid distortion, dont speak schedule a \"Hearing Aid Evaluation\" with an audiologist to discuss your lifestyle, features of hearing aid technology, and styles of hearing aids available. It is recommended that you contact your insurance company to determine if you have a hearing aid benefit, as this may dictate who you can see for these services. · Have hearing tests as your doctor suggests. They can show whether your hearing has changed. Your hearing aid may need to be adjusted. · Use other assistive devices as needed. These may include:  ? Telephone amplifiers and hearing aids that can connect to a television, stereo, radio, or microphone. ? Devices that use lights or vibrations. These alert you to the doorbell, a ringing telephone, or a baby monitor. ? Television closed-captioning. This shows the words at the bottom of the screen. Most new TVs can do this. ? TTY (text telephone). This lets you type messages back and forth on the telephone instead of talking or listening. These devices are also called TDD. When messages are typed on the keyboard, they are sent over the phone line to a receiving TTY. The message is shown on a monitor. · Use pagers, fax machines, text, and email if it is hard for you to communicate by telephone. · Try to learn a listening technique called speech-reading. It is not lip-reading. You pay attention to people's gestures, expressions, posture, and tone of voice. These clues can help you understand what a person is saying. Face the person you are talking to, and have him or her face you. Make sure the lighting is good. You need to see the other person's face clearly. · Think about counseling if you need help to adjust to your hearing loss. When should you call for help? Watch closely for changes in your health, and be sure to contact your doctor if:    · You think your hearing is getting worse. · You have new symptoms, such as dizziness or nausea.

## 2021-08-30 ENCOUNTER — TELEPHONE (OUTPATIENT)
Dept: CARDIOLOGY CLINIC | Age: 61
End: 2021-08-30

## 2021-08-30 DIAGNOSIS — Z45.09 ENCOUNTER FOR LOOP RECORDER CHECK: ICD-10-CM

## 2021-08-30 NOTE — TELEPHONE ENCOUNTER
EP/Palpitations/Fast Heart Rate:      1) When did your symptoms start last night? Are your symptoms constant or do they come and go? Comes and goes. 2) Can you tell me what your heart rate is? 107  Last night was bouncing from 130-160 today it is 107.    3) Have you taken any medication for this today? yes   If so what? metoprolol    4) Any recent medication changes? no  Pt has asthma  so did a breathing treatment which did help some. Please advise.

## 2021-08-30 NOTE — TELEPHONE ENCOUNTER
Manual interrogation of implanted cardiac event monitor shows normal function. Patient has a history of paroxysmal tachycardia, pAF, and TIA. Takes Toprol XL and Xarelto. Patient's last device interrogation was on 7/20. Since last interrogation, numerous tachy events recorded - V rates range from 122-167 bpm. Events appear to show ST w/ frequent ectopy and pSVT. CARTER JEAN-BAPTISTE to review. See P[facility] report under the Cardiology tab. [address] please review and advise.

## 2021-08-31 NOTE — TELEPHONE ENCOUNTER
What time should the ov be scheduled for on 9-2-21? [facility] clinic is 815-1230pm. [facility] has procedures starting at 1pm as well.  Please advise

## 2021-08-31 NOTE — TELEPHONE ENCOUNTER
Per [address] please schedule pt for Thursday 9/2/21 so [facility] and see in OV and discuss interrogation further. Pt can be reached at [phone].     TY

## 2021-09-07 ENCOUNTER — TELEPHONE (OUTPATIENT)
Dept: FAMILY MEDICINE CLINIC | Age: 61
End: 2021-09-07

## 2021-09-07 NOTE — TELEPHONE ENCOUNTER
Should it be a VV or in office red appt. Tested positive on Sept. 4th but had symptoms x 10 days. Not significantly SOB but somewhat SOB.

## 2021-09-07 NOTE — TELEPHONE ENCOUNTER
If significant shortness of breath would recommend evaluation at the hospital. Also would recommend checking oxygen at home. Otherwise would recommend red visit for evaluation.

## 2021-09-07 NOTE — TELEPHONE ENCOUNTER
Alexander Rosario ( pts son) calling because pt tested positive for covid last week and Alexander Rosario stated that pt is having trouble getting his breathing under control.  Alexander Rosario was wondering if there is a rx that can be called in for the pt, due to pt not wanting to go to the hospital. Please Advise 97

## 2021-09-08 ENCOUNTER — NURSE ONLY (OUTPATIENT)
Dept: CARDIOLOGY CLINIC | Age: 61
End: 2021-09-08
Payer: COMMERCIAL

## 2021-09-08 ENCOUNTER — TELEPHONE (OUTPATIENT)
Dept: FAMILY MEDICINE CLINIC | Age: 61
End: 2021-09-08

## 2021-09-08 DIAGNOSIS — G45.1 TIA INVOLVING RIGHT INTERNAL CAROTID ARTERY: ICD-10-CM

## 2021-09-08 DIAGNOSIS — R06.02 SOB (SHORTNESS OF BREATH): Primary | ICD-10-CM

## 2021-09-08 DIAGNOSIS — Z45.09 ENCOUNTER FOR LOOP RECORDER CHECK: ICD-10-CM

## 2021-09-08 DIAGNOSIS — I63.529 ACUTE ISCHEMIC MULTIFOCAL ANTERIOR CIRCULATION STROKE, UNSPECIFIED LATERALITY (HCC): ICD-10-CM

## 2021-09-08 DIAGNOSIS — I48.0 PAF (PAROXYSMAL ATRIAL FIBRILLATION) (HCC): ICD-10-CM

## 2021-09-08 DIAGNOSIS — I47.9 PAROXYSMAL TACHYCARDIA (HCC): ICD-10-CM

## 2021-09-08 PROCEDURE — G2066 INTER DEVC REMOTE 30D: HCPCS | Performed by: INTERNAL MEDICINE

## 2021-09-08 PROCEDURE — 93298 REM INTERROG DEV EVAL SCRMS: CPT | Performed by: INTERNAL MEDICINE

## 2021-09-08 NOTE — TELEPHONE ENCOUNTER
Called patient regarding coming early before red visit to have a chest xray done at our [facility]. Instructed him to arrive and park on side of our building and call [phone], they will pre register him and call him back when it is ok to come in their side door for xray.  I emailed these instructions to him per his request.

## 2021-09-08 NOTE — PROGRESS NOTES
Remote interrogation of implanted cardiac event monitor shows normal function. Patient has a history of paroxysmal tachycardia, pAF, and TIA. Takes Xarelto and Cardizem. Patient's last device interrogation was on 8/30. Since last interrogation, numerous tachy events recorded - previously reviewed via manual transmisison. Presenting HR @ 580ms. CARTER JEAN-BAPTISTE to review. See Paceart report under the Cardiology tab. Follow up 1 month via [facility].

## 2021-09-09 ENCOUNTER — HOSPITAL ENCOUNTER (OUTPATIENT)
Dept: GENERAL RADIOLOGY | Age: 61
Discharge: HOME | End: 2021-09-09
Payer: COMMERCIAL

## 2021-09-09 ENCOUNTER — OFFICE VISIT (OUTPATIENT)
Dept: FAMILY MEDICINE CLINIC | Age: 61
End: 2021-09-09
Payer: COMMERCIAL

## 2021-09-09 ENCOUNTER — HOSPITAL ENCOUNTER (OUTPATIENT)
Age: 61
Discharge: HOME | End: 2021-09-09
Payer: COMMERCIAL

## 2021-09-09 ENCOUNTER — TELEPHONE (OUTPATIENT)
Dept: FAMILY MEDICINE CLINIC | Age: 61
End: 2021-09-09

## 2021-09-09 VITALS — BODY MASS INDEX: 31.77 KG/M2 | WEIGHT: 261 LBS | HEART RATE: 121 BPM | OXYGEN SATURATION: 94 %

## 2021-09-09 DIAGNOSIS — U07.1 COVID-19: ICD-10-CM

## 2021-09-09 DIAGNOSIS — R11.0 NAUSEA: ICD-10-CM

## 2021-09-09 DIAGNOSIS — U07.1 COVID-19: Primary | ICD-10-CM

## 2021-09-09 DIAGNOSIS — J12.82 PNEUMONIA DUE TO COVID-19 VIRUS: Primary | ICD-10-CM

## 2021-09-09 DIAGNOSIS — U07.1 PNEUMONIA DUE TO COVID-19 VIRUS: Primary | ICD-10-CM

## 2021-09-09 LAB
A/G RATIO: 0.9 (ref 1.1–2.2)
ALBUMIN SERPL-MCNC: 3.2 G/DL (ref 3.4–5)
ALP BLD-CCNC: 83 U/L (ref 40–129)
ALT SERPL-CCNC: 54 U/L (ref 10–40)
ANION GAP SERPL CALCULATED.3IONS-SCNC: 15 MMOL/L (ref 3–16)
AST SERPL-CCNC: 34 U/L (ref 15–37)
BILIRUB SERPL-MCNC: 0.9 MG/DL (ref 0–1)
BUN BLDV-MCNC: 15 MG/DL (ref 7–20)
CALCIUM SERPL-MCNC: 8 MG/DL (ref 8.3–10.6)
CHLORIDE BLD-SCNC: 93 MMOL/L (ref 99–110)
CO2: 23 MMOL/L (ref 21–32)
CREAT SERPL-MCNC: 0.6 MG/DL (ref 0.8–1.3)
GFR AFRICAN AMERICAN: >60
GFR NON-AFRICAN AMERICAN: >60
GLOBULIN: 3.4 G/DL
GLUCOSE BLD-MCNC: 255 MG/DL (ref 70–99)
POTASSIUM SERPL-SCNC: 4.5 MMOL/L (ref 3.5–5.1)
SODIUM BLD-SCNC: 131 MMOL/L (ref 136–145)
TOTAL PROTEIN: 6.6 G/DL (ref 6.4–8.2)

## 2021-09-09 PROCEDURE — 99214 OFFICE O/P EST MOD 30 MIN: CPT | Performed by: PHYSICIAN ASSISTANT

## 2021-09-09 PROCEDURE — 1036F TOBACCO NON-USER: CPT | Performed by: PHYSICIAN ASSISTANT

## 2021-09-09 PROCEDURE — 3017F COLORECTAL CA SCREEN DOC REV: CPT | Performed by: PHYSICIAN ASSISTANT

## 2021-09-09 PROCEDURE — G8428 CUR MEDS NOT DOCUMENT: HCPCS | Performed by: PHYSICIAN ASSISTANT

## 2021-09-09 PROCEDURE — 36415 COLL VENOUS BLD VENIPUNCTURE: CPT | Performed by: PHYSICIAN ASSISTANT

## 2021-09-09 PROCEDURE — 71046 X-RAY EXAM CHEST 2 VIEWS: CPT

## 2021-09-09 PROCEDURE — G8417 CALC BMI ABV UP PARAM F/U: HCPCS | Performed by: PHYSICIAN ASSISTANT

## 2021-09-09 RX ORDER — ALBUTEROL SULFATE 2.5 MG/3ML
2.5 SOLUTION RESPIRATORY (INHALATION) EVERY 4 HOURS PRN
Qty: 25 EACH | Refills: 3 | Status: SHIPPED | OUTPATIENT
Start: 2021-09-09

## 2021-09-09 RX ORDER — ONDANSETRON 4 MG/1
4 TABLET, FILM COATED ORAL 3 TIMES DAILY PRN
Qty: 30 TABLET | Refills: 0 | Status: SHIPPED | OUTPATIENT
Start: 2021-09-09 | End: 2021-10-11

## 2021-09-09 RX ORDER — DOXYCYCLINE HYCLATE 100 MG
100 TABLET ORAL 2 TIMES DAILY
Qty: 14 TABLET | Refills: 0 | Status: SHIPPED | OUTPATIENT
Start: 2021-09-09 | End: 2021-09-16

## 2021-09-09 RX ORDER — PREDNISONE 10 MG/1
40 TABLET ORAL DAILY
Qty: 20 TABLET | Refills: 0 | Status: SHIPPED | OUTPATIENT
Start: 2021-09-09 | End: 2021-09-14

## 2021-09-09 ASSESSMENT — ENCOUNTER SYMPTOMS
CONSTIPATION: 0
ABDOMINAL PAIN: 0
NAUSEA: 1
VOMITING: 0
CHEST TIGHTNESS: 1
SORE THROAT: 0
RHINORRHEA: 0
COUGH: 1
DIARRHEA: 1
SHORTNESS OF BREATH: 0

## 2021-09-09 NOTE — PROGRESS NOTES
2021  Yanira Tobias (: 1960)  59 y.o.    ASSESSMENT and PLAN:  Matthew Benz was seen today for shortness of breath, nausea & vomiting, diarrhea, cough and headache. Diagnoses and all orders for this visit:    Pneumonia due to COVID-19 virus  -     doxycycline hyclate (VIBRA-TABS) 100 MG tablet; Take 1 tablet by mouth 2 times daily for 7 days  -    CBC WITH AUTO DIFFERENTIAL  -     COMPREHENSIVE METABOLIC PANEL  -     predniSONE (DELTASONE) 10 MG tablet; Take 4 tablets by mouth daily for 5 days  -     Misc. Devices (PULSE OXIMETER DELUXE) MISC; 1 Device by Does not apply route daily  -     albuterol (PROVENTIL) (2.5 MG/3ML) 0.083% nebulizer solution; Take 3 mLs by nebulization every 4 hours as needed for Wheezing  - cxr obtained prior to arrival, bilateral ground glass opacities concerning for covid pna. Will treat with doxycycline - pcn allergy and recent zpak. - ED precautions discussed including worsening soa, chest pain, o2 sats less than 90%. Pt to quarantine 14 days from sx onset or 10 days positive test date. Nausea  -     ondansetron (ZOFRAN) 4 MG tablet; Take 1 tablet by mouth 3 times daily as needed for Nausea or Vomiting        Return if symptoms worsen or fail to improve. HPI  Sxs started last week of august- fever, chills, body aches, cough/congestion, diarrhea. Took a Covid test at home  that was positive. Since then has had severe coughing. Doesn't feel short of breath   Has h/o asthma- uses albuterol inhaler with coughing spells with improvement. Cough is productive- clear  No fevers or chest pain. States chest is sore from coughing so much. Severe nausea and vomiting. Poor po intake. Some diarrhea. Review of Systems   Constitutional: Positive for activity change and fatigue. Negative for chills and fever. HENT: Negative for congestion, ear pain, rhinorrhea and sore throat. Eyes: Negative for visual disturbance.    Respiratory: Positive for cough and chest tightness. Negative for shortness of breath. Cardiovascular: Negative for chest pain and palpitations. Gastrointestinal: Positive for diarrhea and nausea. Negative for abdominal pain, constipation and vomiting. Genitourinary: Negative for difficulty urinating and dysuria. Musculoskeletal: Negative for arthralgias and myalgias. Skin: Negative for rash. Neurological: Negative for dizziness, weakness and numbness. Psychiatric/Behavioral: Negative for sleep disturbance. Allergies, past medical history, family history, and social history reviewed and unchanged from previous encounter. Current Outpatient Medications   Medication Sig Dispense Refill    Misc.  Devices (PULSE OXIMETER DELUXE) MISC 1 Device by Does not apply route daily 1 each 0    albuterol (PROVENTIL) (2.5 MG/3ML) 0.083% nebulizer solution Take 3 mLs by nebulization every 4 hours as needed for Wheezing 25 each 3    ondansetron (ZOFRAN) 4 MG tablet Take 1 tablet by mouth 3 times daily as needed for Nausea or Vomiting 30 tablet 0    WIXELA INHUB 250-50 MCG/DOSE AEPB INHALE 1 PUFF INTO THE LUNGS EVERY 12 HOURS 60 each 3    atorvastatin (LIPITOR) 40 MG tablet TAKE 1 TABLET BY MOUTH EVERYDAY AT BEDTIME 90 tablet 1    dilTIAZem (CARDIZEM CD) 120 MG extended release capsule Take 1 capsule by mouth 2 times daily (Patient not taking: Reported on 9/9/2021) 180 capsule 3    lisinopril (PRINIVIL;ZESTRIL) 10 MG tablet TAKE 1 TABLET BY MOUTH EVERY DAY (Patient not taking: Reported on 9/9/2021) 90 tablet 1    metFORMIN (GLUCOPHAGE) 500 MG tablet TAKE 2 TABLETS BY MOUTH TWICE A DAY WITH MEALS (Patient not taking: Reported on 9/9/2021) 360 tablet 1    Dulaglutide (TRULICITY) 2.38 PX/1.6IS SOPN Inject 0.75 mg into the skin once a week (Patient not taking: Reported on 9/9/2021) 4 pen 5    glipiZIDE (GLUCOTROL) 10 MG tablet Take 1 tablet by mouth 2 times daily (before meals) (Patient not taking: Reported on 9/9/2021) 60 tablet 5    rivaroxaban (XARELTO) 20 MG TABS tablet Take 1 tablet by mouth daily (with breakfast) (Patient not taking: Reported on 9/9/2021) 30 tablet 1    methocarbamol (ROBAXIN) 500 MG tablet Take 1 tablet by mouth 3 times daily as needed (pain) (Patient not taking: Reported on 9/9/2021) 30 tablet 1    fluticasone-vilanterol (BREO ELLIPTA) 100-25 MCG/INH AEPB inhaler Inhale 1 puff into the lungs daily (Patient not taking: Reported on 9/9/2021) 30 each 5    fluticasone (FLONASE) 50 MCG/ACT nasal spray 2 sprays by Each Nostril route daily (Patient not taking: Reported on 9/9/2021) 3 Bottle 1    aspirin 81 MG tablet Take 81 mg by mouth daily (Patient not taking: Reported on 9/9/2021)      albuterol sulfate (PROAIR RESPICLICK) 650 (90 Base) MCG/ACT aerosol powder inhalation INHALE 2 PUFFS EVERY 6 HOURS AS NEEDED FOR WHEEZING/SHORTNESS OF BREATH (Patient not taking: Reported on 9/9/2021) 1 Inhaler 11    vitamin D (CHOLECALCIFEROL) 1000 UNIT TABS tablet Take 2,000 Units by mouth 2 times daily  (Patient not taking: Reported on 9/9/2021)      glucose blood VI test strips (ASCENSIA AUTODISC VI;ONE TOUCH ULTRA TEST VI) strip Test blood sugars twice daily (Patient not taking: Reported on 9/9/2021) 100 each 99    [facility] LANCETS ORIG[facility]AL [facility] Check twice daily. Lancets approved by insurance company and patient choice (Patient not taking: Reported on 9/9/2021) 100 each 3     No current facility-administered medications for this visit. Vitals:    09/09/21 1539   Pulse: 121   SpO2: 94%   Weight: 261 lb (118.4 kg)     Estimated body mass index is 31.77 kg/m² as calculated from the following:    Height as of 8/17/21: 6' 4\" (1.93 m). Weight as of this encounter: 261 lb (118.4 kg). Physical Exam  Constitutional:       Appearance: Normal appearance. Cardiovascular:      Rate and Rhythm: Regular rhythm. Tachycardia present. Heart sounds: Normal heart sounds. Pulmonary:      Breath sounds: Normal breath sounds. Abdominal:      Tenderness: There is abdominal tenderness. Neurological:      General: No focal deficit present. Mental Status: He is alert.    Psychiatric:         Mood and Affect: Mood normal.

## 2021-09-10 DIAGNOSIS — E78.2 MIXED HYPERLIPIDEMIA: ICD-10-CM

## 2021-09-10 RX ORDER — ATORVASTATIN CALCIUM 40 MG/1
TABLET, FILM COATED ORAL
Qty: 90 TABLET | Refills: 1 | Status: SHIPPED | OUTPATIENT
Start: 2021-09-10 | End: 2022-03-21

## 2021-09-10 NOTE — TELEPHONE ENCOUNTER
.  Last office visit 9/9/2021     Last written 5/25/21 90 with 1      Next office visit scheduled 10/8/2021    Requested Prescriptions     Pending Prescriptions Disp Refills    atorvastatin (LIPITOR) 40 MG tablet [Pharmacy Med Name: ATORVASTATIN 40 MG TABLET] 90 tablet 1     Sig: TAKE 1 TABLET BY MOUTH EVERYDAY AT BEDTIME

## 2021-09-13 NOTE — TELEPHONE ENCOUNTER
.  Last office visit 9/9/2021     Last written 5/26/21 1 with 3      Next office visit scheduled 10/8/2021    Requested Prescriptions     Pending Prescriptions Disp Refills    Emily Akhtar 250-50 MCG/DOSE AEPB [Pharmacy Med Name: Gwen Lindquist 250-50 INHUB] 60 each 3     Sig: INHALE 1 PUFF INTO THE LUNGS EVERY 12 HOURS

## 2021-09-23 ENCOUNTER — TELEPHONE (OUTPATIENT)
Dept: FAMILY MEDICINE CLINIC | Age: 61
End: 2021-09-23

## 2021-09-23 DIAGNOSIS — R74.8 ELEVATED LIVER ENZYMES: ICD-10-CM

## 2021-09-23 DIAGNOSIS — E87.1 HYPONATREMIA: Primary | ICD-10-CM

## 2021-09-23 NOTE — TELEPHONE ENCOUNTER
Spoke with patient regarding labs and electrolyte abnormalities  Patient reports feeling much better. Tolerating po intake without n/v/d. Recommend recheck labs, order placed, pt to come in at his convenience.

## 2021-10-08 ENCOUNTER — OFFICE VISIT (OUTPATIENT)
Dept: FAMILY MEDICINE CLINIC | Age: 61
End: 2021-10-08
Payer: COMMERCIAL

## 2021-10-08 VITALS
DIASTOLIC BLOOD PRESSURE: 80 MMHG | OXYGEN SATURATION: 97 % | BODY MASS INDEX: 31.65 KG/M2 | HEART RATE: 97 BPM | WEIGHT: 260 LBS | SYSTOLIC BLOOD PRESSURE: 132 MMHG

## 2021-10-08 DIAGNOSIS — E78.2 MIXED HYPERLIPIDEMIA: ICD-10-CM

## 2021-10-08 DIAGNOSIS — E11.9 TYPE 2 DIABETES MELLITUS WITHOUT COMPLICATION, WITHOUT LONG-TERM CURRENT USE OF INSULIN (HCC): Primary | ICD-10-CM

## 2021-10-08 DIAGNOSIS — I10 ESSENTIAL HYPERTENSION: ICD-10-CM

## 2021-10-08 DIAGNOSIS — Z12.11 SCREENING FOR COLON CANCER: ICD-10-CM

## 2021-10-08 DIAGNOSIS — Z12.5 SCREENING PSA (PROSTATE SPECIFIC ANTIGEN): ICD-10-CM

## 2021-10-08 DIAGNOSIS — E55.9 VITAMIN D DEFICIENCY: ICD-10-CM

## 2021-10-08 PROCEDURE — G8428 CUR MEDS NOT DOCUMENT: HCPCS | Performed by: FAMILY MEDICINE

## 2021-10-08 PROCEDURE — 3017F COLORECTAL CA SCREEN DOC REV: CPT | Performed by: FAMILY MEDICINE

## 2021-10-08 PROCEDURE — 1036F TOBACCO NON-USER: CPT | Performed by: FAMILY MEDICINE

## 2021-10-08 PROCEDURE — G8484 FLU IMMUNIZE NO ADMIN: HCPCS | Performed by: FAMILY MEDICINE

## 2021-10-08 PROCEDURE — G8417 CALC BMI ABV UP PARAM F/U: HCPCS | Performed by: FAMILY MEDICINE

## 2021-10-08 PROCEDURE — 3052F HG A1C>EQUAL 8.0%<EQUAL 9.0%: CPT | Performed by: FAMILY MEDICINE

## 2021-10-08 PROCEDURE — 2022F DILAT RTA XM EVC RTNOPTHY: CPT | Performed by: FAMILY MEDICINE

## 2021-10-08 PROCEDURE — 99214 OFFICE O/P EST MOD 30 MIN: CPT | Performed by: FAMILY MEDICINE

## 2021-10-08 NOTE — PROGRESS NOTES
Vijay Restrepo is a 59 y.o. male    Chief Complaint   Patient presents with    Diabetes    Hypertension    Hyperlipidemia       HPI:    Diabetes  He presents for his follow-up diabetic visit. He has type 2 diabetes mellitus. His disease course has been stable. Pertinent negatives for diabetes include no polydipsia. Diabetic complications include heart disease. Risk factors for coronary artery disease include diabetes mellitus, hypertension, male sex and obesity. When asked about current treatments, none were reported. An ACE inhibitor/angiotensin II receptor blocker is not being taken. Hypertension  This is a chronic problem. The current episode started more than 1 year ago. The problem is unchanged. The problem is controlled. Risk factors for coronary artery disease include diabetes mellitus, male gender and obesity. Past treatments include ACE inhibitors and beta blockers. The current treatment provides significant improvement. There are no compliance problems. Hypertensive end-organ damage includes CAD/MI. There is no history of kidney disease. Hyperlipidemia  This is a chronic problem. The current episode started more than 1 year ago. The problem is controlled. Recent lipid tests were reviewed and are low. Exacerbating diseases include diabetes. Pertinent negatives include no myalgias. Current antihyperlipidemic treatment includes statins. The current treatment provides moderate improvement of lipids. Compliance problems include medication cost.  Risk factors for coronary artery disease include hypertension, male sex, obesity and diabetes mellitus. ROS:    Review of Systems   Endocrine: Negative for polydipsia. Musculoskeletal: Negative for myalgias. /80   Pulse 97   Wt 260 lb (117.9 kg)   SpO2 97%   BMI 31.65 kg/m²     Physical Exam:    Physical Exam  Constitutional:       General: He is not in acute distress. Appearance: Normal appearance. He is obese.  He is not ill-appearing or toxic-appearing. HENT:      Head: Normocephalic. Neurological:      Mental Status: He is alert. Psychiatric:         Mood and Affect: Mood normal.         Behavior: Behavior normal.         Thought Content: Thought content normal.         Current Outpatient Medications   Medication Sig Dispense Refill    WIXELA INHUB 250-50 MCG/DOSE AEPB INHALE 1 PUFF INTO THE LUNGS EVERY 12 HOURS 60 each 3    atorvastatin (LIPITOR) 40 MG tablet TAKE 1 TABLET BY MOUTH EVERYDAY AT BEDTIME 90 tablet 1    Misc.  Devices (PULSE OXIMETER DELUXE) MISC 1 Device by Does not apply route daily 1 each 0    albuterol (PROVENTIL) (2.5 MG/3ML) 0.083% nebulizer solution Take 3 mLs by nebulization every 4 hours as needed for Wheezing 25 each 3    ondansetron (ZOFRAN) 4 MG tablet Take 1 tablet by mouth 3 times daily as needed for Nausea or Vomiting 30 tablet 0    dilTIAZem (CARDIZEM CD) 120 MG extended release capsule Take 1 capsule by mouth 2 times daily (Patient not taking: Reported on 9/9/2021) 180 capsule 3    lisinopril (PRINIVIL;ZESTRIL) 10 MG tablet TAKE 1 TABLET BY MOUTH EVERY DAY (Patient not taking: Reported on 9/9/2021) 90 tablet 1    metFORMIN (GLUCOPHAGE) 500 MG tablet TAKE 2 TABLETS BY MOUTH TWICE A DAY WITH MEALS (Patient not taking: Reported on 9/9/2021) 360 tablet 1    Dulaglutide (TRULICITY) 4.83 BC/0.9BH SOPN Inject 0.75 mg into the skin once a week (Patient not taking: Reported on 9/9/2021) 4 pen 5    glipiZIDE (GLUCOTROL) 10 MG tablet Take 1 tablet by mouth 2 times daily (before meals) (Patient not taking: Reported on 9/9/2021) 60 tablet 5    rivaroxaban (XARELTO) 20 MG TABS tablet Take 1 tablet by mouth daily (with breakfast) (Patient not taking: Reported on 9/9/2021) 30 tablet 1    methocarbamol (ROBAXIN) 500 MG tablet Take 1 tablet by mouth 3 times daily as needed (pain) (Patient not taking: Reported on 9/9/2021) 30 tablet 1    fluticasone-vilanterol (BREO ELLIPTA) 100-25 MCG/INH AEPB inhaler Inhale 1 puff into the lungs daily (Patient not taking: Reported on 9/9/2021) 30 each 5    fluticasone (FLONASE) 50 MCG/ACT nasal spray 2 sprays by Each Nostril route daily (Patient not taking: Reported on 9/9/2021) 3 Bottle 1    aspirin 81 MG tablet Take 81 mg by mouth daily (Patient not taking: Reported on 9/9/2021)      albuterol sulfate (PROAIR RESPICLICK) 267 (90 Base) MCG/ACT aerosol powder inhalation INHALE 2 PUFFS EVERY 6 HOURS AS NEEDED FOR WHEEZING/SHORTNESS OF BREATH (Patient not taking: Reported on 9/9/2021) 1 Inhaler 11    vitamin D (CHOLECALCIFEROL) 1000 UNIT TABS tablet Take 2,000 Units by mouth 2 times daily  (Patient not taking: Reported on 9/9/2021)      glucose blood VI test strips (ASCENSIA AUTODISC VI;ONE TOUCH ULTRA TEST VI) strip Test blood sugars twice daily (Patient not taking: Reported on 9/9/2021) 100 each 99    [facility] L[facility] Check twice daily. Lancets approved by insurance company and patient choice (Patient not taking: Reported on 9/9/2021) 100 each 3     No current facility-administered medications for this visit. Assessment:    1. Type 2 diabetes mellitus without complication, without long-term current use of insulin ([facility] [address]    2. Essential hypertension    3. Mixed hyperlipidemia    4. Vitamin D deficiency    5. Screening PSA (prostate specific antigen)    6. Screening for colon cancer        Plan:    1. Type 2 diabetes mellitus without complication, without long-term current use of insulin ([facility])  Improving. Check future A1c. The Trulicity is helping.  - metFORMIN (GLUCOPHAGE) 500 MG tablet; Take 2 tablets by mouth 2 times daily (with meals)  Dispense: 120 tablet; Refill: 5  - glipiZIDE (GLUCOTROL) 5 MG tablet; Take 1 tablet by mouth 2 times daily  Dispense: 60 tablet; Refill: 3  -  DIABETES FOOT EXAM    2. Essential hypertension  Stable. Continue current medications.    - lisinopril (PRINIVIL;ZESTRIL) 10 MG tablet; TAKE ONE TABLET BY MOUTH DAILY Dispense: 90 tablet; Refill: 1    3. Mixed hyperlipidemia  Stable. Continue current medications. - atorvastatin (LIPITOR) 40 MG tablet; TAKE 1 TABLET BY MOUTH DAILY AT BEDTIME  Dispense: 90 tablet; Refill: 1    Return in about 4 months (around 2/8/2022) for Diabetes, Hypertension, Hyperlipidemia.

## 2021-10-09 DIAGNOSIS — R11.0 NAUSEA: ICD-10-CM

## 2021-10-09 DIAGNOSIS — I48.0 PAF (PAROXYSMAL ATRIAL FIBRILLATION) (HCC): ICD-10-CM

## 2021-10-09 DIAGNOSIS — E11.9 TYPE 2 DIABETES MELLITUS WITHOUT COMPLICATION, WITHOUT LONG-TERM CURRENT USE OF INSULIN (HCC): ICD-10-CM

## 2021-10-11 RX ORDER — RIVAROXABAN 20 MG/1
TABLET, FILM COATED ORAL
Qty: 30 TABLET | Refills: 11 | Status: SHIPPED | OUTPATIENT
Start: 2021-10-11 | End: 2022-10-18

## 2021-10-11 RX ORDER — ONDANSETRON 4 MG/1
4 TABLET, FILM COATED ORAL 3 TIMES DAILY PRN
Qty: 30 TABLET | Refills: 0 | Status: SHIPPED | OUTPATIENT
Start: 2021-10-11 | End: 2021-11-18

## 2021-10-11 RX ORDER — GLIPIZIDE 10 MG/1
TABLET ORAL
Qty: 60 TABLET | Refills: 5 | Status: SHIPPED | OUTPATIENT
Start: 2021-10-11 | End: 2022-02-10 | Stop reason: SDUPTHER

## 2021-10-11 NOTE — TELEPHONE ENCOUNTER
Last office visit 10/8/2021     Last written 3- x 5 refills    Next office visit scheduled 02-8-2022    Requested Prescriptions     Pending Prescriptions Disp Refills    glipiZIDE (GLUCOTROL) 10 MG tablet [Pharmacy Med Name: GLIPIZIDE 10 MG TABLET] 60 tablet 5     Sig: TAKE 1 TABLET BY MOUTH TWICE A DAY BEFORE MEALS

## 2021-10-11 NOTE — TELEPHONE ENCOUNTER
CBC 10/08/2021  CMP 10/08/2021  Last OV 07/20/2021  Upcoming OV 10/20/2021 with NPAM   Please advise PRAVIN   TY

## 2021-10-12 NOTE — PROGRESS NOTES
Notice of Disconnected Monitor Disconnected Monitor - See Patient Details    This patients home monitor has [facility] recent [facility]. Last communication occurred on:27-Sep-2021  (16 days ago)  LETTER SENT TO PT TO [facility]. LAST INTERROGATION 21. Observations Summary 02-Sep-2021 00:05 to 27-Sep-2021 00:05. ILR to monitor for AF. Hx pAF, AT, TIA. (oac, toprol xl). No AFib recorded to date. 1 tachy recording shows ST. No symptom activated events. Manual transmission received. Current EC-Oct-2021 22:59:13  No symptom activated events. ST recorded. Average V rates  bpm.  No AFib recorded to date.

## 2021-10-13 ENCOUNTER — NURSE ONLY (OUTPATIENT)
Dept: CARDIOLOGY CLINIC | Age: 61
End: 2021-10-13
Payer: COMMERCIAL

## 2021-10-13 DIAGNOSIS — Z45.09 ENCOUNTER FOR LOOP RECORDER CHECK: ICD-10-CM

## 2021-10-13 DIAGNOSIS — I63.529 ACUTE ISCHEMIC MULTIFOCAL ANTERIOR CIRCULATION STROKE, UNSPECIFIED LATERALITY (HCC): ICD-10-CM

## 2021-10-13 DIAGNOSIS — I48.0 PAF (PAROXYSMAL ATRIAL FIBRILLATION) (HCC): ICD-10-CM

## 2021-10-13 DIAGNOSIS — I47.9 PAROXYSMAL TACHYCARDIA (HCC): ICD-10-CM

## 2021-10-13 PROCEDURE — G2066 INTER DEVC REMOTE 30D: HCPCS | Performed by: INTERNAL MEDICINE

## 2021-10-13 PROCEDURE — 93298 REM INTERROG DEV EVAL SCRMS: CPT | Performed by: INTERNAL MEDICINE

## 2021-10-13 NOTE — LETTER
[address] [facility] [phone]  [address] [facility]  [place]- [phone]    Pacemaker/Defibrillator Clinic          10/13/21        Gilson Bermeo  [address] [facility] [address] [facility] [id number]        Dear Gilson Bermeo    We received an alert on the remote monitor site for your cardiac device that your home transmitter has been disconnected since  9/27. At your earliest convenience, please check your home monitor [address] so we can ensure your cardiac device is functioning normally. If you are having problems or have questions, please call the toll free number on your equipment or below for assistance. [employer] STAY CONNECTED 1 [phone]         Thank you.        [address]

## 2021-10-20 ENCOUNTER — TELEPHONE (OUTPATIENT)
Dept: CARDIOLOGY CLINIC | Age: 61
End: 2021-10-20

## 2021-10-20 ENCOUNTER — NURSE ONLY (OUTPATIENT)
Dept: CARDIOLOGY CLINIC | Age: 61
End: 2021-10-20

## 2021-10-20 ENCOUNTER — OFFICE VISIT (OUTPATIENT)
Dept: CARDIOLOGY CLINIC | Age: 61
End: 2021-10-20
Payer: COMMERCIAL

## 2021-10-20 VITALS
SYSTOLIC BLOOD PRESSURE: 126 MMHG | OXYGEN SATURATION: 96 % | HEART RATE: 94 BPM | HEIGHT: 76 IN | BODY MASS INDEX: 33.79 KG/M2 | WEIGHT: 277.5 LBS | DIASTOLIC BLOOD PRESSURE: 82 MMHG

## 2021-10-20 DIAGNOSIS — I47.1 PAT (PAROXYSMAL ATRIAL TACHYCARDIA) (HCC): ICD-10-CM

## 2021-10-20 DIAGNOSIS — I48.0 PAF (PAROXYSMAL ATRIAL FIBRILLATION) (HCC): Primary | ICD-10-CM

## 2021-10-20 DIAGNOSIS — Z45.09 ENCOUNTER FOR LOOP RECORDER CHECK: ICD-10-CM

## 2021-10-20 PROCEDURE — 99214 OFFICE O/P EST MOD 30 MIN: CPT | Performed by: NURSE PRACTITIONER

## 2021-10-20 RX ORDER — DILTIAZEM HYDROCHLORIDE 120 MG/1
120 CAPSULE, COATED, EXTENDED RELEASE ORAL 2 TIMES DAILY
Qty: 180 CAPSULE | Refills: 3 | Status: SHIPPED | OUTPATIENT
Start: 2021-10-20 | End: 2021-11-12 | Stop reason: ALTCHOICE

## 2021-10-20 NOTE — PROGRESS NOTES
XARELTO 20 MG TABS tablet TAKE 1 TABLET BY MOUTH EVERY DAY WITH BREAKFAST 10/11/21  Yes Jigna Hutson MD   glipiZIDE (GLUCOTROL) 10 MG tablet TAKE 1 TABLET BY MOUTH TWICE A DAY BEFORE MEALS 10/11/21  Yes Marlyn Stark DO   WIXELA INHUB 250-50 MCG/DOSE AEPB INHALE 1 PUFF INTO THE LUNGS EVERY 12 HOURS 9/13/21  Yes Marlyn Stark DO   atorvastatin (LIPITOR) 40 MG tablet TAKE 1 TABLET BY MOUTH EVERYDAY AT BEDTIME 9/10/21  Yes Marlyn Stark DO   Misc. Devices (PULSE OXIMETER DELUXE) MISC 1 Device by Does not apply route daily 9/9/21  Yes Tj Palaciosgm PA   lisinopril (PRINIVIL;ZESTRIL) 10 MG tablet TAKE 1 TABLET BY MOUTH EVERY DAY 7/29/21  Yes Marlyn Stark DO   metFORMIN (GLUCOPHAGE) 500 MG tablet TAKE 2 TABLETS BY MOUTH TWICE A DAY WITH MEALS 7/29/21  Yes Marlyn Stark DO   Dulaglutide (TRULICITY) 5.20 JL/5.2YH SOPN Inject 0.75 mg into the skin once a week 7/8/21  Yes Hossein Villalobos, DO   fluticasone-vilanterol (BREO ELLIPTA) 100-25 MCG/INH AEPB inhaler Inhale 1 puff into the lungs daily 6/8/20  Yes Marlyn Stark DO   fluticasone (FLONASE) 50 MCG/ACT nasal spray 2 sprays by Each Nostril route daily 3/4/20  Yes Ar Chase,    aspirin 81 MG tablet Take 81 mg by mouth daily    Yes Historical Provider, MD   albuterol sulfate (PROAIR RESPICLICK) 997 (90 Base) MCG/ACT aerosol powder inhalation INHALE 2 PUFFS EVERY 6 HOURS AS NEEDED FOR WHEEZING/SHORTNESS OF BREATH 12/2/19  Yes Marlyn Stark DO   vitamin D (CHOLECALCIFEROL) 1000 UNIT TABS tablet Take 2,000 Units by mouth 2 times daily    Yes Historical Provider, MD   glucose blood VI test strips (ASCENSIA AUTODISC VI;ONE TOUCH ULTRA TEST VI) strip Test blood sugars twice daily 2/26/16  Yes Marlyn Stark DO   [facility] LANCETS ORIGINAL MISC Check twice daily.  Lancets approved by insurance company and patient choice 1/22/16  Yes Marlyn Stark DO   ondansetron (ZOFRAN) 4 MG tablet TAKE 1 TABLET BY MOUTH 3 TIMES DAILY AS NEEDED FOR NAUSEA OR VOMITING  Patient not taking: Reported on 10/20/2021 10/11/21   Marlyn Stark DO   albuterol (PROVENTIL) (2.5 MG/3ML) 0.083% nebulizer solution Take 3 mLs by nebulization every 4 hours as needed for Wheezing  Patient not taking: Reported on 10/20/2021 9/9/21   LAKESHA Gaffney   dilTIAZem (CARDIZEM CD) 120 MG extended release capsule Take 1 capsule by mouth 2 times daily  Patient not taking: Reported on 9/9/2021 8/2/21 10/31/21  Surya Laguerre MD   methocarbamol (ROBAXIN) 500 MG tablet Take 1 tablet by mouth 3 times daily as needed (pain)  Patient not taking: Reported on 9/9/2021 1/5/21   Marlyn Xiong DO       Allergies:  Penicillins and Codeine    Social History:   reports that he quit smoking about 5 years ago. His smoking use included cigars and cigarettes. He has a 15.00 pack-year smoking history. He has never used smokeless tobacco. He reports current alcohol use. He reports that he does not use drugs. Family History: family history includes Asthma in his brother; Cancer in his father; Diabetes in his maternal grandmother; Heart Disease in his maternal uncle and mother. All 14 point review of systems are completed and pertinent positives are mentioned in the history of present illness. Other systems are reviewed and are negative. PHYSICAL EXAM:    Vital signs:    /82   Pulse 94   Ht 6' 4\" (1.93 m)   Wt 277 lb 8 oz (125.9 kg)   SpO2 96%   BMI 33.78 kg/m²      Constitutional and general appearance: alert, cooperative, no distress and appears stated age  HEENT: PERRL, no cervical lymphadenopathy. No masses palpable.  Normal oral mucosa  Respiratory:  · Normal excursion and expansion without use of accessory muscles  · Resp auscultation: Normal breath sounds without wheezing, rhonchi, and rales  Cardiovascular:  · The apical impulse is not displaced  · Heart tones are crisp and normal. regular S1 and S2.  · Jugular venous pulsation Normal  · The carotid upstroke is normal in amplitude and contour without delay or bruit  · Peripheral pulses are symmetrical and full   Abdomen:  · No masses or tenderness  · Bowel sounds present  Extremities:  ·  No cyanosis or clubbing  ·  No lower extremity edema  ·  Skin: warm and dry  Neurological:  · Alert and oriented  · Moves all extremities well  · No abnormalities of mood, affect, memory, mentation, or behavior are noted    DATA:    Echo 8/4/2017:      Summary   Normal systolic function with an estimated ejection fraction of 60%. Mild concentric left ventricular hypertrophy. No regional wall motion abnormalities are seen. Grade I diastolic dysfunction with normal filing pressure. Cath 9/2020 ():  CORONARY ARTERIES:   The coronary circulation is left dominant. Left main:  Normal.   LAD:  Minor luminal irregularities.  Minor luminal irregularities. 1st diagonal:  Minor luminal irregularities. Left circumflex:  Minor luminal irregularities. 1st obtuse marginal:  Proximal vessel lesion: There is a 25%   stenosis. 2nd obtuse marginal:  Minor luminal irregularities. LCx posterolateral extension: Lesion: There is a 30% stenosis. Right coronary:  Normal.       All labs and testing reviewed. CARDIOLOGY LABS:   CBC: No results for input(s): WBC, HGB, HCT, PLT in the last 72 hours. BMP: No results for input(s): NA, K, CO2, BUN, CREATININE, LABGLOM, GLUCOSE in the last 72 hours. PT/INR: No results for input(s): PROTIME, INR in the last 72 hours. APTT:No results for input(s): APTT in the last 72 hours. FASTING LIPID PANEL:  Lab Results   Component Value Date    HDL 36 06/09/2020    LDLCALC 89 06/09/2020    TRIG 75 06/09/2020     LIVER PROFILE:No results for input(s): AST, ALT, ALB in the last 72 hours.     IMPRESSION:    Assessment:   Paroxsymal atrial arrhythmias (AT and AF): ongoing    -[id number] score 4 (HTN, TIA, DM)   -Flecainide not tolerated (fatigue)   S/P loop recorder implant 10/2020   -device check per HPI  CAD: stable   -s/p LHC 9/2020 that showed nonobstructive CAD   HTN: controlled   HLD  Asthma   History of TIA  DM   Subclinical hypothyroidism    -noted 7/2021     Plan:   Continue Xarelto   Stop metoprolol as previosuly recommended by Dr. Mariluz Cornell   Start Cardizem 120 mg PO BID for ongoing tachycardia   Continue remote transmissions   Annual labs due 9/2022    Follow up in 3 months     MDM moderate     Cheryl [address] [facility], APRN-CNP  [address]  [phone]

## 2021-10-20 NOTE — PROGRESS NOTES
Patient comes in for interrogation of their implanted loop recorder. Patient has a history of pAF, TIA, and paroxysmal tachycardia. Takes Xarelto. Patient's last device interrogation was on 9/8. Since last interrogation, numerous tachy events recorded - events appear to show ST. Patient is recovering from COVID PNA. Presenting rhythm shows ST at 102 bpm.    Patient will see ELIUD Cheng in office today. See P[facility] report under the Cardiology tab. We will follow the patient remotely.

## 2021-10-20 NOTE — LETTER
Leigha De La Vega Dougdiannequeenie  1960       [facility]   Electrophysiology Outpatient Note              Date:  October 20, 2021  Patient name: Kasia Vicente  YOB: 1960    Primary Care physician: Bonilla Lynn DO    HISTORY OF PRESENT ILLNESS: The patient is a 59 y.o.  male with a history of AF, AFL, ST, TIA, CAD, HLD, DM and asthma. In 2/2020, he wore an event monitor that showed AF. In 3/2020, he wore a subsequent monitor that showed predominantly NSR with one epsiode of AT and no AF/AFL. In 6/2020, he was evaluated in the ED for TIA. CTA of head/neck showed moderate to severe stenosis of proximal M1 segment of right MCA. He has been followed by neurologist Carlo Hodge). In 9/2020, he was evaluated at [facility] for chest pain. Stress test was positive. He underwent LHC which showed nonobstructive CAD. Stress test was determined to be false positive. Echo showed an EF of 60-65%. He was tachycardic at the time and Flecainide was started. In 10/2020, he had a loop recorder implanted. In 9/2021, he had Covid-19    Today he is being seen for AF and AFL. Patient reports that in 9/2021, he was relatively sick with Covid-19 infection/pneumonia. This was accompanied by N/V/D and for approximately 2-3 weeks he was unable to take his medications. Today he reports ongoing dyspnea on exertion and elevated heart rates. Denies chest pain and dizziness. Interrogation of ILR today showed numerous tachy (ST) events. Past Medical History:   has a past medical history of A-fib ([address] Allergy to environmental factors, Asthma, Diabetes mellitus ([address] HTN (hypertension), Mixed hyperlipidemia, and TIA (transient ischemic attack). Past Surgical History:   has a past surgical history that includes External ear surgery; Foot surgery; Tonsillectomy; Adenoidectomy; and Insertable Cardiac Monitor (Left, 10/16/2020).      Home Medications:    Prior to Admission medications    Medication Sig Start Date End Date Taking? Authorizing Provider   XARELTO 20 MG TABS tablet TAKE 1 TABLET BY MOUTH EVERY DAY WITH BREAKFAST 10/11/21  Yes Ladarius Harper MD   glipiZIDE (GLUCOTROL) 10 MG tablet TAKE 1 TABLET BY MOUTH TWICE A DAY BEFORE MEALS 10/11/21  Yes Marlyn Stark DO   WIXELA INHUB 250-50 MCG/DOSE AEPB INHALE 1 PUFF INTO THE LUNGS EVERY 12 HOURS 9/13/21  Yes Marlyn Stark DO   atorvastatin (LIPITOR) 40 MG tablet TAKE 1 TABLET BY MOUTH EVERYDAY AT BEDTIME 9/10/21  Yes Venita Tolbert, DO   Misc. Devices (PULSE OXIMETER DELUXE) MISC 1 Device by Does not apply route daily 9/9/21  Yes LAKESHA Stoner   lisinopril (PRINIVIL;ZESTRIL) 10 MG tablet TAKE 1 TABLET BY MOUTH EVERY DAY 7/29/21  Yes Marlyn Stark DO   metFORMIN (GLUCOPHAGE) 500 MG tablet TAKE 2 TABLETS BY MOUTH TWICE A DAY WITH MEALS 7/29/21  Yes Marlyn Stark DO   Dulaglutide (TRULICITY) 1.31 KM/1.2HF SOPN Inject 0.75 mg into the skin once a week 7/8/21  Yes Venita Tolbert, DO   fluticasone-vilanterol (BREO ELLIPTA) 100-25 MCG/INH AEPB inhaler Inhale 1 puff into the lungs daily 6/8/20  Yes Marlyn Stark DO   fluticasone (FLONASE) 50 MCG/ACT nasal spray 2 sprays by Each Nostril route daily 3/4/20  Yes Ar Chase,    aspirin 81 MG tablet Take 81 mg by mouth daily    Yes Historical Provider, MD   albuterol sulfate (PROAIR RESPICLICK) 991 (90 Base) MCG/ACT aerosol powder inhalation INHALE 2 PUFFS EVERY 6 HOURS AS NEEDED FOR WHEEZING/SHORTNESS OF BREATH 12/2/19  Yes Marlyn Stark DO   vitamin D (CHOLECALCIFEROL) 1000 UNIT TABS tablet Take 2,000 Units by mouth 2 times daily    Yes Historical Provider, MD   glucose blood VI test strips (ASCENSIA AUTODISC VI;ONE TOUCH ULTRA TEST VI) strip Test blood sugars twice daily 2/26/16  Yes Marlyn Stark DO   [employer] LANCETS ORIGINAL MISC Check twice daily.  Lancets approved by insurance company and patient choice 1/22/16  Yes Marlyn Stark,    ondansetron (ZOFRAN) 4 MG tablet TAKE 1 TABLET BY MOUTH 3 TIMES DAILY AS NEEDED FOR NAUSEA OR VOMITING  Patient not taking: Reported on 10/20/2021 10/11/21   Marlyn Stark DO   albuterol (PROVENTIL) (2.5 MG/3ML) 0.083% nebulizer solution Take 3 mLs by nebulization every 4 hours as needed for Wheezing  Patient not taking: Reported on 10/20/2021 9/9/21   LAKESHA Herbert   dilTIAZem (CARDIZEM CD) 120 MG extended release capsule Take 1 capsule by mouth 2 times daily  Patient not taking: Reported on 9/9/2021 8/2/21 10/31/21  Jay Cunningham MD   methocarbamol (ROBAXIN) 500 MG tablet Take 1 tablet by mouth 3 times daily as needed (pain)  Patient not taking: Reported on 9/9/2021 1/5/21   Marlyn Hall DO       Allergies:  Penicillins and Codeine    Social History:   reports that he quit smoking about 5 years ago. His smoking use included cigars and cigarettes. He has a 15.00 pack-year smoking history. He has never used smokeless tobacco. He reports current alcohol use. He reports that he does not use drugs. Family History: family history includes Asthma in his brother; Cancer in his father; Diabetes in his maternal grandmother; Heart Disease in his maternal uncle and mother. All 14 point review of systems are completed and pertinent positives are mentioned in the history of present illness. Other systems are reviewed and are negative. PHYSICAL EXAM:    Vital signs:    /82   Pulse 94   Ht 6' 4\" (1.93 m)   Wt 277 lb 8 oz (125.9 kg)   SpO2 96%   BMI 33.78 kg/m²      Constitutional and general appearance: alert, cooperative, no distress and appears stated age  HEENT: PERRL, no cervical lymphadenopathy. No masses palpable.  Normal oral mucosa  Respiratory:  · Normal excursion and expansion without use of accessory muscles  · Resp auscultation: Normal breath sounds without wheezing, rhonchi, and rales  Cardiovascular:  · The apical impulse is not displaced  · Heart tones are crisp and normal. regular S1 and S2.  · Jugular venous pulsation Normal  · The carotid upstroke is normal in amplitude and contour without delay or bruit  · Peripheral pulses are symmetrical and full   Abdomen:  · No masses or tenderness  · Bowel sounds present  Extremities:  ·  No cyanosis or clubbing  ·  No lower extremity edema  ·  Skin: warm and dry  Neurological:  · Alert and oriented  · Moves all extremities well  · No abnormalities of mood, affect, memory, mentation, or behavior are noted    DATA:    Echo 8/4/2017:      Summary   Normal systolic function with an estimated ejection fraction of 60%. Mild concentric left ventricular hypertrophy. No regional wall motion abnormalities are seen. Grade I diastolic dysfunction with normal filing pressure. Cath 9/2020 ():  CORONARY ARTERIES:   The coronary circulation is left dominant. Left main:  Normal.   LAD:  Minor luminal irregularities.  Minor luminal irregularities. 1st diagonal:  Minor luminal irregularities. Left circumflex:  Minor luminal irregularities. 1st obtuse marginal:  Proximal vessel lesion: There is a 25%   stenosis. 2nd obtuse marginal:  Minor luminal irregularities. LCx posterolateral extension: Lesion: There is a 30% stenosis. Right coronary:  Normal.       All labs and testing reviewed. CARDIOLOGY LABS:   CBC: No results for input(s): WBC, HGB, HCT, PLT in the last 72 hours. BMP: No results for input(s): NA, K, CO2, BUN, CREATININE, LABGLOM, GLUCOSE in the last 72 hours. PT/INR: No results for input(s): PROTIME, INR in the last 72 hours. APTT:No results for input(s): APTT in the last 72 hours. FASTING LIPID PANEL:  Lab Results   Component Value Date    HDL 36 06/09/2020    LDLCALC 89 06/09/2020    TRIG 75 06/09/2020     LIVER PROFILE:No results for input(s): AST, ALT, ALB in the last 72 hours.     IMPRESSION:    Assessment:   Paroxsymal atrial arrhythmias (AT and AF): ongoing    -[id number] score 4 (HTN, TIA, DM)   -Flecainide not tolerated (fatigue)   S/P loop recorder implant 10/2020   -device check per HPI  CAD: stable   -s/p LHC 9/2020 that showed nonobstructive CAD   HTN: controlled   HLD  Asthma   History of TIA  DM   Subclinical hypothyroidism    -noted 7/2021     Plan:   Continue Xarelto   Stop metoprolol as previosuly recommended by Dr. Naheed Santamaria   Start Cardizem 120 mg PO BID for ongoing tachycardia   Continue remote transmissions   Annual labs due 9/2022    Follow up in 3 months     MDM moderate     Cheryl Romero, APRN-CNP  [facility]  [phone]

## 2021-10-20 NOTE — PATIENT INSTRUCTIONS
Continue Xarelto     Stop metoprolol as previously recommended by Dr. Michelle Kovacs     Start Cardizem 120 mg twice a day for ongoing tachycardia     Continue remote transmissions

## 2021-10-20 NOTE — TELEPHONE ENCOUNTER
Patient received a \"text\" from pharmacy that they need clarification on a  script from JACEK CHAMPAGNE today.

## 2021-10-21 DIAGNOSIS — J45.20 MILD INTERMITTENT ASTHMA WITHOUT COMPLICATION: ICD-10-CM

## 2021-10-21 RX ORDER — DILTIAZEM HYDROCHLORIDE 120 MG/1
120 CAPSULE, EXTENDED RELEASE ORAL 2 TIMES DAILY
Qty: 180 CAPSULE | Refills: 1 | Status: SHIPPED | OUTPATIENT
Start: 2021-10-21 | End: 2021-11-12 | Stop reason: ALTCHOICE

## 2021-10-21 RX ORDER — ALBUTEROL SULFATE 90 UG/1
POWDER, METERED RESPIRATORY (INHALATION)
Qty: 1 EACH | Refills: 11 | Status: SHIPPED | OUTPATIENT
Start: 2021-10-21

## 2021-10-21 NOTE — TELEPHONE ENCOUNTER
Prior auth done but per cover my meds prior auth for  diltlazem is not needed. I did speak with pharmacy regarding medication and they can not fill rx per insurance company. Spoke with pt, informed pt on situation with medication.

## 2021-10-21 NOTE — TELEPHONE ENCOUNTER
Spoke with Dave at [facility]. Pt will receive his medication diltlzam from them. Spoke with pt relayed message from Dave from the outpatient pharmacy.

## 2021-10-21 NOTE — TELEPHONE ENCOUNTER
Refill Request     Last Seen: Last Seen Department: 10/8/2021  Last Seen by PCP: 10/8/2021    Last Written: 12/3/2020    Next Appointment:   Future Appointments   Date Time Provider Carmita Donahue   11/17/2021  7:05 AM SCHEDULE, [facility] Aashish Bateman [facility]   12/22/2021  7:05 [facility]   1/26/2022  7:05 AM SCHEDULE, [facility] REMOTE TRANSMISSION Aiyana Eaton [facility]   2/8/2022 10:15 AM DO GIA Segovia  [place] [facility]       Future appointment scheduled      Requested Prescriptions     Pending Prescriptions Disp Refills    albuterol sulfate (PROAIR RESPICLICK) 058 (90 Base) MCG/ACT aerosol powder inhalation [Pharmacy Med Name: Viviana Michaels 90 MCG INHLR]  11     Sig: INHALE 2 PUFFS EVERY 6 HOURS AS NEEDED FOR WHEEZING/SHORTNESS OF BREATH

## 2021-10-21 NOTE — TELEPHONE ENCOUNTER
Spoke with [facility] pharmacy. Pt's insurance will not cove pt to take diltiazem twice a day.    Please advise NPAM   TY

## 2021-11-12 ENCOUNTER — NURSE ONLY (OUTPATIENT)
Dept: CARDIOLOGY CLINIC | Age: 61
End: 2021-11-12
Payer: COMMERCIAL

## 2021-11-12 ENCOUNTER — TELEPHONE (OUTPATIENT)
Dept: CARDIOLOGY CLINIC | Age: 61
End: 2021-11-12

## 2021-11-12 DIAGNOSIS — I47.9 PAROXYSMAL TACHYCARDIA (HCC): Primary | ICD-10-CM

## 2021-11-12 DIAGNOSIS — I48.0 PAF (PAROXYSMAL ATRIAL FIBRILLATION) (HCC): ICD-10-CM

## 2021-11-12 PROCEDURE — 93000 ELECTROCARDIOGRAM COMPLETE: CPT | Performed by: NURSE PRACTITIONER

## 2021-11-12 RX ORDER — DILTIAZEM HYDROCHLORIDE 180 MG/1
180 CAPSULE, COATED, EXTENDED RELEASE ORAL 2 TIMES DAILY
Qty: 60 CAPSULE | Refills: 3 | Status: SHIPPED | OUTPATIENT
Start: 2021-11-12 | End: 2021-11-22

## 2021-11-12 NOTE — TELEPHONE ENCOUNTER
Spoke with patient.  Metoprolol was d/c'd due to elevated resting heart rate and patient was switched to cardizem 10/20/2021 by MILIND.     Instructed patient to come in today for ECG. V/u.

## 2021-11-12 NOTE — TELEPHONE ENCOUNTER
NPALEJANDRA reviewed ECG-    NPAM, would you like to make any changes at this time? Patient's HR has still been running high (105-110 resting HR) since being switched to cardizem.

## 2021-11-17 ENCOUNTER — NURSE ONLY (OUTPATIENT)
Dept: CARDIOLOGY CLINIC | Age: 61
End: 2021-11-17
Payer: COMMERCIAL

## 2021-11-17 DIAGNOSIS — Z45.09 ENCOUNTER FOR LOOP RECORDER CHECK: ICD-10-CM

## 2021-11-17 DIAGNOSIS — I47.9 PAROXYSMAL TACHYCARDIA (HCC): ICD-10-CM

## 2021-11-17 DIAGNOSIS — I48.0 PAF (PAROXYSMAL ATRIAL FIBRILLATION) (HCC): ICD-10-CM

## 2021-11-17 DIAGNOSIS — G45.1 TIA INVOLVING RIGHT INTERNAL CAROTID ARTERY: ICD-10-CM

## 2021-11-17 PROCEDURE — G2066 INTER DEVC REMOTE 30D: HCPCS | Performed by: INTERNAL MEDICINE

## 2021-11-17 PROCEDURE — 93298 REM INTERROG DEV EVAL SCRMS: CPT | Performed by: INTERNAL MEDICINE

## 2021-11-17 NOTE — PROGRESS NOTES
Last Data Send:10-Nov-2021-monitor is connected. ILR to monitor for AF. Hx pAF, AT, TIA. (oac, cardizem cd). No AFib recorded to date. Remote transmission received for patients ILR. EP physician will review. See interrogation under the cardiology tab in the [address] [place] [address] [address] field for more details. Will continue to monitor remotely. Average V rates  bpm. Tachycardia/ST recorded. No new symptom activated events/EGMs to view.

## 2021-11-18 ENCOUNTER — TELEPHONE (OUTPATIENT)
Dept: CARDIOLOGY CLINIC | Age: 61
End: 2021-11-18

## 2021-11-18 NOTE — TELEPHONE ENCOUNTER
Pt sts his pharmacy is not filling his dilTIAZem (CARDIZEM CD) 180 MG extended release capsule. They said they need clarification on dosing. Insurance won't cover if given BID. Please advise.     [facility] [address] [facility], [address] - [phone]

## 2021-11-18 NOTE — LETTER
[address] Cardiology - [address] [address]  Phone: [phone]  Fax: [phone]    DEMETRI Florian CNP     Re: Devi Cárdenas 1960 December 29, 2021    To whom it may concern,     Patient is unable to tolerate higher dose of diltiazem all at once and requires split dosing to reduce side effects. Patient has been taking diltiazem 240mg daily since 11/22/2021 and endorses dizziness when dose is taken all at once rather than split up. This is the only medication we have prescribed that does not cause the patient dizziness and controls both blood pressure and heart rate adequately. He has been on metoprolol in the past, which also made him dizzy and fatigued. Please authorize diltiazem 180mg twice daily as it is medically necessary.     Sincerely,        Cheryl Coulter, APRN - CNP

## 2021-11-22 RX ORDER — DILTIAZEM HYDROCHLORIDE 240 MG/1
240 CAPSULE, COATED, EXTENDED RELEASE ORAL DAILY
Qty: 30 CAPSULE | Refills: 3 | Status: SHIPPED | OUTPATIENT
Start: 2021-11-22 | End: 2022-03-29

## 2021-11-22 NOTE — TELEPHONE ENCOUNTER
Pt sts he is out of his dilTIAZem (CARDIZEM CD) 180 MG extended release capsule. What should he do till PA complete. Please advise.

## 2021-11-22 NOTE — TELEPHONE ENCOUNTER
I will send in an Rx for Cardizem 240 mg PO ONCE daily for him to take until we can get BID dosing approved. Please ask that he continue to monitor his HR and BP. If HR is consistently over 100 bpm at rest, may need to reconsider adding metoprolol back.

## 2021-12-13 NOTE — PROGRESS NOTES
Patient educated on discharge instructions. Patient verbalized understanding. IV and tele removed. Going to schedule f/u with PCP, Neuro, Dr. Rayna Cabezas, and Cards. Given work note or Hospitalist to return to work on Monday 6/15. Yes

## 2021-12-20 RX ORDER — ALBUTEROL SULFATE 90 UG/1
2 AEROSOL, METERED RESPIRATORY (INHALATION) 4 TIMES DAILY PRN
Qty: 18 G | Refills: 2 | Status: SHIPPED | OUTPATIENT
Start: 2021-12-20 | End: 2022-03-03

## 2021-12-20 NOTE — TELEPHONE ENCOUNTER
He could try another pharmacy or just do the regular albuterol inhaler as it is the same medicine as the albuterol nebulizer.

## 2021-12-21 NOTE — PROGRESS NOTES
Notice of Disconnected Monitor Disconnected Monitor: Snoozed - See Patient Details    Disconnected notification snoozed for 15 days on: 12-Dec-2021  Last communication occurred on:10-Nov-2021  (41 days ago)  LETTER SENT TO PT TO [address] [place] [address]. ILR to monitor for AF. Hx pAF, AT, TIA. (oac, cardizem cd). No AFib recorded to date.

## 2021-12-22 ENCOUNTER — NURSE ONLY (OUTPATIENT)
Dept: CARDIOLOGY CLINIC | Age: 61
End: 2021-12-22

## 2021-12-22 DIAGNOSIS — Z45.09 ENCOUNTER FOR LOOP RECORDER CHECK: ICD-10-CM

## 2021-12-22 NOTE — LETTER
[address] [place] [address] [phone]  [address] [facility]  Barbara Courtney- [phone]    Pacemaker/Defibrillator [facility]          12/22/21        Gilson Bermeo  [address] [facility] [phone]        Dear Gilson Bermeo    This letter is to inform you that we did not receive your recently scheduled pacemaker and/or defibrillator, or implanted heart monitor, cellular or telephone transmission. At your earliest convenience, please send a transmission using your home monitor so we can ensure your cardiac device is functioning normally. For future transmissions, please keep in mind that we have an appointment date scheduled for you for this and staff allocated to receive your transmission on your scheduled day. You will receive a reminder to transmit call the day before from our office. If you are having problems sending your transmission, please call the toll free number on your equipment or below for assistance. [employer], [employer]    [phone]      Thank you.        [facility]

## 2022-01-01 DIAGNOSIS — I10 ESSENTIAL HYPERTENSION: ICD-10-CM

## 2022-01-02 RX ORDER — LISINOPRIL 10 MG/1
TABLET ORAL
Qty: 90 TABLET | Refills: 1 | Status: SHIPPED | OUTPATIENT
Start: 2022-01-02 | End: 2022-08-12 | Stop reason: SDUPTHER

## 2022-01-02 NOTE — TELEPHONE ENCOUNTER
.  Refill Request     Last Seen: Last Seen Department: 10/8/2021  Last Seen by PCP: 10/8/2021    Last Written: 7/29/21 90 with 1     Next Appointment:   Future Appointments   Date Time Provider Carmita Donahue   1/18/2022  1:45 PM SCHEDULE, [facility]   1/18/2022  2:15 PM Cheryl Brown, APRN - CNP [facility]   1/26/2022  7:05 AM SCHEDULE, Conrad Tirado REMOTE TRANSMISSION [place] [facility]   2/8/2022 10:15 AM DO GIA Segovia  [place] - [facility]       Requested Prescriptions     Pending Prescriptions Disp Refills    lisinopril (PRINIVIL;ZESTRIL) 10 MG tablet [Pharmacy Med Name: LISINOPRIL 10 MG TABLET] 90 tablet 1     Sig: TAKE 1 TABLET BY MOUTH EVERY DAY

## 2022-01-14 NOTE — PROGRESS NOTES
[address]   Electrophysiology Outpatient Note              Date:  January 18, 2022  Patient name: Cindy Ruiz  YOB: 1960    Primary Care physician: Aniket Galo DO    HISTORY OF PRESENT ILLNESS: The patient is a 59 y.o.  male with a history of AF, AFL, ST, TIA, CAD, HLD, DM and asthma. In 2/2020, he wore an event monitor that showed AF. In 3/2020, he wore a subsequent monitor that showed predominantly NSR with one epsiode of AT and no AF/AFL. In 6/2020, he was evaluated in the ED for TIA. CTA of head/neck showed moderate to severe stenosis of proximal M1 segment of right MCA. He has been followed by neurologist Higinio Gunn. In 9/2020, he was evaluated at [facility] for chest pain. Stress test was positive. He underwent LHC which showed nonobstructive CAD. Stress test was determined to be false positive. Echo showed an EF of 60-65%. He was tachycardic at the time and Flecainide was started. In 10/2020, he had a loop recorder implanted. In 9/2021, he had Covid-19. Today he is being seen for AF and AFL. EKG shows SR with a H of 98 bpm. Device check shows multiple brief tachy events (ST/SVT) on 1/17/2022 and 1/18/2022, longest 9.5 minutes. Reports shortness of breath and elevated HR with physical exertion. Denies chest pain and dizziness. Past Medical History:   has a past medical history of A-fib ([facility] [address] Allergy to environmental factors, Asthma, Diabetes mellitus ([facility] [address] HTN (hypertension), Mixed hyperlipidemia, and TIA (transient ischemic attack). Past Surgical History:   has a past surgical history that includes External ear surgery; Foot surgery; Tonsillectomy; Adenoidectomy; and Insertable Cardiac Monitor (Left, 10/16/2020). Home Medications:    Prior to Admission medications    Medication Sig Start Date End Date Taking?  Authorizing Provider   lisinopril (PRINIVIL;ZESTRIL) 10 MG tablet TAKE 1 TABLET BY MOUTH EVERY DAY 1/2/22  Yes DEMETRI Castañeda albuterol sulfate HFA (VENTOLIN HFA) 108 (90 Base) MCG/ACT inhaler Inhale 2 puffs into the lungs 4 times daily as needed for Wheezing 12/20/21  Yes Marlyn Stark DO   albuterol sulfate (PROAIR RESPICLICK) 121 (90 Base) MCG/ACT aerosol powder inhalation INHALE 2 PUFFS EVERY 6 HOURS AS NEEDED FOR WHEEZING/SHORTNESS OF BREATH 10/21/21  Yes DEMETRI Feldman - CNP   XARELTO 20 MG TABS tablet TAKE 1 TABLET BY MOUTH EVERY DAY WITH BREAKFAST 10/11/21  Yes Kelli Crespo MD   glipiZIDE (GLUCOTROL) 10 MG tablet TAKE 1 TABLET BY MOUTH TWICE A DAY BEFORE MEALS 10/11/21  Yes Marlyn Stark DO   WIXELA INHUB 250-50 MCG/DOSE AEPB INHALE 1 PUFF INTO THE LUNGS EVERY 12 HOURS 9/13/21  Yes Marlyn Stark DO   atorvastatin (LIPITOR) 40 MG tablet TAKE 1 TABLET BY MOUTH EVERYDAY AT BEDTIME 9/10/21  Yes Domenico Ku, DO   Misc. Devices (PULSE OXIMETER DELUXE) MISC 1 Device by Does not apply route daily 9/9/21  Yes LAKESHA Key   metFORMIN (GLUCOPHAGE) 500 MG tablet TAKE 2 TABLETS BY MOUTH TWICE A DAY WITH MEALS 7/29/21  Yes Marlyn Stark DO   Dulaglutide (TRULICITY) 4.06 ZG/6.9GZ SOPN Inject 0.75 mg into the skin once a week 7/8/21  Yes Domenico Ku DO   fluticasone-vilanterol (BREO ELLIPTA) 100-25 MCG/INH AEPB inhaler Inhale 1 puff into the lungs daily 6/8/20  Yes Marlyn Stark DO   fluticasone (FLONASE) 50 MCG/ACT nasal spray 2 sprays by Each Nostril route daily 3/4/20  Yes Ar Chase,    aspirin 81 MG tablet Take 81 mg by mouth daily    Yes Historical Provider, MD   vitamin D (CHOLECALCIFEROL) 1000 UNIT TABS tablet Take 2,000 Units by mouth 2 times daily    Yes Historical Provider, MD   glucose blood VI test strips (ASCENSIA AUTODISC VI;ONE TOUCH ULTRA TEST VI) strip Test blood sugars twice daily 2/26/16  Yes Sotirios Koros, DO   [employer] LANCETS ORIGINAL MISC Check twice daily.  Lancets approved by insurance company and patient choice 1/22/16  Yes Marlyn Stark, DO   dilTIAZem (CARDIZEM CD) 240 MG extended release capsule Take 1 capsule by mouth daily 11/22/21   Cheryl Lopez APRN - CNP   ondansetron (ZOFRAN) 4 MG tablet TAKE 1 TABLET BY MOUTH 3 TIMES DAILY AS NEEDED FOR NAUSEA OR VOMITING  Patient not taking: Reported on 1/18/2022 11/18/21   Marlyn Stark DO   albuterol (PROVENTIL) (2.5 MG/3ML) 0.083% nebulizer solution Take 3 mLs by nebulization every 4 hours as needed for Wheezing  Patient not taking: Reported on 10/20/2021 9/9/21   LAKESHA Hardy   methocarbamol (ROBAXIN) 500 MG tablet Take 1 tablet by mouth 3 times daily as needed (pain)  Patient not taking: Reported on 9/9/2021 1/5/21   Marlyn Teixeira DO       Allergies:  Penicillins and Codeine    Social History:   reports that he quit smoking about 6 years ago. His smoking use included cigars and cigarettes. He has a 15.00 pack-year smoking history. He has never used smokeless tobacco. He reports current alcohol use. He reports that he does not use drugs. Family History: family history includes Asthma in his brother; Cancer in his father; Diabetes in his maternal grandmother; Heart Disease in his maternal uncle and mother. All 14 point review of systems are completed and pertinent positives are mentioned in the history of present illness. Other systems are reviewed and are negative. PHYSICAL EXAM:    Vital signs:    /72   Pulse 108   Ht 6' 4\" (1.93 m)   Wt 276 lb 8 oz (125.4 kg)   SpO2 98%   BMI 33.66 kg/m²      Constitutional and general appearance: alert, cooperative, no distress and appears stated age  HEENT: PERRL, no cervical lymphadenopathy. No masses palpable.  Normal oral mucosa  Respiratory:  · Normal excursion and expansion without use of accessory muscles  · Resp auscultation: Normal breath sounds without wheezing, rhonchi, and rales  Cardiovascular:  · The apical impulse is not displaced  · Heart tones are crisp and normal. regular S1 and S2.  · Jugular venous pulsation Normal  · The carotid upstroke is normal in amplitude and contour without delay or bruit  · Peripheral pulses are symmetrical and full   Abdomen:  · No masses or tenderness  · Bowel sounds present  Extremities:  ·  No cyanosis or clubbing  ·  No lower extremity edema  ·  Skin: warm and dry  Neurological:  · Alert and oriented  · Moves all extremities well  · No abnormalities of mood, affect, memory, mentation, or behavior are noted    DATA:    Echo 8/4/2017:      Summary   Normal systolic function with an estimated ejection fraction of 60%. Mild concentric left ventricular hypertrophy. No regional wall motion abnormalities are seen. Grade I diastolic dysfunction with normal filing pressure. Cath 9/2020 ():  CORONARY ARTERIES:   The coronary circulation is left dominant. Left main:  Normal.   LAD:  Minor luminal irregularities.  Minor luminal irregularities. 1st diagonal:  Minor luminal irregularities. Left circumflex:  Minor luminal irregularities. 1st obtuse marginal:  Proximal vessel lesion: There is a 25%   stenosis. 2nd obtuse marginal:  Minor luminal irregularities. LCx posterolateral extension: Lesion: There is a 30% stenosis. Right coronary:  Normal.       All labs and testing reviewed. CARDIOLOGY LABS:   CBC: No results for input(s): WBC, HGB, HCT, PLT in the last 72 hours. BMP: No results for input(s): NA, K, CO2, BUN, CREATININE, LABGLOM, GLUCOSE in the last 72 hours. PT/INR: No results for input(s): PROTIME, INR in the last 72 hours. APTT:No results for input(s): APTT in the last 72 hours. FASTING LIPID PANEL:  Lab Results   Component Value Date    HDL 36 06/09/2020    LDLCALC 89 06/09/2020    TRIG 75 06/09/2020     LIVER PROFILE:No results for input(s): AST, ALT, ALB in the last 72 hours.     IMPRESSION:    Assessment:   Paroxsymal atrial arrhythmias (AT and AF): ongoing    -[id number] score 4 (HTN, TIA, DM)   -Flecainide not tolerated (fatigue)   Sinus tachycardia: ongoing, stable   S/P loop recorder implant 10/2020   -device check per HPI  CAD: stable   -s/p LHC 9/2020 that showed nonobstructive CAD   HTN: controlled   HLD  Asthma   History of TIA  DM   Subclinical hypothyroidism    -noted 7/2021     Plan:   No changes today as overall heart rates have been controlled   Remote device transmissions  Annual labs due 9/2022  Increase physical activity to improve stamina   Follow up in 4 months     MDM pavan Lopez, DEMETRI-CNP  [address]  [phone]

## 2022-01-18 ENCOUNTER — OFFICE VISIT (OUTPATIENT)
Dept: CARDIOLOGY CLINIC | Age: 62
End: 2022-01-18
Payer: COMMERCIAL

## 2022-01-18 ENCOUNTER — NURSE ONLY (OUTPATIENT)
Dept: CARDIOLOGY CLINIC | Age: 62
End: 2022-01-18

## 2022-01-18 VITALS
BODY MASS INDEX: 33.67 KG/M2 | DIASTOLIC BLOOD PRESSURE: 72 MMHG | HEART RATE: 108 BPM | OXYGEN SATURATION: 98 % | SYSTOLIC BLOOD PRESSURE: 124 MMHG | WEIGHT: 276.5 LBS | HEIGHT: 76 IN

## 2022-01-18 DIAGNOSIS — I48.0 PAF (PAROXYSMAL ATRIAL FIBRILLATION) (HCC): ICD-10-CM

## 2022-01-18 DIAGNOSIS — Z45.09 ENCOUNTER FOR LOOP RECORDER CHECK: ICD-10-CM

## 2022-01-18 DIAGNOSIS — I47.9 PAROXYSMAL TACHYCARDIA (HCC): Primary | ICD-10-CM

## 2022-01-18 PROCEDURE — 3017F COLORECTAL CA SCREEN DOC REV: CPT | Performed by: NURSE PRACTITIONER

## 2022-01-18 PROCEDURE — 93000 ELECTROCARDIOGRAM COMPLETE: CPT | Performed by: NURSE PRACTITIONER

## 2022-01-18 PROCEDURE — G8484 FLU IMMUNIZE NO ADMIN: HCPCS | Performed by: NURSE PRACTITIONER

## 2022-01-18 PROCEDURE — G8427 DOCREV CUR MEDS BY ELIG CLIN: HCPCS | Performed by: NURSE PRACTITIONER

## 2022-01-18 PROCEDURE — 1036F TOBACCO NON-USER: CPT | Performed by: NURSE PRACTITIONER

## 2022-01-18 PROCEDURE — 99214 OFFICE O/P EST MOD 30 MIN: CPT | Performed by: NURSE PRACTITIONER

## 2022-01-18 PROCEDURE — G8417 CALC BMI ABV UP PARAM F/U: HCPCS | Performed by: NURSE PRACTITIONER

## 2022-01-18 NOTE — PATIENT INSTRUCTIONS
Continue Xarelto and Cardizem     Monitor BP at home and call if consistently out of goal ranges    Continue remote transmissions     Slowly increase physical activity to help improve stamina

## 2022-01-18 NOTE — LETTER
Ashli Cote Dougdiannequeenie  1960       [facility]   Electrophysiology Outpatient Note              Date:  January 18, 2022  Patient name: Samina Cornejo  YOB: 1960    Primary Care physician: Marcelo Boone DO    HISTORY OF PRESENT ILLNESS: The patient is a 59 y.o.  male with a history of AF, AFL, ST, TIA, CAD, HLD, DM and asthma. In 2/2020, he wore an event monitor that showed AF. In 3/2020, he wore a subsequent monitor that showed predominantly NSR with one epsiode of AT and no AF/AFL. In 6/2020, he was evaluated in the ED for TIA. CTA of head/neck showed moderate to severe stenosis of proximal M1 segment of right MCA. He has been followed by neurologist Stacie Peacock). In 9/2020, he was evaluated at [facility] for chest pain. Stress test was positive. He underwent LHC which showed nonobstructive CAD. Stress test was determined to be false positive. Echo showed an EF of 60-65%. He was tachycardic at the time and Flecainide was started. In 10/2020, he had a loop recorder implanted. In 9/2021, he had Covid-19. Today he is being seen for AF and AFL. EKG shows SR with a H of 98 bpm. Device check shows multiple brief tachy events (ST/SVT) on 1/17/2022 and 1/18/2022, longest 9.5 minutes. Reports shortness of breath and elevated HR with physical exertion. Denies chest pain and dizziness. Past Medical History:   has a past medical history of A-fib ([address] Allergy to environmental factors, Asthma, Diabetes mellitus ([facility] [address] HTN (hypertension), Mixed hyperlipidemia, and TIA (transient ischemic attack). Past Surgical History:   has a past surgical history that includes External ear surgery; Foot surgery; Tonsillectomy; Adenoidectomy; and Insertable Cardiac Monitor (Left, 10/16/2020). Home Medications:    Prior to Admission medications    Medication Sig Start Date End Date Taking?  Authorizing Provider   lisinopril (PRINIVIL;ZESTRIL) 10 MG tablet TAKE 1 TABLET BY MOUTH EVERY DAY 1/2/22  Yes DEMETRI Bhatti   albuterol sulfate HFA (VENTOLIN HFA) 108 (90 Base) MCG/ACT inhaler Inhale 2 puffs into the lungs 4 times daily as needed for Wheezing 12/20/21  Yes Marlyn Stark DO   albuterol sulfate (PROAIR RESPICLICK) 387 (90 Base) MCG/ACT aerosol powder inhalation INHALE 2 PUFFS EVERY 6 HOURS AS NEEDED FOR WHEEZING/SHORTNESS OF BREATH 10/21/21  Yes DEMETRI Jara - CNP   XARELTO 20 MG TABS tablet TAKE 1 TABLET BY MOUTH EVERY DAY WITH BREAKFAST 10/11/21  Yes Frederick Davis MD   glipiZIDE (GLUCOTROL) 10 MG tablet TAKE 1 TABLET BY MOUTH TWICE A DAY BEFORE MEALS 10/11/21  Yes Marlyn Stark DO   WIXELA INHUB 250-50 MCG/DOSE AEPB INHALE 1 PUFF INTO THE LUNGS EVERY 12 HOURS 9/13/21  Yes Marlyn Stark DO   atorvastatin (LIPITOR) 40 MG tablet TAKE 1 TABLET BY MOUTH EVERYDAY AT BEDTIME 9/10/21  Yes Baylee Okeefe, DO   Misc. Devices (PULSE OXIMETER DELUXE) MISC 1 Device by Does not apply route daily 9/9/21  Yes LAKESHA Mon   metFORMIN (GLUCOPHAGE) 500 MG tablet TAKE 2 TABLETS BY MOUTH TWICE A DAY WITH MEALS 7/29/21  Yes Marlyn Stark DO   Dulaglutide (TRULICITY) 7.16 RB/0.8ZY SOPN Inject 0.75 mg into the skin once a week 7/8/21  Yes Baylee Okeefe DO   fluticasone-vilanterol (BREO ELLIPTA) 100-25 MCG/INH AEPB inhaler Inhale 1 puff into the lungs daily 6/8/20  Yes Marlyn Stark DO   fluticasone (FLONASE) 50 MCG/ACT nasal spray 2 sprays by Each Nostril route daily 3/4/20  Yes Ar Chase, DO   aspirin 81 MG tablet Take 81 mg by mouth daily    Yes Historical Provider, MD   vitamin D (CHOLECALCIFEROL) 1000 UNIT TABS tablet Take 2,000 Units by mouth 2 times daily    Yes Historical Provider, MD   glucose blood VI test strips (ASCENSIA AUTODISC VI;ONE TOUCH ULTRA TEST VI) strip Test blood sugars twice daily 2/26/16  Yes Marlyn Stark, DO   [facility]AL [facility] Check twice daily.  Lancets approved by insurance company and patient choice 1/22/16  Yes Baylee Okeefe, DO dilTIAZem (CARDIZEM CD) 240 MG extended release capsule Take 1 capsule by mouth daily 11/22/21   DEMETRI Lozada - CNP   ondansetron (ZOFRAN) 4 MG tablet TAKE 1 TABLET BY MOUTH 3 TIMES DAILY AS NEEDED FOR NAUSEA OR VOMITING  Patient not taking: Reported on 1/18/2022 11/18/21   Marlyn Stark DO   albuterol (PROVENTIL) (2.5 MG/3ML) 0.083% nebulizer solution Take 3 mLs by nebulization every 4 hours as needed for Wheezing  Patient not taking: Reported on 10/20/2021 9/9/21   LAKESHA Payan   methocarbamol (ROBAXIN) 500 MG tablet Take 1 tablet by mouth 3 times daily as needed (pain)  Patient not taking: Reported on 9/9/2021 1/5/21   Marlyn Sheikh DO       Allergies:  Penicillins and Codeine    Social History:   reports that he quit smoking about 6 years ago. His smoking use included cigars and cigarettes. He has a 15.00 pack-year smoking history. He has never used smokeless tobacco. He reports current alcohol use. He reports that he does not use drugs. Family History: family history includes Asthma in his brother; Cancer in his father; Diabetes in his maternal grandmother; Heart Disease in his maternal uncle and mother. All 14 point review of systems are completed and pertinent positives are mentioned in the history of present illness. Other systems are reviewed and are negative. PHYSICAL EXAM:    Vital signs:    /72   Pulse 108   Ht 6' 4\" (1.93 m)   Wt 276 lb 8 oz (125.4 kg)   SpO2 98%   BMI 33.66 kg/m²      Constitutional and general appearance: alert, cooperative, no distress and appears stated age  HEENT: PERRL, no cervical lymphadenopathy. No masses palpable.  Normal oral mucosa  Respiratory:  · Normal excursion and expansion without use of accessory muscles  · Resp auscultation: Normal breath sounds without wheezing, rhonchi, and rales  Cardiovascular:  · The apical impulse is not displaced  · Heart tones are crisp and normal. regular S1 and S2.  · Jugular venous pulsation Normal  · The carotid upstroke is normal in amplitude and contour without delay or bruit  · Peripheral pulses are symmetrical and full   Abdomen:  · No masses or tenderness  · Bowel sounds present  Extremities:  ·  No cyanosis or clubbing  ·  No lower extremity edema  ·  Skin: warm and dry  Neurological:  · Alert and oriented  · Moves all extremities well  · No abnormalities of mood, affect, memory, mentation, or behavior are noted    DATA:    Echo 8/4/2017:      Summary   Normal systolic function with an estimated ejection fraction of 60%. Mild concentric left ventricular hypertrophy. No regional wall motion abnormalities are seen. Grade I diastolic dysfunction with normal filing pressure. Cath 9/2020 ():  CORONARY ARTERIES:   The coronary circulation is left dominant. Left main:  Normal.   LAD:  Minor luminal irregularities.  Minor luminal irregularities. 1st diagonal:  Minor luminal irregularities. Left circumflex:  Minor luminal irregularities. 1st obtuse marginal:  Proximal vessel lesion: There is a 25%   stenosis. 2nd obtuse marginal:  Minor luminal irregularities. LCx posterolateral extension: Lesion: There is a 30% stenosis. Right coronary:  Normal.       All labs and testing reviewed. CARDIOLOGY LABS:   CBC: No results for input(s): WBC, HGB, HCT, PLT in the last 72 hours. BMP: No results for input(s): NA, K, CO2, BUN, CREATININE, LABGLOM, GLUCOSE in the last 72 hours. PT/INR: No results for input(s): PROTIME, INR in the last 72 hours. APTT:No results for input(s): APTT in the last 72 hours. FASTING LIPID PANEL:  Lab Results   Component Value Date    HDL 36 06/09/2020    LDLCALC 89 06/09/2020    TRIG 75 06/09/2020     LIVER PROFILE:No results for input(s): AST, ALT, ALB in the last 72 hours.     IMPRESSION:    Assessment:   Paroxsymal atrial arrhythmias (AT and AF): ongoing    -[id number] score 4 (HTN, TIA, DM)   -Flecainide not tolerated (fatigue)   Sinus tachycardia: ongoing, stable   S/P loop recorder implant 10/2020   -device check per HPI  CAD: stable   -s/p LHC 9/2020 that showed nonobstructive CAD   HTN: controlled   HLD  Asthma   History of TIA  DM   Subclinical hypothyroidism    -noted 7/2021     Plan:   No changes today as overall heart rates have been controlled   Remote device transmissions  Annual labs due 9/2022  Increase physical activity to improve stamina   Follow up in 4 months     CHELSEA Lopez, DEMETRI-CNP  [address]  [phone]

## 2022-01-18 NOTE — PROGRESS NOTES
Patient comes in for interrogation of their implanted loop recorder. Patient has a history of paroxysmal tachycardia, pAF, and TIA. Takes Cardizem and Xarelto. Patient's last device interrogation was on 12/21. Since last interrogation, tachy events recorded show ST/SVT w/ V rates 122-130 bpm. Longest event x 9.5 minutes. # [id number] shows offset of arrhythmia. Patient will see ELIUD Tripp in office today. See [employer] report under the Cardiology tab. We will follow the patient remotely.

## 2022-01-25 DIAGNOSIS — E11.9 TYPE 2 DIABETES MELLITUS WITHOUT COMPLICATION, WITHOUT LONG-TERM CURRENT USE OF INSULIN (HCC): ICD-10-CM

## 2022-01-25 RX ORDER — DULAGLUTIDE 0.75 MG/.5ML
0.75 INJECTION, SOLUTION SUBCUTANEOUS WEEKLY
Qty: 4 PEN | Refills: 5 | Status: SHIPPED | OUTPATIENT
Start: 2022-01-25 | End: 2022-07-20

## 2022-01-25 NOTE — TELEPHONE ENCOUNTER
.  Refill Request     Last Seen: Last Seen Department: 10/8/2021  Last Seen by PCP: 10/8/2021    Last Written: 7/8/21 4 with 5     Next Appointment:   Future Appointments   Date Time Provider Carmita Donahue   1/26/2022  7:05 AM SCHEDULE, [facility] REMOTE TRANSMISSION [facility]   2/8/2022 10:15 AM DO GIA Segovia  [place] - DYD   4/19/2022  2:00 PM SCHEDULE, [facility]   4/19/2022  2:00 PM Cheryl Hooks, DEMETRI - CNP [facility]       Requested Prescriptions     Pending Prescriptions Disp Refills    TRULICITY 9.17 OX/0.2MA SOPN [Pharmacy Med Name: TRULICITY 4.95 [place]/7.6 ML PEN] 4 pen 5     Sig: INJECT 0.75 MG INTO THE SKIN ONCE A WEEK

## 2022-01-26 ENCOUNTER — NURSE ONLY (OUTPATIENT)
Dept: CARDIOLOGY CLINIC | Age: 62
End: 2022-01-26

## 2022-01-26 NOTE — LETTER
[address] [facility] [phone]  Luige [address]  [address] [phone]    Pacemaker/Defibrillator Clinic        01/28/22        Ashli Tobias  [address] [facility] [address]      Dear Samina Cornejo     We received an alert on the remote monitor site for your cardiac device that your home transmitter is disconnected. At your earliest convenience, please check your home monitor and send a manual transmission so we can ensure your cardiac device is functioning normally. If you are having problems or have questions, please call the toll free number on your equipment or below for assistance. SENDING A MANUAL TRANSMISSION FROM [employer] HOME MONITOR. Follow these steps only for initial transmissions, or when requested by your doctor. [employer] Patient Monitor Transmission Steps:    1. PRESS round GRAY button to get started. 2. LIFT reader when screen shows up arrow. 3. HOLD reader over your REVEAL LINQ device and wait for the GREEN BAR to progress. 4.RETURN reader to the base when screen shows down arrow. 5.CHECK for GREEN BAR progress showing your data is sending. 6.CONFIRM data sent when GREEN check cyndee appears. IF QUESTIONS/CONCERNS or NEED ASSISTANCE WITH THIS PROCESS PLEASE CALL [employer] STAY CONNECTED 1 [phone]       Thank you.        [address]

## 2022-01-28 NOTE — PROGRESS NOTES
Notice of Disconnected Monitor Disconnected Monitor: Snoozed - See Patient Details    Disconnected notification snoozed for 15 days on: 13-Jan-2022  Last communication occurred on:10-Nov-2021  (79 days ago)    Letter sent to pt. ILR to monitor for AF. Hx pAF, AT, TIA. (oac, cardizem cd). No AFib recorded to date of 11/10/21.

## 2022-02-10 ENCOUNTER — VIRTUAL VISIT (OUTPATIENT)
Dept: FAMILY MEDICINE CLINIC | Age: 62
End: 2022-02-10
Payer: COMMERCIAL

## 2022-02-10 ENCOUNTER — NURSE ONLY (OUTPATIENT)
Dept: FAMILY MEDICINE CLINIC | Age: 62
End: 2022-02-10
Payer: COMMERCIAL

## 2022-02-10 DIAGNOSIS — E78.2 MIXED HYPERLIPIDEMIA: ICD-10-CM

## 2022-02-10 DIAGNOSIS — E11.9 TYPE 2 DIABETES MELLITUS WITHOUT COMPLICATION, WITHOUT LONG-TERM CURRENT USE OF INSULIN (HCC): ICD-10-CM

## 2022-02-10 DIAGNOSIS — Z23 NEED FOR TDAP VACCINATION: ICD-10-CM

## 2022-02-10 DIAGNOSIS — I10 ESSENTIAL HYPERTENSION: ICD-10-CM

## 2022-02-10 DIAGNOSIS — E11.9 TYPE 2 DIABETES MELLITUS WITHOUT COMPLICATION, WITHOUT LONG-TERM CURRENT USE OF INSULIN (HCC): Primary | ICD-10-CM

## 2022-02-10 DIAGNOSIS — Z23 NEED FOR TDAP VACCINATION: Primary | ICD-10-CM

## 2022-02-10 DIAGNOSIS — E55.9 VITAMIN D DEFICIENCY: ICD-10-CM

## 2022-02-10 DIAGNOSIS — Z12.5 SCREENING PSA (PROSTATE SPECIFIC ANTIGEN): ICD-10-CM

## 2022-02-10 DIAGNOSIS — E11.8 TYPE 2 DIABETES MELLITUS WITH UNSPECIFIED COMPLICATIONS (HCC): ICD-10-CM

## 2022-02-10 PROCEDURE — G8427 DOCREV CUR MEDS BY ELIG CLIN: HCPCS | Performed by: FAMILY MEDICINE

## 2022-02-10 PROCEDURE — 90715 TDAP VACCINE 7 YRS/> IM: CPT | Performed by: FAMILY MEDICINE

## 2022-02-10 PROCEDURE — 2022F DILAT RTA XM EVC RTNOPTHY: CPT | Performed by: FAMILY MEDICINE

## 2022-02-10 PROCEDURE — 3046F HEMOGLOBIN A1C LEVEL >9.0%: CPT | Performed by: FAMILY MEDICINE

## 2022-02-10 PROCEDURE — 3017F COLORECTAL CA SCREEN DOC REV: CPT | Performed by: FAMILY MEDICINE

## 2022-02-10 PROCEDURE — 99214 OFFICE O/P EST MOD 30 MIN: CPT | Performed by: FAMILY MEDICINE

## 2022-02-10 PROCEDURE — 90471 IMMUNIZATION ADMIN: CPT | Performed by: FAMILY MEDICINE

## 2022-02-10 RX ORDER — GLIPIZIDE 10 MG/1
TABLET ORAL
Qty: 180 TABLET | Refills: 1 | Status: SHIPPED | OUTPATIENT
Start: 2022-02-10 | End: 2022-08-12 | Stop reason: SDUPTHER

## 2022-02-10 SDOH — ECONOMIC STABILITY: FOOD INSECURITY: WITHIN THE PAST 12 MONTHS, YOU WORRIED THAT YOUR FOOD WOULD RUN OUT BEFORE YOU GOT MONEY TO BUY MORE.: NEVER TRUE

## 2022-02-10 SDOH — ECONOMIC STABILITY: FOOD INSECURITY: WITHIN THE PAST 12 MONTHS, THE FOOD YOU BOUGHT JUST DIDN'T LAST AND YOU DIDN'T HAVE MONEY TO GET MORE.: NEVER TRUE

## 2022-02-10 ASSESSMENT — PATIENT HEALTH QUESTIONNAIRE - PHQ9
SUM OF ALL RESPONSES TO PHQ QUESTIONS 1-9: 0
2. FEELING DOWN, DEPRESSED OR HOPELESS: 0
SUM OF ALL RESPONSES TO PHQ9 QUESTIONS 1 & 2: 0
SUM OF ALL RESPONSES TO PHQ QUESTIONS 1-9: 0
1. LITTLE INTEREST OR PLEASURE IN DOING THINGS: 0
SUM OF ALL RESPONSES TO PHQ QUESTIONS 1-9: 0
SUM OF ALL RESPONSES TO PHQ QUESTIONS 1-9: 0

## 2022-02-10 ASSESSMENT — SOCIAL DETERMINANTS OF HEALTH (SDOH): HOW HARD IS IT FOR YOU TO PAY FOR THE VERY BASICS LIKE FOOD, HOUSING, MEDICAL CARE, AND HEATING?: NOT HARD AT ALL

## 2022-02-10 NOTE — PROGRESS NOTES
Cindy Ruiz is a 59 y.o. male    Chief Complaint   Patient presents with    Diabetes     BS: 80    Hypertension    Hyperlipidemia       HPI:    This is a video visit. Consent has been obtained. The patient is at home. Diabetes  He presents for his follow-up diabetic visit. He has type 2 diabetes mellitus. His disease course has been stable. Pertinent negatives for diabetes include no polydipsia. Diabetic complications include heart disease. Risk factors for coronary artery disease include diabetes mellitus, hypertension, male sex and obesity. When asked about current treatments, none were reported. An ACE inhibitor/angiotensin II receptor blocker is not being taken. Hypertension  This is a chronic problem. The current episode started more than 1 year ago. The problem is unchanged. The problem is controlled. Risk factors for coronary artery disease include diabetes mellitus, male gender and obesity. Past treatments include ACE inhibitors and beta blockers. The current treatment provides significant improvement. There are no compliance problems. Hypertensive end-organ damage includes CAD/MI. There is no history of kidney disease. Hyperlipidemia  This is a chronic problem. The current episode started more than 1 year ago. The problem is controlled. Recent lipid tests were reviewed and are low. Exacerbating diseases include diabetes. Pertinent negatives include no myalgias. Current antihyperlipidemic treatment includes statins. The current treatment provides moderate improvement of lipids. Compliance problems include medication cost.  Risk factors for coronary artery disease include hypertension, male sex, obesity and diabetes mellitus. ROS:    Review of Systems   Endocrine: Negative for polydipsia. Musculoskeletal: Negative for myalgias. There were no vitals taken for this visit. Physical Exam:    Physical Exam  Constitutional:       General: He is not in acute distress. Appearance: Normal appearance. He is obese. He is not ill-appearing or toxic-appearing. HENT:      Head: Normocephalic. Neurological:      Mental Status: He is alert. Psychiatric:         Mood and Affect: Mood normal.         Behavior: Behavior normal.         Thought Content: Thought content normal.         Current Outpatient Medications   Medication Sig Dispense Refill    glipiZIDE (GLUCOTROL) 10 MG tablet TAKE 1 TABLET BY MOUTH TWICE A DAY BEFORE MEALS 180 tablet 1    metFORMIN (GLUCOPHAGE) 500 MG tablet TAKE 2 TABLETS BY MOUTH TWICE A DAY WITH MEALS 360 tablet 1    TRULICITY 4.31 XE/9.7LI SOPN INJECT 0.75 MG INTO THE SKIN ONCE A WEEK 4 pen 5    lisinopril (PRINIVIL;ZESTRIL) 10 MG tablet TAKE 1 TABLET BY MOUTH EVERY DAY 90 tablet 1    albuterol sulfate HFA (VENTOLIN HFA) 108 (90 Base) MCG/ACT inhaler Inhale 2 puffs into the lungs 4 times daily as needed for Wheezing 18 g 2    dilTIAZem (CARDIZEM CD) 240 MG extended release capsule Take 1 capsule by mouth daily 30 capsule 3    albuterol sulfate (PROAIR RESPICLICK) 670 (90 Base) MCG/ACT aerosol powder inhalation INHALE 2 PUFFS EVERY 6 HOURS AS NEEDED FOR WHEEZING/SHORTNESS OF BREATH 1 each 11    XARELTO 20 MG TABS tablet TAKE 1 TABLET BY MOUTH EVERY DAY WITH BREAKFAST 30 tablet 11    WIXELA INHUB 250-50 MCG/DOSE AEPB INHALE 1 PUFF INTO THE LUNGS EVERY 12 HOURS 60 each 3    atorvastatin (LIPITOR) 40 MG tablet TAKE 1 TABLET BY MOUTH EVERYDAY AT BEDTIME 90 tablet 1    Misc.  Devices (PULSE OXIMETER DELUXE) MISC 1 Device by Does not apply route daily 1 each 0    albuterol (PROVENTIL) (2.5 MG/3ML) 0.083% nebulizer solution Take 3 mLs by nebulization every 4 hours as needed for Wheezing 25 each 3    fluticasone (FLONASE) 50 MCG/ACT nasal spray 2 sprays by Each Nostril route daily 3 Bottle 1    aspirin 81 MG tablet Take 81 mg by mouth daily       vitamin D (CHOLECALCIFEROL) 1000 UNIT TABS tablet Take 2,000 Units by mouth 2 times daily       glucose blood VI test strips (ASCENSIA AUTODISC VI;ONE TOUCH ULTRA TEST VI) strip Test blood sugars twice daily 100 each 99    [facility] LANCETS ORIGINAL [facility] Check twice daily. Lancets approved by [employer] and patient choice 100 each 3    ondansetron (ZOFRAN) 4 MG tablet TAKE 1 TABLET BY MOUTH 3 TIMES DAILY AS NEEDED FOR NAUSEA OR VOMITING (Patient not taking: Reported on 1/18/2022) 30 tablet 3    methocarbamol (ROBAXIN) 500 MG tablet Take 1 tablet by mouth 3 times daily as needed (pain) (Patient not taking: Reported on 9/9/2021) 30 tablet 1    fluticasone-vilanterol (BREO ELLIPTA) 100-25 MCG/INH AEPB inhaler Inhale 1 puff into the lungs daily (Patient not taking: Reported on 2/10/2022) 30 each 5     No current facility-administered medications for this visit. Assessment:    1. Type 2 diabetes mellitus without complication, without long-term current use of insulin ([facility]    2. Essential hypertension    3. Mixed hyperlipidemia    4. Type 2 diabetes mellitus with unspecified complications ([facility]    5. Need for Tdap vaccination        Plan:    1. Type 2 diabetes mellitus without complication, without long-term current use of insulin (HCC)  Stable. Continue current medications. - metFORMIN (GLUCOPHAGE) 500 MG tablet; Take 2 tablets by mouth 2 times daily (with meals)  Dispense: 120 tablet; Refill: 5  - glipiZIDE (GLUCOTROL) 5 MG tablet; Take 1 tablet by mouth 2 times daily  Dispense: 60 tablet; Refill: 3    2. Essential hypertension  Stable. Continue current medications. - lisinopril (PRINIVIL;ZESTRIL) 10 MG tablet; TAKE ONE TABLET BY MOUTH DAILY  Dispense: 90 tablet; Refill: 1    3. Mixed hyperlipidemia  Stable. Continue current medications. - atorvastatin (LIPITOR) 40 MG tablet; TAKE 1 TABLET BY MOUTH DAILY AT BEDTIME  Dispense: 90 tablet; Refill: 1    Return in about 6 months (around 8/10/2022) for Diabetes, Hypertension, Hyperlipidemia.

## 2022-02-11 LAB
A/G RATIO: 1.3 (ref 1.1–2.2)
ALBUMIN SERPL-MCNC: 4.4 G/DL (ref 3.4–5)
ALP BLD-CCNC: 86 U/L (ref 40–129)
ALT SERPL-CCNC: 43 U/L (ref 10–40)
ANION GAP SERPL CALCULATED.3IONS-SCNC: 17 MMOL/L (ref 3–16)
AST SERPL-CCNC: 23 U/L (ref 15–37)
BASOPHILS ABSOLUTE: 0 K/UL (ref 0–0.2)
BASOPHILS RELATIVE PERCENT: 0.7 %
BILIRUB SERPL-MCNC: 0.3 MG/DL (ref 0–1)
BUN BLDV-MCNC: 19 MG/DL (ref 7–20)
CALCIUM SERPL-MCNC: 9.9 MG/DL (ref 8.3–10.6)
CHLORIDE BLD-SCNC: 99 MMOL/L (ref 99–110)
CHOLESTEROL, TOTAL: 167 MG/DL (ref 0–199)
CO2: 21 MMOL/L (ref 21–32)
CREAT SERPL-MCNC: 0.8 MG/DL (ref 0.8–1.3)
EOSINOPHILS ABSOLUTE: 0.2 K/UL (ref 0–0.6)
EOSINOPHILS RELATIVE PERCENT: 2.6 %
ESTIMATED AVERAGE GLUCOSE: 162.8 MG/DL
FOLATE: 10.62 NG/ML (ref 4.78–24.2)
GFR AFRICAN AMERICAN: >60
GFR NON-AFRICAN AMERICAN: >60
GLUCOSE BLD-MCNC: 100 MG/DL (ref 70–99)
HBA1C MFR BLD: 7.3 %
HCT VFR BLD CALC: 44.6 % (ref 40.5–52.5)
HDLC SERPL-MCNC: 41 MG/DL (ref 40–60)
HEMOGLOBIN: 14.7 G/DL (ref 13.5–17.5)
LDL CHOLESTEROL CALCULATED: 108 MG/DL
LYMPHOCYTES ABSOLUTE: 1.5 K/UL (ref 1–5.1)
LYMPHOCYTES RELATIVE PERCENT: 26.6 %
MCH RBC QN AUTO: 28.7 PG (ref 26–34)
MCHC RBC AUTO-ENTMCNC: 32.9 G/DL (ref 31–36)
MCV RBC AUTO: 87.2 FL (ref 80–100)
MONOCYTES ABSOLUTE: 0.4 K/UL (ref 0–1.3)
MONOCYTES RELATIVE PERCENT: 6.8 %
NEUTROPHILS ABSOLUTE: 3.6 K/UL (ref 1.7–7.7)
NEUTROPHILS RELATIVE PERCENT: 63.3 %
PDW BLD-RTO: 13.6 % (ref 12.4–15.4)
PLATELET # BLD: 291 K/UL (ref 135–450)
PMV BLD AUTO: 7.3 FL (ref 5–10.5)
POTASSIUM SERPL-SCNC: 4.6 MMOL/L (ref 3.5–5.1)
PROSTATE SPECIFIC ANTIGEN: 3.55 NG/ML (ref 0–4)
RBC # BLD: 5.12 M/UL (ref 4.2–5.9)
SODIUM BLD-SCNC: 137 MMOL/L (ref 136–145)
T4 FREE: 1.3 NG/DL (ref 0.9–1.8)
TOTAL PROTEIN: 7.7 G/DL (ref 6.4–8.2)
TRIGL SERPL-MCNC: 91 MG/DL (ref 0–150)
TSH REFLEX: 6.31 UIU/ML (ref 0.27–4.2)
VITAMIN B-12: 301 PG/ML (ref 211–911)
VITAMIN D 25-HYDROXY: 22.3 NG/ML
VLDLC SERPL CALC-MCNC: 18 MG/DL
WBC # BLD: 5.7 K/UL (ref 4–11)

## 2022-03-02 NOTE — TELEPHONE ENCOUNTER
Refill Request     Last Seen: Last Seen Department: 2/10/2022  Last Seen by PCP: 2/10/2022    Last Written: 12/20/21 18 g 2 refills     Next Appointment: 8/12/22     Future Appointments   Date Time Provider Carmita Donahue   4/11/2022  2:30 PM SCHEDULE, [facility]   4/11/2022  2:30 PM DEMETRI Collins - RODNEY [facility]   8/12/2022  2:30 PM DO GIA Segovia  [place] - DYD       Future appointment scheduled      Requested Prescriptions     Pending Prescriptions Disp Refills    VENTOLIN  (90 Base) MCG/ACT inhaler [Pharmacy Med Name: VENTOLIN HFA 90 MCG INHALER] 18 each 2     Sig: INHALE 2 PUFFS INTO THE LUNGS 4 TIMES DAILY AS NEEDED FOR WHEEZING.

## 2022-03-19 DIAGNOSIS — E78.2 MIXED HYPERLIPIDEMIA: ICD-10-CM

## 2022-03-21 RX ORDER — ATORVASTATIN CALCIUM 40 MG/1
TABLET, FILM COATED ORAL
Qty: 90 TABLET | Refills: 1 | Status: SHIPPED | OUTPATIENT
Start: 2022-03-21 | End: 2022-08-12 | Stop reason: SDUPTHER

## 2022-03-21 NOTE — TELEPHONE ENCOUNTER
Refill Request     Last Seen: Last Seen Department: 2/10/2022  Last Seen by PCP: 2/10/2022    Last Written: 9/10/2021    Next Appointment:   Future Appointments   Date Time Provider Carmita Donahue   4/11/2022  2:30 PM [facility]   4/11/2022  2:30 PM DEMETRI Torre - CNP [facility] [address] [facility]   8/12/2022  2:30 PM DO GIA Segovia [place] - DYMANAV       Future appointment scheduled      Requested Prescriptions     Pending Prescriptions Disp Refills    atorvastatin (LIPITOR) 40 MG tablet [Pharmacy Med Name: ATORVASTATIN 40 MG TABLET] 90 tablet 1     Sig: TAKE 1 TABLET BY MOUTH EVERYDAY AT BEDTIME

## 2022-03-29 RX ORDER — DILTIAZEM HYDROCHLORIDE 240 MG/1
CAPSULE, COATED, EXTENDED RELEASE ORAL
Qty: 90 CAPSULE | Refills: 2 | Status: SHIPPED | OUTPATIENT
Start: 2022-03-29

## 2022-04-11 ENCOUNTER — OFFICE VISIT (OUTPATIENT)
Dept: CARDIOLOGY CLINIC | Age: 62
End: 2022-04-11
Payer: COMMERCIAL

## 2022-04-11 ENCOUNTER — NURSE ONLY (OUTPATIENT)
Dept: CARDIOLOGY CLINIC | Age: 62
End: 2022-04-11
Payer: COMMERCIAL

## 2022-04-11 VITALS
HEART RATE: 104 BPM | OXYGEN SATURATION: 98 % | HEIGHT: 76 IN | DIASTOLIC BLOOD PRESSURE: 70 MMHG | BODY MASS INDEX: 33.97 KG/M2 | SYSTOLIC BLOOD PRESSURE: 120 MMHG | WEIGHT: 279 LBS

## 2022-04-11 DIAGNOSIS — I48.0 PAF (PAROXYSMAL ATRIAL FIBRILLATION) (HCC): ICD-10-CM

## 2022-04-11 DIAGNOSIS — G45.1 TIA INVOLVING RIGHT INTERNAL CAROTID ARTERY: ICD-10-CM

## 2022-04-11 DIAGNOSIS — I47.1 PAT (PAROXYSMAL ATRIAL TACHYCARDIA) (HCC): ICD-10-CM

## 2022-04-11 DIAGNOSIS — I48.0 PAF (PAROXYSMAL ATRIAL FIBRILLATION) (HCC): Primary | ICD-10-CM

## 2022-04-11 DIAGNOSIS — I47.9 PAROXYSMAL TACHYCARDIA (HCC): ICD-10-CM

## 2022-04-11 DIAGNOSIS — Z45.09 ENCOUNTER FOR LOOP RECORDER CHECK: ICD-10-CM

## 2022-04-11 PROCEDURE — G8427 DOCREV CUR MEDS BY ELIG CLIN: HCPCS | Performed by: NURSE PRACTITIONER

## 2022-04-11 PROCEDURE — 93285 PRGRMG DEV EVAL SCRMS IP: CPT | Performed by: INTERNAL MEDICINE

## 2022-04-11 PROCEDURE — 99214 OFFICE O/P EST MOD 30 MIN: CPT | Performed by: NURSE PRACTITIONER

## 2022-04-11 PROCEDURE — 3017F COLORECTAL CA SCREEN DOC REV: CPT | Performed by: NURSE PRACTITIONER

## 2022-04-11 PROCEDURE — 1036F TOBACCO NON-USER: CPT | Performed by: NURSE PRACTITIONER

## 2022-04-11 PROCEDURE — G8417 CALC BMI ABV UP PARAM F/U: HCPCS | Performed by: NURSE PRACTITIONER

## 2022-04-11 NOTE — PROGRESS NOTES
Patient comes in for interrogation of their implanted loop recorder. Patient has a history of TIA, paroxysmal tachycardia, and pAF. Takes Cardizem and Xarelto. Patient's last device interrogation was on 1/18. Since last interrogation, tachy events recorded show ST w V rates 122-136 bpm.    Patient will see ELIUD Lucero Hopping in office today. See [employer] report under the Cardiology tab. We will follow the patient remotely.

## 2022-04-11 NOTE — PROGRESS NOTES
[facility]   Electrophysiology Outpatient Note              Date:  April 11, 2022  Patient name: Monico Bush  YOB: 1960    Primary Care physician: Marylu Georges DO    HISTORY OF PRESENT ILLNESS: The patient is a 59 y.o.  male with a history of AF, AFL, ST, TIA, CAD, HLD, DM and asthma. In 2/2020, he wore an event monitor that showed AF. In 3/2020, he wore a subsequent monitor that showed predominantly NSR with one epsiode of AT and no AF/AFL. In 6/2020, he was evaluated in the ED for TIA. CTA of head/neck showed moderate to severe stenosis of proximal M1 segment of right MCA. He has been followed by neurologist Francisco Herrera). In 9/2020, he was evaluated at [facility] for chest pain. Stress test was positive. He underwent LHC which showed nonobstructive CAD. Stress test was determined to be false positive. Echo showed an EF of 60-65%. He was tachycardic at the time and Flecainide was started. In 10/2020, he had a loop recorder implanted. In 9/2021, he had Covid-19. Today he is being seen for AF and AFL. EKG shows SR with a HR of 98 bpm. Device check shows multiple brief tachy events (ST/SVT) on 4/10/2022 and 4/11/2022, longest 36 minutes. He reports he had some anxiety yesterday and today as well surrounding office visit. He can feel when his HR is elevated. He is noncompliant with remote monitoring. He also complains of hair loss. He has noticed this since having Covid. Recent TSH 2/2022 was elevated and he is an upcoming appointment with PCP to discuss. Denies chest pain and dizziness. Past Medical History:   has a past medical history of A-fib ([address] Allergy to environmental factors, Asthma, Diabetes mellitus ([address] HTN (hypertension), Mixed hyperlipidemia, and TIA (transient ischemic attack). Past Surgical History:   has a past surgical history that includes External ear surgery; Foot surgery; Tonsillectomy;  Adenoidectomy; and Insertable Cardiac Monitor (Left, 10/16/2020). Home Medications:    Prior to Admission medications    Medication Sig Start Date End Date Taking? Authorizing Provider   Cyanocobalamin (VITAMIN B 12 PO) Take 1 capsule by mouth daily Indications: 1,000 mg   Yes Historical Provider, MD   dilTIAZem (CARDIZEM CD) 240 MG extended release capsule TAKE 1 CAPSULE BY MOUTH EVERY DAY 3/29/22  Yes DEMETRI Lozada CNP   atorvastatin (LIPITOR) 40 MG tablet TAKE 1 TABLET BY MOUTH EVERYDAY AT BEDTIME 3/21/22  Yes Marlyn Stark DO   VENTOLIN  (90 Base) MCG/ACT inhaler INHALE 2 PUFFS INTO THE LUNGS 4 TIMES DAILY AS NEEDED FOR WHEEZING. 3/3/22  Yes Marlyn Stark DO   glipiZIDE (GLUCOTROL) 10 MG tablet TAKE 1 TABLET BY MOUTH TWICE A DAY BEFORE MEALS 2/10/22  Yes Romero Boykin DO   metFORMIN (GLUCOPHAGE) 500 MG tablet TAKE 2 TABLETS BY MOUTH TWICE A DAY WITH MEALS 2/10/22  Yes Marlyn Stark DO   TRULICITY 7.58 VA/7.4QZ SOPN INJECT 0.75 MG INTO THE SKIN ONCE A WEEK 1/25/22  Yes Marlyn Stark DO   lisinopril (PRINIVIL;ZESTRIL) 10 MG tablet TAKE 1 TABLET BY MOUTH EVERY DAY 1/2/22  Yes DEMETRI Cruz   XARELTO 20 MG TABS tablet TAKE 1 TABLET BY MOUTH EVERY DAY WITH BREAKFAST 10/11/21  Yes Mckinley Marquez MD   WIXELA INHUB 250-50 MCG/DOSE AEPB INHALE 1 PUFF INTO THE LUNGS EVERY 12 HOURS 9/13/21  Yes Romero Boykin,    Misc.  Devices (PULSE OXIMETER DELUXE) MISC 1 Device by Does not apply route daily 9/9/21  Yes LAKESHA Llamas   albuterol (PROVENTIL) (2.5 MG/3ML) 0.083% nebulizer solution Take 3 mLs by nebulization every 4 hours as needed for Wheezing 9/9/21  Yes LAKESHA Llamas   fluticasone Afia Redhead) 50 MCG/ACT nasal spray 2 sprays by Each Nostril route daily 3/4/20  Yes Ar Chase DO   aspirin 81 MG tablet Take 81 mg by mouth daily    Yes Historical Provider, MD   vitamin D (CHOLECALCIFEROL) 1000 UNIT TABS tablet Take 2,000 Units by mouth 2 times daily    Yes Historical Provider, MD   glucose blood VI test strips (ASCENSIA AUTODISC VI;ONE TOUCH ULTRA TEST VI) strip Test blood sugars twice daily 2/26/16  Yes Marlyn Stark DO   [facility] Check twice daily. Lancets approved by insurance company and patient choice 1/22/16  Yes Sahara Strange DO   ondansetron (ZOFRAN) 4 MG tablet TAKE 1 TABLET BY MOUTH 3 TIMES DAILY AS NEEDED FOR NAUSEA OR VOMITING  Patient not taking: Reported on 1/18/2022 11/18/21   Marlyn Stark DO   albuterol sulfate (PROAIR RESPICLICK) 194 (90 Base) MCG/ACT aerosol powder inhalation INHALE 2 PUFFS EVERY 6 HOURS AS NEEDED FOR WHEEZING/SHORTNESS OF BREATH  Patient not taking: Reported on 4/11/2022 10/21/21   DEMETRI Mo CNP   methocarbamol (ROBAXIN) 500 MG tablet Take 1 tablet by mouth 3 times daily as needed (pain)  Patient not taking: Reported on 9/9/2021 1/5/21   Marlyn La DO   fluticasone-vilanterol (BREO ELLIPTA) 100-25 MCG/INH AEPB inhaler Inhale 1 puff into the lungs daily  Patient not taking: Reported on 2/10/2022 6/8/20   Marlyn La DO       Allergies:  Penicillins and Codeine    Social History:   reports that he quit smoking about 6 years ago. His smoking use included cigars and cigarettes. He has a 15.00 pack-year smoking history. He has never used smokeless tobacco. He reports current alcohol use. He reports that he does not use drugs. Family History: family history includes Asthma in his brother; Cancer in his father; Diabetes in his maternal grandmother; Heart Disease in his maternal uncle and mother. All 14 point review of systems are completed and pertinent positives are mentioned in the history of present illness. Other systems are reviewed and are negative. PHYSICAL EXAM:    Vital signs:    /70   Pulse 108   Ht 6' 4\" (1.93 m)   Wt 279 lb (126.6 kg)   SpO2 98%   BMI 33.96 kg/m²      Constitutional and general appearance: alert, cooperative, no distress and appears stated age  HEENT: PERRL, no cervical lymphadenopathy. No masses palpable. Normal oral mucosa  Respiratory:  · Normal excursion and expansion without use of accessory muscles  · Resp auscultation: Normal breath sounds without wheezing, rhonchi, and rales  Cardiovascular:  · The apical impulse is not displaced  · Heart tones are crisp and normal. regular S1 and S2.  · Jugular venous pulsation Normal  · The carotid upstroke is normal in amplitude and contour without delay or bruit  · Peripheral pulses are symmetrical and full   Abdomen:  · No masses or tenderness  · Bowel sounds present  Extremities:  ·  No cyanosis or clubbing  ·  No lower extremity edema  ·  Skin: warm and dry  Neurological:  · Alert and oriented  · Moves all extremities well  · No abnormalities of mood, affect, memory, mentation, or behavior are noted    DATA:    Echo 8/4/2017:      Summary   Normal systolic function with an estimated ejection fraction of 60%. Mild concentric left ventricular hypertrophy. No regional wall motion abnormalities are seen. Grade I diastolic dysfunction with normal filing pressure. Cath 9/2020 ():  CORONARY ARTERIES:   The coronary circulation is left dominant. Left main:  Normal.   LAD:  Minor luminal irregularities.  Minor luminal irregularities. 1st diagonal:  Minor luminal irregularities. Left circumflex:  Minor luminal irregularities. 1st obtuse marginal:  Proximal vessel lesion: There is a 25%   stenosis. 2nd obtuse marginal:  Minor luminal irregularities. LCx posterolateral extension: Lesion: There is a 30% stenosis. Right coronary:  Normal.       All labs and testing reviewed. CARDIOLOGY LABS:   CBC: No results for input(s): WBC, HGB, HCT, PLT in the last 72 hours. BMP: No results for input(s): NA, K, CO2, BUN, CREATININE, LABGLOM, GLUCOSE in the last 72 hours. PT/INR: No results for input(s): PROTIME, INR in the last 72 hours. APTT:No results for input(s): APTT in the last 72 hours.   FASTING LIPID PANEL:  Lab Results   Component Value Date    HDL 41 02/10/2022    LDLCALC 108 02/10/2022    TRIG 91 02/10/2022     LIVER PROFILE:No results for input(s): AST, ALT, ALB in the last 72 hours.     IMPRESSION:    Assessment:   Paroxsymal atrial arrhythmias (AT and AF): ongoing    -[id number] score 4 (HTN, TIA, DM)   -Flecainide not tolerated (fatigue)   Sinus tachycardia: ongoing, stable   S/P loop recorder implant 10/2020   -device check per HPI  CAD: stable   -s/p LHC 9/2020 that showed nonobstructive CAD   HTN: controlled   HLD  Asthma   History of TIA  DM   Subclinical hypothyroidism    -noted 7/2021     Plan:   Continue Cardizem and Xarelto   Start Lopressor 25 mg PO PRN for elevated HR > 120 bpm   Remote device transmissions  Annual labs due 9/2022  Increase physical activity to improve stamina   Follow up in 6 months     DEMETRI Leiva-CNP  [facility]  [phone]

## 2022-04-11 NOTE — PATIENT INSTRUCTIONS
Start as needed metoprolol for heart rates > 120 bpm at rest    Continue diltiazem and Xarelto    Assess loop recorded transmitter as we have not been receiving device transmissions. Call [employer] to trouble shoot if needed.      Increase physical activity to improve stamina     Follow up in 6 months

## 2022-04-11 NOTE — LETTER
[address] [place] [address] Cardiology - [address] [facility] [address] [address]  Phone: [phone]  Fax: [phone]    DEMETRI Garcia Mc - RODNEY    April 13, 2022     [address] [place] [address], [address] [facility]    Patient: Bj Loaiza   MR Number: [id number]   YOB: 1960   Date of Visit: 4/11/2022       Dear [address] [place] [address]: Thank you for referring Bj Loaiza to me for evaluation/treatment. Below are the relevant portions of my assessment and plan of care. If you have questions, please do not hesitate to call me. I look forward to following Adam Cain along with you.     Sincerely,      DEMETRI Cordero CNP

## 2022-04-26 RX ORDER — FLUTICASONE PROPIONATE 50 MCG
SPRAY, SUSPENSION (ML) NASAL
Qty: 3 EACH | Refills: 1 | Status: SHIPPED | OUTPATIENT
Start: 2022-04-26

## 2022-05-11 ENCOUNTER — NURSE ONLY (OUTPATIENT)
Dept: CARDIOLOGY CLINIC | Age: 62
End: 2022-05-11
Payer: COMMERCIAL

## 2022-05-11 DIAGNOSIS — G45.1 TIA INVOLVING RIGHT INTERNAL CAROTID ARTERY: ICD-10-CM

## 2022-05-11 DIAGNOSIS — Z45.09 ENCOUNTER FOR LOOP RECORDER CHECK: ICD-10-CM

## 2022-05-11 DIAGNOSIS — I47.9 PAROXYSMAL TACHYCARDIA (HCC): ICD-10-CM

## 2022-05-11 PROCEDURE — 93298 REM INTERROG DEV EVAL SCRMS: CPT | Performed by: INTERNAL MEDICINE

## 2022-05-11 PROCEDURE — G2066 INTER DEVC REMOTE 30D: HCPCS | Performed by: INTERNAL MEDICINE

## 2022-05-11 NOTE — PROGRESS NOTES
Remote transmission received for patients ILR. EP physician will review. See interrogation under the cardiology tab in the [facility] [address] field for more details. Will continue to monitor remotely. ILR to monitor for AF. Hx pAF, AT, TIA. (oac, cardizem cd). No AFib recorded to date of 11/10/21. SVT/ST recorded > 120 bpm, longest 11 min. No symptom activated events.

## 2022-05-29 NOTE — TELEPHONE ENCOUNTER
Refill Request     CONFIRM preferrred pharmacy with the patient. If Mail Order Rx - Pend for 90 day refill. Last Seen: Last Seen Department: 2/10/2022  Last Seen by PCP: 2/10/2022    Last Written: 3/3/2022    Next Appointment:   Future Appointments   Date Time Provider Carmita Donahue   6/15/2022  7:05 AM SCHEDULE, Alyssa Holstein REMOTE Maryellen Hipp [facility]   7/20/2022  7:05 AM SCHEDULE, Alyssa Holstein REMOTE TRANSMISSION Shelia Morones [facility]   8/12/2022  2:30 PM DO GIA Segovia  [place] - DYD   8/24/2022  7:05 AM SCHEDULE, Alyssa Holstein REMOTE Maryellen Hipp [facility]   9/28/2022  7:05 AM SCHEDULE, Alyssa Holstein REMOTE Maryellen Hipp [facility]   10/11/2022  2:30 PM SCHEDULE, [facility]   10/11/2022  2:30 PM Cheryl Hernandez, APRN - RODNEY [facility]   11/2/2022  7:05 AM SCHEDULE, Alyssa Holstein REMOTE Maryellen Hipp [facility]   12/7/2022  7:05 AM SCHEDULE, Alyssa Holstein REMOTE TRANSMISSION Shelia Morones [facility]             Requested Prescriptions     Pending Prescriptions Disp Refills    VENTOLIN  (90 Base) MCG/ACT inhaler [Pharmacy Med Name: VENTOLIN HFA 90 MCG INHALER] 18 each 2     Sig: INHALE 2 PUFFS INTO THE LUNGS 4 TIMES DAILY AS NEEDED FOR WHEEZING.

## 2022-06-15 ENCOUNTER — NURSE ONLY (OUTPATIENT)
Dept: CARDIOLOGY CLINIC | Age: 62
End: 2022-06-15

## 2022-06-17 NOTE — PROGRESS NOTES
Notice of Disconnected Monitor Disconnected Monitor: Snoozed - See Patient Details    Disconnected notification snoozed for 15 days on: 06-Jun-2022  Last communication occurred on:04-May-2022  (44 days ago)  My chart message sent 5/20/22    ILR to monitor for AF. Hx pAF, AT, TIA. (oac, cardizem cd). No AFib recorded to date of 11/10/21. Low Risk (score 7-11)

## 2022-07-20 DIAGNOSIS — E11.9 TYPE 2 DIABETES MELLITUS WITHOUT COMPLICATION, WITHOUT LONG-TERM CURRENT USE OF INSULIN (HCC): ICD-10-CM

## 2022-07-20 RX ORDER — DULAGLUTIDE 0.75 MG/.5ML
0.75 INJECTION, SOLUTION SUBCUTANEOUS WEEKLY
Qty: 5 PEN | Refills: 5 | Status: SHIPPED | OUTPATIENT
Start: 2022-07-20

## 2022-07-20 NOTE — TELEPHONE ENCOUNTER
Refill Request     CONFIRM preferrred pharmacy with the patient. If Mail Order Rx - Pend for 90 day refill.       Last Seen: Last Seen Department: 2/10/2022  Last Seen by PCP: 2/10/2022    Last Written: 01/25/2022 4 Pen 5 Refills    Next Appointment:   Future Appointments   Date Time Provider Carmita Donahue   8/12/2022  2:30 PM DO GIA Treadwell  [place] - DYD   8/24/2022  7:05 AM SCHEDULE, [facility]   9/28/2022  7:05 AM SCHEDULE, [facility]   10/11/2022  2:30 PM [facility]ULE, [facility]   10/11/2022  2:30 PM DEMETRI Orozco [facility]   11/2/2022  7:05 AM SCHEDULE, [facility]   12/7/2022  7:05 AM SCHEDULE, [facility]       Future appointment scheduled      Requested Prescriptions     Pending Prescriptions Disp Refills    TRULICITY 2.17 VE/5.2UT SOPN [[facility] AG/8.5 ML PEN]  5     Sig: INJECT 0.75 MG INTO THE SKIN ONCE A WEEK

## 2022-08-12 ENCOUNTER — TELEMEDICINE (OUTPATIENT)
Dept: FAMILY MEDICINE CLINIC | Age: 62
End: 2022-08-12
Payer: COMMERCIAL

## 2022-08-12 DIAGNOSIS — Z12.5 SCREENING PSA (PROSTATE SPECIFIC ANTIGEN): ICD-10-CM

## 2022-08-12 DIAGNOSIS — E11.9 TYPE 2 DIABETES MELLITUS WITHOUT COMPLICATION, WITHOUT LONG-TERM CURRENT USE OF INSULIN (HCC): Primary | ICD-10-CM

## 2022-08-12 DIAGNOSIS — I10 ESSENTIAL HYPERTENSION: ICD-10-CM

## 2022-08-12 DIAGNOSIS — E78.2 MIXED HYPERLIPIDEMIA: ICD-10-CM

## 2022-08-12 DIAGNOSIS — E55.9 VITAMIN D DEFICIENCY: ICD-10-CM

## 2022-08-12 PROCEDURE — G8428 CUR MEDS NOT DOCUMENT: HCPCS | Performed by: FAMILY MEDICINE

## 2022-08-12 PROCEDURE — 3051F HG A1C>EQUAL 7.0%<8.0%: CPT | Performed by: FAMILY MEDICINE

## 2022-08-12 PROCEDURE — 99214 OFFICE O/P EST MOD 30 MIN: CPT | Performed by: FAMILY MEDICINE

## 2022-08-12 PROCEDURE — 3017F COLORECTAL CA SCREEN DOC REV: CPT | Performed by: FAMILY MEDICINE

## 2022-08-12 PROCEDURE — 2022F DILAT RTA XM EVC RTNOPTHY: CPT | Performed by: FAMILY MEDICINE

## 2022-08-12 RX ORDER — GLIPIZIDE 10 MG/1
TABLET ORAL
Qty: 180 TABLET | Refills: 1 | Status: SHIPPED | OUTPATIENT
Start: 2022-08-12

## 2022-08-12 RX ORDER — LISINOPRIL 10 MG/1
TABLET ORAL
Qty: 90 TABLET | Refills: 1 | Status: SHIPPED | OUTPATIENT
Start: 2022-08-12

## 2022-08-12 RX ORDER — ATORVASTATIN CALCIUM 40 MG/1
TABLET, FILM COATED ORAL
Qty: 90 TABLET | Refills: 1 | Status: SHIPPED | OUTPATIENT
Start: 2022-08-12

## 2022-08-12 NOTE — PROGRESS NOTES
Heather Hahn is a 57 y.o. male    Chief Complaint   Patient presents with    Diabetes    Hypertension    Hyperlipidemia       HPI:    The patient was evaluated through a synchronous (real-time) audio-video encounter. The patient (or guardian if applicable) is aware that this is a billable service, which includes applicable co-pays. This Virtual Visit was conducted with patient's (and/or legal guardian's) consent. The visit was conducted pursuant to the emergency declaration under the [facility], [address] [place] [address] authority and the [employer] Act. Patient identification was verified, and a caregiver was present when appropriate. The patient was located at home. Provider was located at [facility] (Appt Dept): [address]  [address]. Diabetes  He presents for his follow-up diabetic visit. He has type 2 diabetes mellitus. His disease course has been stable. Pertinent negatives for diabetes include no polydipsia. Diabetic complications include heart disease. Risk factors for coronary artery disease include diabetes mellitus, hypertension, male sex and obesity. When asked about current treatments, none were reported. An ACE inhibitor/angiotensin II receptor blocker is not being taken. Hypertension  This is a chronic problem. The current episode started more than 1 year ago. The problem is unchanged. The problem is controlled. Risk factors for coronary artery disease include diabetes mellitus, male gender and obesity. Past treatments include ACE inhibitors and beta blockers. The current treatment provides significant improvement. There are no compliance problems. Hypertensive end-organ damage includes CAD/MI. There is no history of kidney disease. Hyperlipidemia  This is a chronic problem. The current episode started more than 1 year ago. The problem is controlled.  Recent lipid tests were extended release capsule TAKE 1 CAPSULE BY MOUTH EVERY DAY 90 capsule 2    albuterol sulfate (PROAIR RESPICLICK) 210 (90 Base) MCG/ACT aerosol powder inhalation INHALE 2 PUFFS EVERY 6 HOURS AS NEEDED FOR WHEEZING/SHORTNESS OF BREATH (Patient not taking: Reported on 4/11/2022) 1 each 11    XARELTO 20 MG TABS tablet TAKE 1 TABLET BY MOUTH EVERY DAY WITH BREAKFAST 30 tablet 11    WIXELA INHUB 250-50 MCG/DOSE AEPB INHALE 1 PUFF INTO THE LUNGS EVERY 12 HOURS 60 each 3    Misc. Devices (PULSE OXIMETER DELUXE) MISC 1 Device by Does not apply route daily 1 each 0    albuterol (PROVENTIL) (2.5 MG/3ML) 0.083% nebulizer solution Take 3 mLs by nebulization every 4 hours as needed for Wheezing 25 each 3    methocarbamol (ROBAXIN) 500 MG tablet Take 1 tablet by mouth 3 times daily as needed (pain) (Patient not taking: Reported on 9/9/2021) 30 tablet 1    fluticasone-vilanterol (BREO ELLIPTA) 100-25 MCG/INH AEPB inhaler Inhale 1 puff into the lungs daily (Patient not taking: Reported on 2/10/2022) 30 each 5    aspirin 81 MG tablet Take 81 mg by mouth daily       vitamin D (CHOLECALCIFEROL) 1000 UNIT TABS tablet Take 2,000 Units by mouth 2 times daily       glucose blood VI test strips (ASCENSIA AUTODISC VI;ONE TOUCH ULTRA TEST VI) strip Test blood sugars twice daily 100 each 99    [facility] ORIG[facility]AL [facility] Check twice daily. Lancets approved by insurance company and patient choice 100 each 3     No current facility-administered medications for this visit. Assessment:    1. Type 2 diabetes mellitus without complication, without long-term current use of insulin ([address]    2. Essential hypertension    3. Mixed hyperlipidemia    4. Vitamin D deficiency    5. Screening PSA (prostate specific antigen)          Plan:    1. Type 2 diabetes mellitus without complication, without long-term current use of insulin ([facility])  Stable. Continue current medications. - metFORMIN (GLUCOPHAGE) 500 MG tablet;  Take 2 tablets by mouth 2 times daily (with meals)  Dispense: 120 tablet; Refill: 5  - glipiZIDE (GLUCOTROL) 5 MG tablet; Take 1 tablet by mouth 2 times daily  Dispense: 60 tablet; Refill: 3    2. Essential hypertension  Stable. Continue current medications. - lisinopril (PRINIVIL;ZESTRIL) 10 MG tablet; TAKE ONE TABLET BY MOUTH DAILY  Dispense: 90 tablet; Refill: 1    3. Mixed hyperlipidemia  Stable. Continue current medications. - atorvastatin (LIPITOR) 40 MG tablet; TAKE 1 TABLET BY MOUTH DAILY AT BEDTIME  Dispense: 90 tablet; Refill: 1    Return in about 6 months (around 2/12/2023) for Diabetes, Hypertension, Hyperlipidemia.

## 2022-08-22 NOTE — TELEPHONE ENCOUNTER
Refill Request     CONFIRM preferrred pharmacy with the patient. If Mail Order Rx - Pend for 90 day refill. Last Seen: Last Seen Department: 8/12/2022  Last Seen by PCP: 8/12/2022    Last Written: 5/31/2022    Next Appointment:   Future Appointments   Date Time Provider Carmita Donahue   8/24/2022  7:05 AM SCHEDULE, [place] REMOTE Harini Formica [facility]   9/28/2022  7:05 AM SCHEDULE, [place] REMOTE Harini Formica [facility]   10/11/2022  2:30 PM SCHEDULE, [facility] [employer] [facility]   10/11/2022  2:30 PM DEMETRI Nichole - CNP [employer] [facility]   11/2/2022  7:05 AM SCHEDULE, [place] REMOTE Harini Formica [facility]   12/7/2022  7:05 AM SCHEDULE, [place] REMOTE TRANSMISSION [employer] [facility]   2/14/2023  1:00 PM DO GIA Segovia       Future appointment scheduled      Requested Prescriptions     Pending Prescriptions Disp Refills    VENTOLIN  (90 Base) MCG/ACT inhaler [Pharmacy Med Name: VENTOLIN HFA 90 MCG INHALER] 18 each 2     Sig: INHALE 2 PUFFS INTO THE LUNGS 4 TIMES DAILY AS NEEDED FOR WHEEZING.

## 2022-10-11 ENCOUNTER — OFFICE VISIT (OUTPATIENT)
Dept: CARDIOLOGY CLINIC | Age: 62
End: 2022-10-11
Payer: COMMERCIAL

## 2022-10-11 ENCOUNTER — NURSE ONLY (OUTPATIENT)
Dept: CARDIOLOGY CLINIC | Age: 62
End: 2022-10-11
Payer: COMMERCIAL

## 2022-10-11 VITALS
HEART RATE: 67 BPM | BODY MASS INDEX: 35.06 KG/M2 | SYSTOLIC BLOOD PRESSURE: 132 MMHG | DIASTOLIC BLOOD PRESSURE: 74 MMHG | HEIGHT: 76 IN | OXYGEN SATURATION: 97 % | WEIGHT: 287.9 LBS

## 2022-10-11 DIAGNOSIS — I47.9 PAROXYSMAL TACHYCARDIA (HCC): Primary | ICD-10-CM

## 2022-10-11 DIAGNOSIS — I48.0 PAF (PAROXYSMAL ATRIAL FIBRILLATION) (HCC): Primary | ICD-10-CM

## 2022-10-11 DIAGNOSIS — I48.0 PAF (PAROXYSMAL ATRIAL FIBRILLATION) (HCC): ICD-10-CM

## 2022-10-11 DIAGNOSIS — I10 ESSENTIAL HYPERTENSION: ICD-10-CM

## 2022-10-11 DIAGNOSIS — I47.1 PAT (PAROXYSMAL ATRIAL TACHYCARDIA) (HCC): ICD-10-CM

## 2022-10-11 DIAGNOSIS — Z45.09 ENCOUNTER FOR LOOP RECORDER CHECK: ICD-10-CM

## 2022-10-11 DIAGNOSIS — G45.1 TIA INVOLVING RIGHT INTERNAL CAROTID ARTERY: ICD-10-CM

## 2022-10-11 PROCEDURE — G8427 DOCREV CUR MEDS BY ELIG CLIN: HCPCS | Performed by: NURSE PRACTITIONER

## 2022-10-11 PROCEDURE — 3017F COLORECTAL CA SCREEN DOC REV: CPT | Performed by: NURSE PRACTITIONER

## 2022-10-11 PROCEDURE — 99214 OFFICE O/P EST MOD 30 MIN: CPT | Performed by: NURSE PRACTITIONER

## 2022-10-11 PROCEDURE — G8417 CALC BMI ABV UP PARAM F/U: HCPCS | Performed by: NURSE PRACTITIONER

## 2022-10-11 PROCEDURE — 93291 INTERROG DEV EVAL SCRMS IP: CPT | Performed by: INTERNAL MEDICINE

## 2022-10-11 PROCEDURE — 1036F TOBACCO NON-USER: CPT | Performed by: NURSE PRACTITIONER

## 2022-10-11 PROCEDURE — G8484 FLU IMMUNIZE NO ADMIN: HCPCS | Performed by: NURSE PRACTITIONER

## 2022-10-11 RX ORDER — METOPROLOL SUCCINATE 25 MG/1
25 TABLET, EXTENDED RELEASE ORAL DAILY
Qty: 90 TABLET | Refills: 3 | Status: SHIPPED | OUTPATIENT
Start: 2022-10-11

## 2022-10-11 NOTE — PROGRESS NOTES
Patient comes in for interrogation of their implanted loop recorder. Patient has a history of TIA, paroxysmal tachycardia, and pAF. Takes Cardizem and Xarelto. Patient's last device interrogation was on 6/9. Since last interrogation, tachy events recorded show ST with V rates ranging from 122-133 bpm.    Patients incision is clean and dry with all dressings removed from site. See Pace[employer] report under the Cardiology tab. We will follow the patient remotely.

## 2022-10-11 NOTE — PROGRESS NOTES
[facility]   Electrophysiology Outpatient Note              Date:  October 11, 2022  Patient name: Nieves Correa  YOB: 1960    Primary Care physician: Kirby Osborne DO    HISTORY OF PRESENT ILLNESS: The patient is a 57 y.o.  male with a history of AF, AFL, ST, TIA, CAD, HLD, DM and asthma. In 2/2020, he wore an event monitor that showed AF. In 3/2020, he wore a subsequent monitor that showed predominantly NSR with one epsiode of AT and no AF/AFL. In 6/2020, he was evaluated in the ED for TIA. CTA of head/neck showed moderate to severe stenosis of proximal M1 segment of right MCA. He has been followed by neurologist Alicia Starkey). In 9/2020, he was evaluated at [facility] for chest pain. Stress test was positive. He underwent LHC which showed nonobstructive CAD. Stress test was determined to be false positive. Echo showed an EF of 60-65%. He was tachycardic at the time and Flecainide was started. In 10/2020, he had a loop recorder implanted. In 9/2021, he had Covid-19. Today he is being seen for AF and AFL. He is feeling well. States that he is noticing improvement in his heart rate with the as needed Lopressor. Denies chest pain, palpitations dizziness or shortness of breath. He has been having trouble with his Internet at home and therefore remote monitoring has been interrupted. Device check today shows:   Brand: Sky[employer]     Normal function  Arrhythmias: brief ST  Battery life Good       Past Medical History:   has a past medical history of A-fib ([address] Allergy to environmental factors, Asthma, Diabetes mellitus ([address] HTN (hypertension), Mixed hyperlipidemia, and TIA (transient ischemic attack). Past Surgical History:   has a past surgical history that includes External ear surgery; Foot surgery; Tonsillectomy; Adenoidectomy; and Insertable Cardiac Monitor (Left, 10/16/2020).      Home Medications:    Prior to Admission medications    Medication Sig Start Date End Date Taking? Authorizing Provider   metoprolol tartrate (LOPRESSOR) 25 MG tablet TAKE 1 TABLET BY MOUTH 2 TIMES DAILY AS NEEDED (HEART RATES >120 BPM) 8/24/22  Yes DEMETRI Lozada CNP   VENTOLIN  (90 Base) MCG/ACT inhaler INHALE 2 PUFFS INTO THE LUNGS 4 TIMES DAILY AS NEEDED FOR WHEEZING. 8/22/22  Yes Marlyn Stark DO   atorvastatin (LIPITOR) 40 MG tablet TAKE 1 TABLET BY MOUTH EVERYDAY AT BEDTIME 8/12/22  Yes Marlyn Stark DO   glipiZIDE (GLUCOTROL) 10 MG tablet TAKE 1 TABLET BY MOUTH TWICE A DAY BEFORE MEALS 8/12/22  Yes Marlyn Stark DO   metFORMIN (GLUCOPHAGE) 500 MG tablet TAKE 2 TABLETS BY MOUTH TWICE A DAY WITH MEALS 8/12/22  Yes Marlyn Stark DO   lisinopril (PRINIVIL;ZESTRIL) 10 MG tablet TAKE 1 TABLET BY MOUTH EVERY DAY 8/12/22  Yes Marlyn Stark DO   TRULICITY 3.97 BW/5.7TR SOPN INJECT 0.75 MG INTO THE SKIN ONCE A WEEK 7/20/22  Yes [place] DEMETRI Riggs CNP   fluticasone (FLONASE) 50 MCG/ACT nasal spray SPRAY 2 SPRAYS INTO EACH NOSTRIL EVERY DAY 4/26/22  Yes Marlyn Stark DO   Cyanocobalamin (VITAMIN B 12 PO) Take 1 capsule by mouth daily Indications: 1,000 mg   Yes Historical Provider, MD   dilTIAZem (CARDIZEM CD) 240 MG extended release capsule TAKE 1 CAPSULE BY MOUTH EVERY DAY 3/29/22  Yes DEMETRI Lozada CNP   XARELTO 20 MG TABS tablet TAKE 1 TABLET BY MOUTH EVERY DAY WITH BREAKFAST 10/11/21  Yes MD ELVIS Martinez INHUB 250-50 MCG/DOSE AEPB INHALE 1 PUFF INTO THE LUNGS EVERY 12 HOURS 9/13/21  Yes Alicia Hooper,    Misc.  Devices (PULSE OXIMETER DELUXE) MISC 1 Device by Does not apply route daily 9/9/21  Yes LAKESHA Bolton   albuterol (PROVENTIL) (2.5 MG/3ML) 0.083% nebulizer solution Take 3 mLs by nebulization every 4 hours as needed for Wheezing 9/9/21  Yes LAKESHA Bolton   aspirin 81 MG tablet Take 81 mg by mouth daily    Yes Historical Provider, MD   vitamin D (CHOLECALCIFEROL) 1000 UNIT TABS tablet Take 2,000 Units by mouth 2 times daily    Yes Historical Provider, MD   glucose blood VI test strips (ASCENSIA AUTODISC VI;ONE TOUCH ULTRA TEST VI) strip Test blood sugars twice daily 2/26/16  Yes Marlyn Stark, DO   [facility] LANC[facility] ORIG[facility]AL [facility] [place] [facility] Check twice daily. Lancets approved by insurance company and patient choice 1/22/16  Yes Marlyn Stark,    albuterol sulfate (PROAIR RESPICLICK) 589 (90 Base) MCG/ACT aerosol powder inhalation INHALE 2 PUFFS EVERY 6 HOURS AS NEEDED FOR WHEEZING/SHORTNESS OF BREATH  Patient not taking: No sig reported 10/21/21   Rudy Fus, APRN - CNP   methocarbamol (ROBAXIN) 500 MG tablet Take 1 tablet by mouth 3 times daily as needed (pain)  Patient not taking: Reported on 9/9/2021 1/5/21   Marlyn Chambers Smoker, DO   fluticasone-vilanterol (BREO ELLIPTA) 100-25 MCG/INH AEPB inhaler Inhale 1 puff into the lungs daily  Patient not taking: Reported on 2/10/2022 6/8/20   Marlyn Chambers Smoker, DO       Allergies:  Penicillins and Codeine    Social History:   reports that he quit smoking about 6 years ago. His smoking use included cigars and cigarettes. He has a 15.00 pack-year smoking history. He has never used smokeless tobacco. He reports current alcohol use. He reports that he does not use drugs. Family History: family history includes Asthma in his brother; Cancer in his father; Diabetes in his maternal grandmother; Heart Disease in his maternal uncle and mother. All 14 point review of systems are completed and pertinent positives are mentioned in the history of present illness. Other systems are reviewed and are negative. PHYSICAL EXAM:    Vital signs:    /74   Pulse 67   Ht 6' 4\" (1.93 m)   Wt 287 lb 14.4 oz (130.6 kg)   SpO2 97%   BMI 35.04 kg/m²      Constitutional and general appearance: alert, cooperative, no distress and appears stated age  HEENT: PERRL, no cervical lymphadenopathy. No masses palpable.  Normal oral mucosa  Respiratory:  Normal excursion and expansion without use of accessory input(s): AST, ALT, ALB in the last 72 hours.     IMPRESSION:    Assessment:   Paroxsymal atrial arrhythmias (AT and AF): ongoing    -[id number] score 4 (HTN, TIA, DM)   -Flecainide not tolerated (fatigue)   Sinus tachycardia: ongoing, stable   S/P loop recorder implant 10/2020   -device check per HPI  CAD: stable   -s/p LHC 9/2020 that showed nonobstructive CAD   HTN: controlled   HLD  Asthma   History of TIA  DM     Plan:   Continue Cardizem and Xarelto   Start Toprol 25 mg p.o. daily for ongoing heart rate control  Remote device transmissions once Internet has been repaired  Annual labs due 2/2023  Follow up in 6 months     CHELSEA Luna [address] [facility], APRN-CNP  [address]  [phone]

## 2022-10-11 NOTE — PATIENT INSTRUCTIONS
Start metoprolol SUCCINATE (long acting) 25 mg once a day     Remote monitoring as scheduled when you get your Internet repaired     Follow up in 6 months

## 2022-10-18 DIAGNOSIS — I48.0 PAF (PAROXYSMAL ATRIAL FIBRILLATION) (HCC): ICD-10-CM

## 2022-10-18 RX ORDER — RIVAROXABAN 20 MG/1
TABLET, FILM COATED ORAL
Qty: 30 TABLET | Refills: 6 | Status: SHIPPED | OUTPATIENT
Start: 2022-10-18

## 2022-11-08 RX ORDER — ALBUTEROL SULFATE 90 UG/1
AEROSOL, METERED RESPIRATORY (INHALATION)
Qty: 18 EACH | Refills: 2 | Status: SHIPPED | OUTPATIENT
Start: 2022-11-08

## 2022-11-08 NOTE — TELEPHONE ENCOUNTER
Refill Request     CONFIRM preferrred pharmacy with the patient. If Mail Order Rx - Pend for 90 day refill. Last Seen: Last Seen Department: 8/12/2022  Last Seen by PCP: 8/12/2022    Last Written: 10/21/2021 1 each 11 refills     If no future appointment scheduled, route STAFF MESSAGE with patient name to the [facility] for scheduling. Next Appointment:   Future Appointments   Date Time Provider Carmita Donahue   12/7/2022  7:05 AM SCHEDULE, [place] REMOTE TRANSMISSION [facility]   2/14/2023  1:00 PM DO GIA Segovia  [place] - DYD   4/24/2023 11:00 AM SCHEDULE, [facility]   4/24/2023 11:00 AM Amy Daryle Ripple, APRN - CNP [facility]       Message sent to [address] [place] [address] to schedule appt with patient? NO      Requested Prescriptions     Pending Prescriptions Disp Refills    albuterol sulfate HFA (PROVENTIL;VENTOLIN;PROAIR) 108 (90 Base) MCG/ACT inhaler [Pharmacy Med Name: ALBUTEROL HFA (VENTOLIN) INH] 18 each 2     Sig: INHALE 2 PUFFS INTO THE LUNGS 4 TIMES DAILY AS NEEDED FOR WHEEZING.

## 2022-12-16 RX ORDER — DILTIAZEM HYDROCHLORIDE 240 MG/1
CAPSULE, COATED, EXTENDED RELEASE ORAL
Qty: 90 CAPSULE | Refills: 2 | Status: SHIPPED | OUTPATIENT
Start: 2022-12-16

## 2022-12-16 NOTE — TELEPHONE ENCOUNTER
Last ov 10/11/2022 MILIND  Upcoming ov 04/24/2023  Last ov 07/20/2021 PRAVIN REYNOSOG 04/2022    MILIND LUNA

## 2022-12-27 DIAGNOSIS — I48.0 PAF (PAROXYSMAL ATRIAL FIBRILLATION) (HCC): ICD-10-CM

## 2022-12-27 DIAGNOSIS — E78.2 MIXED HYPERLIPIDEMIA: ICD-10-CM

## 2022-12-27 DIAGNOSIS — I10 ESSENTIAL HYPERTENSION: ICD-10-CM

## 2022-12-27 DIAGNOSIS — E11.9 TYPE 2 DIABETES MELLITUS WITHOUT COMPLICATION, WITHOUT LONG-TERM CURRENT USE OF INSULIN (HCC): ICD-10-CM

## 2022-12-27 RX ORDER — ALBUTEROL SULFATE 90 UG/1
AEROSOL, METERED RESPIRATORY (INHALATION)
Qty: 18 EACH | Refills: 2 | Status: SHIPPED | OUTPATIENT
Start: 2022-12-27

## 2022-12-27 RX ORDER — LISINOPRIL 10 MG/1
TABLET ORAL
Qty: 90 TABLET | Refills: 1 | Status: SHIPPED | OUTPATIENT
Start: 2022-12-27

## 2022-12-27 RX ORDER — METOPROLOL SUCCINATE 25 MG/1
25 TABLET, EXTENDED RELEASE ORAL DAILY
Qty: 90 TABLET | Refills: 2 | Status: SHIPPED | OUTPATIENT
Start: 2022-12-27

## 2022-12-27 RX ORDER — DULAGLUTIDE 0.75 MG/.5ML
0.75 INJECTION, SOLUTION SUBCUTANEOUS WEEKLY
Qty: 5 ADJUSTABLE DOSE PRE-FILLED PEN SYRINGE | Refills: 0 | Status: SHIPPED | OUTPATIENT
Start: 2022-12-27

## 2022-12-27 RX ORDER — ALBUTEROL SULFATE 2.5 MG/3ML
2.5 SOLUTION RESPIRATORY (INHALATION) EVERY 4 HOURS PRN
Qty: 25 EACH | Refills: 3 | Status: SHIPPED | OUTPATIENT
Start: 2022-12-27

## 2022-12-27 RX ORDER — GLIPIZIDE 10 MG/1
TABLET ORAL
Qty: 180 TABLET | Refills: 1 | Status: SHIPPED | OUTPATIENT
Start: 2022-12-27

## 2022-12-27 RX ORDER — FLUTICASONE PROPIONATE AND SALMETEROL 250; 50 UG/1; UG/1
1 POWDER RESPIRATORY (INHALATION) EVERY 12 HOURS
Qty: 60 EACH | Refills: 3 | Status: SHIPPED | OUTPATIENT
Start: 2022-12-27

## 2022-12-27 RX ORDER — ATORVASTATIN CALCIUM 40 MG/1
TABLET, FILM COATED ORAL
Qty: 90 TABLET | Refills: 1 | Status: SHIPPED | OUTPATIENT
Start: 2022-12-27

## 2022-12-27 NOTE — TELEPHONE ENCOUNTER
Refill Request     CONFIRM preferrred pharmacy with the patient. If Mail Order Rx - Pend for 90 day refill. Last Seen: Last Seen Department: 8/12/2022  Last Seen by PCP: 8/12/2022    Last Written:   8/12/2022  If no future appointment scheduled, route STAFF MESSAGE with patient name to the [facility] for scheduling. Next Appointment:   Future Appointments   Date Time Provider Carmita Donahue   2/14/2023  1:00 PM DO GIA Segovia [place] - DYD   4/24/2023 11:00 AM SCHEDULE, [facility] Robledo [facility]   4/24/2023 11:00 AM Cheryl Hedrick, APRN - RODNEY [facility]       Message sent to [address] [place] [address] to schedule appt with patient? NO      Requested Prescriptions     Pending Prescriptions Disp Refills    metFORMIN (GLUCOPHAGE) 500 MG tablet 360 tablet 1     Sig: TAKE 2 TABLETS BY MOUTH TWICE A DAY WITH MEALS    lisinopril (PRINIVIL;ZESTRIL) 10 MG tablet 90 tablet 1     Sig: TAKE 1 TABLET BY MOUTH EVERY DAY    Dulaglutide (TRULICITY) 6.07 DL/8.4RI SOPN       Sig: Inject 0.75 mg into the skin once a week    albuterol sulfate HFA (PROVENTIL;VENTOLIN;PROAIR) 108 (90 Base) MCG/ACT inhaler 18 each 2     Sig: INHALE 2 PUFFS INTO THE LUNGS 4 TIMES DAILY AS NEEDED FOR WHEEZING. atorvastatin (LIPITOR) 40 MG tablet 90 tablet 1     Sig: TAKE 1 TABLET BY MOUTH EVERYDAY AT BEDTIME    glipiZIDE (GLUCOTROL) 10 MG tablet 180 tablet 1     Sig: TAKE 1 TABLET BY MOUTH TWICE A DAY BEFORE MEALS    fluticasone-salmeterol (WIXELA INHUB) 250-50 MCG/ACT AEPB diskus inhaler 60 each 3     Sig: Inhale 1 puff into the lungs in the morning and 1 puff in the evening.     albuterol (PROVENTIL) (2.5 MG/3ML) 0.083% nebulizer solution 25 each 3     Sig: Take 3 mLs by nebulization every 4 hours as needed for Wheezing

## 2022-12-27 NOTE — TELEPHONE ENCOUNTER
Cheryl Lopez, APRN - CNP  You 1 hour ago (1:14 PM)     AM  Refilled metoprolol and Eliquis.  Please clarify Cardizem, it appears it was filled last week

## 2022-12-28 RX ORDER — DILTIAZEM HYDROCHLORIDE 240 MG/1
CAPSULE, COATED, EXTENDED RELEASE ORAL
Qty: 90 CAPSULE | Refills: 3 | Status: SHIPPED | OUTPATIENT
Start: 2022-12-28

## 2022-12-28 NOTE — TELEPHONE ENCOUNTER
Pt returned phone call, pt stated he still needs the cardizem refilled and he is \"pretty sure\" it was not filled last week? Please advise.

## 2023-01-03 ENCOUNTER — TELEPHONE (OUTPATIENT)
Dept: FAMILY MEDICINE CLINIC | Age: 63
End: 2023-01-03

## 2023-01-03 NOTE — TELEPHONE ENCOUNTER
----- Message from [facility] sent at 1/3/2023 11:01 AM EST -----  Subject: Appointment Request    Reason for Call: Established Patient Appointment needed: Routine Pre-Op    QUESTIONS    Reason for appointment request? Available appointments did not meet   patient need     Additional Information for Provider? Patient is having cataract surgery on   his right eye on 01/10 at [place] [address]. He is needing a pre-op   by the end of this week but next appt shows 01/12.  Please reach out to   patient to try to get him scheduled as soon as possible.   ---------------------------------------------------------------------------  --------------  [id number]  [phone]; OK to leave message on voicemail  ---------------------------------------------------------------------------  --------------  SCRIPT ANSWERS  COVID Screen: [place]

## 2023-01-06 ENCOUNTER — OFFICE VISIT (OUTPATIENT)
Dept: FAMILY MEDICINE CLINIC | Age: 63
End: 2023-01-06
Payer: MEDICARE

## 2023-01-06 VITALS
WEIGHT: 284 LBS | DIASTOLIC BLOOD PRESSURE: 80 MMHG | BODY MASS INDEX: 34.57 KG/M2 | HEART RATE: 109 BPM | OXYGEN SATURATION: 98 % | SYSTOLIC BLOOD PRESSURE: 134 MMHG

## 2023-01-06 DIAGNOSIS — Z01.818 PREOP EXAMINATION: Primary | ICD-10-CM

## 2023-01-06 PROCEDURE — 3075F SYST BP GE 130 - 139MM HG: CPT | Performed by: NURSE PRACTITIONER

## 2023-01-06 PROCEDURE — 99214 OFFICE O/P EST MOD 30 MIN: CPT | Performed by: NURSE PRACTITIONER

## 2023-01-06 PROCEDURE — 3017F COLORECTAL CA SCREEN DOC REV: CPT | Performed by: NURSE PRACTITIONER

## 2023-01-06 PROCEDURE — G8417 CALC BMI ABV UP PARAM F/U: HCPCS | Performed by: NURSE PRACTITIONER

## 2023-01-06 PROCEDURE — 1036F TOBACCO NON-USER: CPT | Performed by: NURSE PRACTITIONER

## 2023-01-06 PROCEDURE — G8484 FLU IMMUNIZE NO ADMIN: HCPCS | Performed by: NURSE PRACTITIONER

## 2023-01-06 PROCEDURE — G8427 DOCREV CUR MEDS BY ELIG CLIN: HCPCS | Performed by: NURSE PRACTITIONER

## 2023-01-06 PROCEDURE — 3079F DIAST BP 80-89 MM HG: CPT | Performed by: NURSE PRACTITIONER

## 2023-01-06 ASSESSMENT — PATIENT HEALTH QUESTIONNAIRE - PHQ9
SUM OF ALL RESPONSES TO PHQ QUESTIONS 1-9: 0
9. THOUGHTS THAT YOU WOULD BE BETTER OFF DEAD, OR OF HURTING YOURSELF: 0
5. POOR APPETITE OR OVEREATING: 0
3. TROUBLE FALLING OR STAYING ASLEEP: 0
7. TROUBLE CONCENTRATING ON THINGS, SUCH AS READING THE NEWSPAPER OR WATCHING TELEVISION: 0
1. LITTLE INTEREST OR PLEASURE IN DOING THINGS: 0
4. FEELING TIRED OR HAVING LITTLE ENERGY: 0
10. IF YOU CHECKED OFF ANY PROBLEMS, HOW DIFFICULT HAVE THESE PROBLEMS MADE IT FOR YOU TO DO YOUR WORK, TAKE CARE OF THINGS AT HOME, OR GET ALONG WITH OTHER PEOPLE: 0
2. FEELING DOWN, DEPRESSED OR HOPELESS: 0
SUM OF ALL RESPONSES TO PHQ QUESTIONS 1-9: 0
6. FEELING BAD ABOUT YOURSELF - OR THAT YOU ARE A FAILURE OR HAVE LET YOURSELF OR YOUR FAMILY DOWN: 0
SUM OF ALL RESPONSES TO PHQ QUESTIONS 1-9: 0
SUM OF ALL RESPONSES TO PHQ QUESTIONS 1-9: 0
SUM OF ALL RESPONSES TO PHQ9 QUESTIONS 1 & 2: 0
8. MOVING OR SPEAKING SO SLOWLY THAT OTHER PEOPLE COULD HAVE NOTICED. OR THE OPPOSITE, BEING SO FIGETY OR RESTLESS THAT YOU HAVE BEEN MOVING AROUND A LOT MORE THAN USUAL: 0

## 2023-01-06 NOTE — PROGRESS NOTES
Preoperative Consultation      Vijay Restrepo  YOB: 1960    Date of Service:  1/6/2023    Vitals:    01/06/23 1557   BP: 134/80   Site: Right Upper Arm   Position: Sitting   Cuff Size: Large Adult   Pulse: (!) 109   SpO2: 98%   Weight: 284 lb (128.8 kg)      Wt Readings from Last 2 Encounters:   01/06/23 284 lb (128.8 kg)   10/11/22 287 lb 14.4 oz (130.6 kg)     BP Readings from Last 3 Encounters:   01/06/23 134/80   10/11/22 132/74   04/11/22 120/70        Chief Complaint   Patient presents with    Pre-op Exam     1/10/2023 Freddy Florian Right Eye Cataract surgery Topical/MAC anesthesia     Allergies   Allergen Reactions    Penicillins Shortness Of Breath    Codeine Hives     Outpatient Medications Marked as Taking for the 1/6/23 encounter (Office Visit) with DEMETRI Venegas CNP   Medication Sig Dispense Refill    dilTIAZem (CARDIZEM CD) 240 MG extended release capsule TAKE 1 CAPSULE BY MOUTH EVERY DAY 90 capsule 3    rivaroxaban (XARELTO) 20 MG TABS tablet TAKE 1 TABLET BY MOUTH EVERY DAY WITH BREAKFAST 90 tablet 3    metoprolol succinate (TOPROL XL) 25 MG extended release tablet Take 1 tablet by mouth daily 90 tablet 2    metFORMIN (GLUCOPHAGE) 500 MG tablet TAKE 2 TABLETS BY MOUTH TWICE A DAY WITH MEALS 360 tablet 1    lisinopril (PRINIVIL;ZESTRIL) 10 MG tablet TAKE 1 TABLET BY MOUTH EVERY DAY 90 tablet 1    Dulaglutide (TRULICITY) 2.37 FT/0.7OE SOPN Inject 0.75 mg into the skin once a week 5 Adjustable Dose Pre-filled Pen Syringe 0    albuterol sulfate HFA (PROVENTIL;VENTOLIN;PROAIR) 108 (90 Base) MCG/ACT inhaler INHALE 2 PUFFS INTO THE LUNGS 4 TIMES DAILY AS NEEDED FOR WHEEZING.  18 each 2    atorvastatin (LIPITOR) 40 MG tablet TAKE 1 TABLET BY MOUTH EVERYDAY AT BEDTIME 90 tablet 1    glipiZIDE (GLUCOTROL) 10 MG tablet TAKE 1 TABLET BY MOUTH TWICE A DAY BEFORE MEALS 180 tablet 1    fluticasone-salmeterol (WIXELA INHUB) 250-50 MCG/ACT AEPB diskus inhaler Inhale 1 puff into the lungs in the morning and 1 puff in the evening. 60 each 3    albuterol (PROVENTIL) (2.5 MG/3ML) 0.083% nebulizer solution Take 3 mLs by nebulization every 4 hours as needed for Wheezing 25 each 3    fluticasone (FLONASE) 50 MCG/ACT nasal spray SPRAY 2 SPRAYS INTO EACH NOSTRIL EVERY DAY 3 each 1    Cyanocobalamin (VITAMIN B 12 PO) Take 1 capsule by mouth daily Indications: 1,000 mg      WIXELA INHUB 250-50 MCG/DOSE AEPB INHALE 1 PUFF INTO THE LUNGS EVERY 12 HOURS 60 each 3    Misc. Devices (PULSE OXIMETER DELUXE) MISC 1 Device by Does not apply route daily 1 each 0    aspirin 81 MG tablet Take 81 mg by mouth daily       vitamin D (CHOLECALCIFEROL) 1000 UNIT TABS tablet Take 2,000 Units by mouth 2 times daily       glucose blood VI test strips (ASCENSIA AUTODISC VI;ONE TOUCH ULTRA TEST VI) strip Test blood sugars twice daily 100 each 99    [facility] LANCETS ORIGINAL MISC Check twice daily. Lancets approved by insurance company and patient choice 100 each 3       This patient presents to the office today for a preoperative consultation at the request of surgeon, Dr. Joseph Key, who plans on performing Right Eye Cataract surgery on January 10 at [place] [address]. The current problem began 7 months ago, and symptoms have been worsening with time. Conservative therapy: N/A.     Planned anesthesia: Topical anesthesia   Known anesthesia problems: None   Bleeding risk: No recent or remote history of abnormal bleeding  Personal or FH of DVT/PE: No    Patient objection to receiving blood products: No    Patient Active Problem List   Diagnosis    Asthma    Asthma exacerbation    Streptococcal pneumonia ([facility]    Smoker    Acute respiratory failure ([facility]    Essential hypertension    Mixed hyperlipidemia    Paroxysmal tachycardia (HCC)    PAF (paroxysmal atrial fibrillation) ([facility]    Type 2 diabetes mellitus without complication, without long-term current use of insulin (HCC)    Acute ischemic multifocal anterior circulation stroke (HCC)    TIA involving right internal carotid artery    DM type 2, controlled, with complication ([facility]    Hypertensive urgency    HTN (hypertension), benign    Dyslipidemia    medtronic LINQ 10/16/20 Dr Romana Catching       Past Medical History:   Diagnosis Date    A-fib [facility])     Allergy to environmental factors     Asthma     Diabetes mellitus ([facility]     HTN (hypertension) 2016    Mixed hyperlipidemia 2016    TIA (transient ischemic attack)      Past Surgical History:   Procedure Laterality Date    ADENOIDECTOMY      EXTERNAL EAR SURGERY      multiple ear surgeries    FOOT SURGERY      both    INSERTABLE CARDIAC MONITOR Left 10/16/2020    medtronic    TONSILLECTOMY       Family History   Problem Relation Age of Onset    Heart Disease Mother     Cancer Father     Asthma Brother     Heart Disease Maternal Uncle     Diabetes Maternal Grandmother      Social History     Socioeconomic History    Marital status:      Spouse name: Not on file    Number of children: Not on file    Years of education: Not on file    Highest education level: Not on file   Occupational History    Not on file   Tobacco Use    Smoking status: Former     Packs/day: 1.00     Years: 15.00     Pack years: 15.00     Types: Cigars, Cigarettes     Quit date: 2015     Years since quittin.0    Smokeless tobacco: Never   Vaping Use    Vaping Use: Never used   Substance and Sexual Activity    Alcohol use: Yes     Comment: 2-3 beers/month    Drug use: No    Sexual activity: Not Currently   Other Topics Concern    Not on file   Social History Narrative    Not on file     Social Determinants of Health     Financial Resource Strain: Low Risk     Difficulty of Paying Living Expenses: Not hard at all   Food Insecurity: No Food Insecurity    Worried About Running Out of Food in the Last Year: Never true    Ran Out of Food in the Last Year: Never true   Transportation Needs: Not on file   Physical Activity: Not on file   Stress: Not on file   Social Connections: Not on file   Intimate Partner Violence: Not on file   Housing Stability: Not on file       Review of Systems  A comprehensive review of systems was negative except for what was noted in the HPI. Physical Exam   Constitutional: He is oriented to person, place, and time. He appears well-developed and well-nourished. No distress. HENT:   Head: Normocephalic and atraumatic. Mouth/Throat: Uvula is midline, oropharynx is clear and moist and mucous membranes are normal.   Eyes: Conjunctivae and EOM are normal. Pupils are equal, round, and reactive to light. Neck: Trachea normal and normal range of motion. Neck supple. No JVD present. Carotid bruit is not present. No mass and no thyromegaly present. Cardiovascular: Normal rate, regular rhythm, normal heart sounds and intact distal pulses. Exam reveals no gallop and no friction rub. No murmur heard. Pulmonary/Chest: Effort normal and breath sounds normal. No respiratory distress. He has no wheezes. He has no rales. Abdominal: Soft. Bowel sounds are normal. He exhibits no distension and no mass. There is no hepatosplenomegaly. No tenderness. Musculoskeletal: He exhibits no edema and no tenderness. Neurological: He is alert and oriented to person, place, and time. He has normal strength. No cranial nerve deficit or sensory deficit. Coordination and gait normal.   Skin: Skin is warm and dry. No rash noted. No erythema. Psychiatric: He has a normal mood and affect.  His behavior is normal.     EKG Interpretation:  n/a    Lab Review   Lab Results   Component Value Date/Time     02/10/2022 03:54 PM    K 4.6 02/10/2022 03:54 PM    K 4.2 06/08/2020 05:23 PM    CL 99 02/10/2022 03:54 PM    CO2 21 02/10/2022 03:54 PM    BUN 19 02/10/2022 03:54 PM    CREATININE 0.8 02/10/2022 03:54 PM    GLUCOSE 100 02/10/2022 03:54 PM    CALCIUM 9.9 02/10/2022 03:54 PM     Lab Results   Component Value Date/Time    CKTOTAL 35 06/09/2020 12:45 PM    TROPONINI <0.01 06/08/2020 05:23 PM     Lab Results   Component Value Date/Time    WBC 5.7 02/10/2022 03:54 PM    HGB 14.7 02/10/2022 03:54 PM    HCT 44.6 02/10/2022 03:54 PM    MCV 87.2 02/10/2022 03:54 PM     02/10/2022 03:54 PM     Lab Results   Component Value Date/Time    CHOL 167 02/10/2022 03:54 PM    TRIG 91 02/10/2022 03:54 PM    HDL 41 02/10/2022 03:54 PM           Assessment:       57 y.o. patient with planned surgery as above. Known risk factors for perioperative complications: Arrhythmia, Asthma, Diabetes mellitus, Hypertension, former smoker-quit 20 yrs ago, TIA, CVA, hld    Current medications which may produce withdrawal symptoms if withheld perioperatively: metoprolol     Plan:     1. Preoperative workup as follows: none  2. Change in medication regimen before surgery: Hold all medications on morning of surgery  3. Prophylaxis for cardiac events with perioperative beta-blockers: Currently taking  metoprolol  ACC/AHA indications for pre-operative beta-blocker use:    Vascular surgery with history of postitive stress test  Intermediate or high risk surgery with history of CAD   Intermediate or high risk surgery with multiple clinical predictors of CAD- 2 of the following: history of compensated or prior heart failure, history of cerebrovascular disease, DM, or renal insufficiency    Routine administration of higher-dose, long-acting metoprolol in beta-blocker-naïve patients on the day of surgery, and in the absence of dose titration is associated with an overall increase in mortality. Beta-blockers should be started days to weeks prior to surgery and titrated to pulse < 70.  4. Deep vein thrombosis prophylaxis: regimen to be chosen by surgical team  5.  No contraindications to planned surgery    Will fax to [phone]

## 2023-01-17 ENCOUNTER — TELEPHONE (OUTPATIENT)
Dept: FAMILY MEDICINE CLINIC | Age: 63
End: 2023-01-17

## 2023-01-17 DIAGNOSIS — E11.9 TYPE 2 DIABETES MELLITUS WITHOUT COMPLICATION, WITHOUT LONG-TERM CURRENT USE OF INSULIN (HCC): ICD-10-CM

## 2023-01-17 RX ORDER — ALBUTEROL SULFATE 90 UG/1
2 AEROSOL, METERED RESPIRATORY (INHALATION) EVERY 6 HOURS PRN
Qty: 18 G | Refills: 3 | Status: SHIPPED | OUTPATIENT
Start: 2023-01-17 | End: 2023-01-17 | Stop reason: SDUPTHER

## 2023-01-17 RX ORDER — ALBUTEROL SULFATE 90 UG/1
2 AEROSOL, METERED RESPIRATORY (INHALATION) EVERY 6 HOURS PRN
Qty: 18 G | Refills: 3 | Status: SHIPPED | OUTPATIENT
Start: 2023-01-17

## 2023-01-17 RX ORDER — GLIPIZIDE 10 MG/1
TABLET ORAL
Qty: 180 TABLET | Refills: 1 | Status: SHIPPED | OUTPATIENT
Start: 2023-01-17

## 2023-01-17 NOTE — TELEPHONE ENCOUNTER
Refill Request     CONFIRM preferrred pharmacy with the patient. If Mail Order Rx - Pend for 90 day refill. Last Seen: Last Seen Department: 1/6/2023  Last Seen by PCP: 8/12/2022    Last Written: 12/27/2022- patient changing pharmacy- pended to requested one     If no future appointment scheduled, route STAFF MESSAGE with patient name to the [facility] for scheduling. Next Appointment:   Future Appointments   Date Time Provider Carmita Donahue   2/14/2023  1:00 PM DO GIA Segovia  [place] - DYMANAV   4/24/2023 11:00 AM SCHEDULE, [facility]   4/24/2023 11:00 AM DEMETRI Boyd - RODNEY [facility]       Message sent to [employer] to schedule appt with patient?   NO      Requested Prescriptions     Pending Prescriptions Disp Refills    glipiZIDE (GLUCOTROL) 10 MG tablet 180 tablet 1     Sig: TAKE 1 TABLET BY MOUTH TWICE A DAY BEFORE MEALS

## 2023-01-17 NOTE — TELEPHONE ENCOUNTER
I sent in the order again  to either fill Ventolin or ProAir depending on the patient's insurance. Can someone call the pharmacy to see if this went through and then notify the patient?

## 2023-01-17 NOTE — TELEPHONE ENCOUNTER
Did we clarify which pharmacy the patient wanted this sent to?   - Needs to be sent to [facility]

## 2023-02-27 ENCOUNTER — OFFICE VISIT (OUTPATIENT)
Dept: FAMILY MEDICINE CLINIC | Age: 63
End: 2023-02-27

## 2023-02-27 VITALS
BODY MASS INDEX: 33.96 KG/M2 | SYSTOLIC BLOOD PRESSURE: 126 MMHG | WEIGHT: 279 LBS | DIASTOLIC BLOOD PRESSURE: 74 MMHG | OXYGEN SATURATION: 99 % | HEART RATE: 104 BPM

## 2023-02-27 DIAGNOSIS — Z12.5 SCREENING PSA (PROSTATE SPECIFIC ANTIGEN): ICD-10-CM

## 2023-02-27 DIAGNOSIS — E78.2 MIXED HYPERLIPIDEMIA: ICD-10-CM

## 2023-02-27 DIAGNOSIS — E55.9 VITAMIN D DEFICIENCY: ICD-10-CM

## 2023-02-27 DIAGNOSIS — E11.9 TYPE 2 DIABETES MELLITUS WITHOUT COMPLICATION, WITHOUT LONG-TERM CURRENT USE OF INSULIN (HCC): Primary | ICD-10-CM

## 2023-02-27 DIAGNOSIS — Z12.11 SCREENING FOR COLON CANCER: ICD-10-CM

## 2023-02-27 DIAGNOSIS — I47.1 PAT (PAROXYSMAL ATRIAL TACHYCARDIA) (HCC): ICD-10-CM

## 2023-02-27 DIAGNOSIS — I10 ESSENTIAL HYPERTENSION: ICD-10-CM

## 2023-02-27 LAB — HBA1C MFR BLD: 7.4 %

## 2023-02-27 NOTE — PROGRESS NOTES
Doreen Zuniga is a 57 y.o. male    Chief Complaint   Patient presents with    Diabetes     [facility] nurse came to home blood sugar     Hyperlipidemia    Hypertension       HPI:    Diabetes  He presents for his follow-up diabetic visit. He has type 2 diabetes mellitus. His disease course has been stable. Pertinent negatives for diabetes include no polydipsia. Diabetic complications include heart disease. Risk factors for coronary artery disease include diabetes mellitus, hypertension, male sex and obesity. When asked about current treatments, none were reported. An ACE inhibitor/angiotensin II receptor blocker is not being taken. Hypertension  This is a chronic problem. The current episode started more than 1 year ago. The problem is unchanged. The problem is controlled. Risk factors for coronary artery disease include diabetes mellitus, male gender and obesity. Past treatments include ACE inhibitors and beta blockers. The current treatment provides significant improvement. There are no compliance problems. Hypertensive end-organ damage includes CAD/MI. There is no history of kidney disease. Hyperlipidemia  This is a chronic problem. The current episode started more than 1 year ago. The problem is controlled. Recent lipid tests were reviewed and are low. Exacerbating diseases include diabetes. Pertinent negatives include no myalgias. Current antihyperlipidemic treatment includes statins. The current treatment provides moderate improvement of lipids. Compliance problems include medication cost.  Risk factors for coronary artery disease include hypertension, male sex, obesity and diabetes mellitus. ROS:    Review of Systems   Endocrine: Negative for polydipsia. Musculoskeletal:  Negative for myalgias. /74   Pulse (!) 104   Wt 279 lb (126.6 kg)   SpO2 99%   BMI 33.96 kg/m²     Physical Exam:    Physical Exam  Constitutional:       General: He is not in acute distress.      Appearance: Normal appearance. He is obese. He is not ill-appearing or toxic-appearing. HENT:      Head: Normocephalic. Neurological:      Mental Status: He is alert. Psychiatric:         Mood and Affect: Mood normal.         Behavior: Behavior normal.         Thought Content: Thought content normal.   Diabetic foot exam: sensation intact and equal bilaterally. Current Outpatient Medications   Medication Sig Dispense Refill    dulaglutide (TRULICITY) 1.5 KH/1.2QU SC injection Inject 0.5 mLs into the skin once a week 2 mL 5    albuterol sulfate HFA (PROVENTIL;VENTOLIN;PROAIR) 108 (90 Base) MCG/ACT inhaler Inhale 2 puffs into the lungs every 6 hours as needed for Shortness of Breath (may fill either Ventolin or Proair based on insurance.) 18 g 3    glipiZIDE (GLUCOTROL) 10 MG tablet TAKE 1 TABLET BY MOUTH TWICE A DAY BEFORE MEALS 180 tablet 1    dilTIAZem (CARDIZEM CD) 240 MG extended release capsule TAKE 1 CAPSULE BY MOUTH EVERY DAY 90 capsule 3    rivaroxaban (XARELTO) 20 MG TABS tablet TAKE 1 TABLET BY MOUTH EVERY DAY WITH BREAKFAST 90 tablet 3    metoprolol succinate (TOPROL XL) 25 MG extended release tablet Take 1 tablet by mouth daily 90 tablet 2    metFORMIN (GLUCOPHAGE) 500 MG tablet TAKE 2 TABLETS BY MOUTH TWICE A DAY WITH MEALS 360 tablet 1    lisinopril (PRINIVIL;ZESTRIL) 10 MG tablet TAKE 1 TABLET BY MOUTH EVERY DAY 90 tablet 1    albuterol sulfate HFA (PROVENTIL;VENTOLIN;PROAIR) 108 (90 Base) MCG/ACT inhaler INHALE 2 PUFFS INTO THE LUNGS 4 TIMES DAILY AS NEEDED FOR WHEEZING. 18 each 2    atorvastatin (LIPITOR) 40 MG tablet TAKE 1 TABLET BY MOUTH EVERYDAY AT BEDTIME 90 tablet 1    fluticasone-salmeterol (WIXELA INHUB) 250-50 MCG/ACT AEPB diskus inhaler Inhale 1 puff into the lungs in the morning and 1 puff in the evening.  60 each 3    albuterol (PROVENTIL) (2.5 MG/3ML) 0.083% nebulizer solution Take 3 mLs by nebulization every 4 hours as needed for Wheezing 25 each 3    fluticasone (FLONASE) 50 MCG/ACT nasal spray SPRAY 2 SPRAYS INTO EACH NOSTRIL EVERY DAY 3 each 1    Cyanocobalamin (VITAMIN B 12 PO) Take 1 capsule by mouth daily Indications: 1,000 mg      albuterol sulfate (PROAIR RESPICLICK) 336 (90 Base) MCG/ACT aerosol powder inhalation INHALE 2 PUFFS EVERY 6 HOURS AS NEEDED FOR WHEEZING/SHORTNESS OF BREATH (Patient not taking: No sig reported) 1 each 11    WIXELA INHUB 250-50 MCG/DOSE AEPB INHALE 1 PUFF INTO THE LUNGS EVERY 12 HOURS 60 each 3    Misc. Devices (PULSE OXIMETER DELUXE) MISC 1 Device by Does not apply route daily 1 each 0    aspirin 81 MG tablet Take 81 mg by mouth daily       vitamin D (CHOLECALCIFEROL) 1000 UNIT TABS tablet Take 2,000 Units by mouth 2 times daily       glucose blood VI test strips (ASCENSIA AUTODISC VI;ONE TOUCH ULTRA TEST VI) strip Test blood sugars twice daily 100 each 99    [employer] LANCETS ORIGINAL MISC Check twice daily. Lancets approved by insurance company and patient choice 100 each 3     No current facility-administered medications for this visit. Assessment:    1. Type 2 diabetes mellitus without complication, without long-term current use of insulin ([address]    2. Essential hypertension    3. Mixed hyperlipidemia    4. PAT (paroxysmal atrial tachycardia) ([address]    5. Vitamin D deficiency    6. Screening PSA (prostate specific antigen)    7. Screening for colon cancer          Plan:    1. Type 2 diabetes mellitus without complication, without long-term current use of insulin ([facility])  A1c is stable at 7.4. We will increase the Trulicity to help wean him off glipizide. - metFORMIN (GLUCOPHAGE) 500 MG tablet; Take 2 tablets by mouth 2 times daily (with meals)  Dispense: 120 tablet; Refill: 5  - glipiZIDE (GLUCOTROL) 5 MG tablet; Take 1 tablet by mouth 2 times daily  Dispense: 60 tablet; Refill: 3    2. Essential hypertension  Stable. Continue current medications. - lisinopril (PRINIVIL;ZESTRIL) 10 MG tablet; TAKE ONE TABLET BY MOUTH DAILY  Dispense: 90 tablet; Refill: 1    3.  Mixed hyperlipidemia  Stable. Continue current medications. - atorvastatin (LIPITOR) 40 MG tablet; TAKE 1 TABLET BY MOUTH DAILY AT BEDTIME  Dispense: 90 tablet; Refill: 1    PAT is stable. He sees cardiology yearly. Return in about 6 months (around 8/27/2023) for Diabetes, Hypertension, Hyperlipidemia + AWV.

## 2023-02-28 DIAGNOSIS — E03.9 ACQUIRED HYPOTHYROIDISM: Primary | ICD-10-CM

## 2023-02-28 DIAGNOSIS — R97.20 PSA ELEVATION: ICD-10-CM

## 2023-02-28 LAB
A/G RATIO: 1.5 (ref 1.1–2.2)
ALBUMIN SERPL-MCNC: 4.4 G/DL (ref 3.4–5)
ALP BLD-CCNC: 88 U/L (ref 40–129)
ALT SERPL-CCNC: 47 U/L (ref 10–40)
ANION GAP SERPL CALCULATED.3IONS-SCNC: 13 MMOL/L (ref 3–16)
AST SERPL-CCNC: 21 U/L (ref 15–37)
BASOPHILS ABSOLUTE: 0.2 K/UL (ref 0–0.2)
BASOPHILS RELATIVE PERCENT: 3 %
BILIRUB SERPL-MCNC: 0.3 MG/DL (ref 0–1)
BUN BLDV-MCNC: 18 MG/DL (ref 7–20)
CALCIUM SERPL-MCNC: 10.1 MG/DL (ref 8.3–10.6)
CHLORIDE BLD-SCNC: 100 MMOL/L (ref 99–110)
CHOLESTEROL, TOTAL: 156 MG/DL (ref 0–199)
CO2: 27 MMOL/L (ref 21–32)
CREAT SERPL-MCNC: 0.8 MG/DL (ref 0.8–1.3)
EOSINOPHILS ABSOLUTE: 0.2 K/UL (ref 0–0.6)
EOSINOPHILS RELATIVE PERCENT: 4 %
FOLATE: 9.38 NG/ML (ref 4.78–24.2)
GFR SERPL CREATININE-BSD FRML MDRD: >60 ML/MIN/{1.73_M2}
GLUCOSE BLD-MCNC: 187 MG/DL (ref 70–99)
HCT VFR BLD CALC: 44.9 % (ref 40.5–52.5)
HDLC SERPL-MCNC: 36 MG/DL (ref 40–60)
HEMATOLOGY PATH CONSULT: NO
HEMOGLOBIN: 14.9 G/DL (ref 13.5–17.5)
LDL CHOLESTEROL CALCULATED: 92 MG/DL
LYMPHOCYTES ABSOLUTE: 1.2 K/UL (ref 1–5.1)
LYMPHOCYTES RELATIVE PERCENT: 22 %
MCH RBC QN AUTO: 28.6 PG (ref 26–34)
MCHC RBC AUTO-ENTMCNC: 33.1 G/DL (ref 31–36)
MCV RBC AUTO: 86.2 FL (ref 80–100)
MONOCYTES ABSOLUTE: 0.3 K/UL (ref 0–1.3)
MONOCYTES RELATIVE PERCENT: 6 %
NEUTROPHILS ABSOLUTE: 3.6 K/UL (ref 1.7–7.7)
NEUTROPHILS RELATIVE PERCENT: 65 %
PDW BLD-RTO: 13.7 % (ref 12.4–15.4)
PLATELET # BLD: 295 K/UL (ref 135–450)
PMV BLD AUTO: 7.8 FL (ref 5–10.5)
POTASSIUM SERPL-SCNC: 4.9 MMOL/L (ref 3.5–5.1)
PROSTATE SPECIFIC ANTIGEN: 4.01 NG/ML (ref 0–4)
RBC # BLD: 5.21 M/UL (ref 4.2–5.9)
RBC # BLD: NORMAL 10*6/UL
SLIDE REVIEW: NORMAL
SODIUM BLD-SCNC: 140 MMOL/L (ref 136–145)
T4 FREE: 1.2 NG/DL (ref 0.9–1.8)
TOTAL PROTEIN: 7.4 G/DL (ref 6.4–8.2)
TRIGL SERPL-MCNC: 139 MG/DL (ref 0–150)
TSH REFLEX: 7.99 UIU/ML (ref 0.27–4.2)
VITAMIN B-12: 1660 PG/ML (ref 211–911)
VITAMIN D 25-HYDROXY: 37.6 NG/ML
VLDLC SERPL CALC-MCNC: 28 MG/DL
WBC # BLD: 5.5 K/UL (ref 4–11)

## 2023-02-28 RX ORDER — LEVOTHYROXINE SODIUM 0.05 MG/1
50 TABLET ORAL DAILY
Qty: 30 TABLET | Refills: 5 | Status: SHIPPED | OUTPATIENT
Start: 2023-02-28

## 2023-03-01 ENCOUNTER — TELEPHONE (OUTPATIENT)
Dept: CARDIOLOGY CLINIC | Age: 63
End: 2023-03-01

## 2023-03-30 ENCOUNTER — NURSE ONLY (OUTPATIENT)
Dept: CARDIOLOGY CLINIC | Age: 63
End: 2023-03-30

## 2023-03-30 NOTE — PROGRESS NOTES
End of 31-day monitoring period 3/30  ST recorded. Remote transmission received for patients ILR. EP physician will review. See interrogation under the cardiology tab in the [facility] [address] field for more details. Will continue to monitor remotely. ILR to monitor for AF. Hx pAF, AT, TIA. (oac, cardizem cd). No AFib recorded to date of 11/10/21. Tiffany Prescott

## 2023-05-24 ENCOUNTER — NURSE ONLY (OUTPATIENT)
Dept: CARDIOLOGY CLINIC | Age: 63
End: 2023-05-24
Payer: MEDICARE

## 2023-05-24 DIAGNOSIS — G45.1 TIA INVOLVING RIGHT INTERNAL CAROTID ARTERY: Primary | ICD-10-CM

## 2023-05-24 DIAGNOSIS — Z45.09 ENCOUNTER FOR LOOP RECORDER CHECK: ICD-10-CM

## 2023-05-24 PROCEDURE — 93298 REM INTERROG DEV EVAL SCRMS: CPT | Performed by: INTERNAL MEDICINE

## 2023-05-24 PROCEDURE — G2066 INTER DEVC REMOTE 30D: HCPCS | Performed by: INTERNAL MEDICINE

## 2023-05-24 NOTE — PROGRESS NOTES
We received a remote transmission from patient's monitor at home. Remote Linq report shows ST. EP physician to review. We will continue to monitor remotely. Implanted for AF, history of TIA. Pt is on Xarelto. End of 31-day monitoring period 5-24-23.

## 2023-06-28 ENCOUNTER — NURSE ONLY (OUTPATIENT)
Dept: CARDIOLOGY CLINIC | Age: 63
End: 2023-06-28
Payer: MEDICARE

## 2023-06-28 DIAGNOSIS — G45.1 TIA INVOLVING RIGHT INTERNAL CAROTID ARTERY: ICD-10-CM

## 2023-06-28 DIAGNOSIS — I48.0 PAF (PAROXYSMAL ATRIAL FIBRILLATION) (HCC): ICD-10-CM

## 2023-06-28 DIAGNOSIS — I47.9 PAROXYSMAL TACHYCARDIA (HCC): Primary | ICD-10-CM

## 2023-06-28 DIAGNOSIS — Z45.09 ENCOUNTER FOR LOOP RECORDER CHECK: ICD-10-CM

## 2023-06-28 PROCEDURE — 93298 REM INTERROG DEV EVAL SCRMS: CPT | Performed by: INTERNAL MEDICINE

## 2023-06-28 PROCEDURE — G2066 INTER DEVC REMOTE 30D: HCPCS | Performed by: INTERNAL MEDICINE

## 2023-07-22 DIAGNOSIS — E03.9 ACQUIRED HYPOTHYROIDISM: ICD-10-CM

## 2023-07-22 DIAGNOSIS — E11.9 TYPE 2 DIABETES MELLITUS WITHOUT COMPLICATION, WITHOUT LONG-TERM CURRENT USE OF INSULIN (HCC): ICD-10-CM

## 2023-07-22 NOTE — TELEPHONE ENCOUNTER
Refill Request     CONFIRM preferred pharmacy with the patient. If Mail Order Rx - Pend for 90 day refill. Last Seen: Last Seen Department: 2/27/2023  Last Seen by PCP: 2/27/2023    Last Written: 2/28/2023    If no future appointment scheduled:  Review the last OV with PCP and review information for follow-up visit,  Route STAFF MESSAGE with patient name to the [facility] for scheduling with the following information:            -  Timing of next visit           -  Visit type ie Physical, OV, etc           -  Diagnoses/Reason ie. COPD, HTN - Do not use MEDICATION, Follow-up or CHECK UP - Give reason for visit      Next Appointment:   Future Appointments   Date Time Provider [address]   8/2/2023  7:55 AM SCHEDULE, Kelsey English REMOTE TRANSMISSION Crystal Pavon [facility]   8/28/2023 10:00 AM DO GIA Segovia  [place] - DYD       Message sent to [employer] to schedule appt with patient?   NO      Requested Prescriptions     Pending Prescriptions Disp Refills    levothyroxine (SYNTHROID) 50 MCG tablet [Pharmacy Med Name: LEVOTHYROXINE 50 MCG TABLET] 90 tablet 1     Sig: TAKE 1 TABLET BY MOUTH EVERY DAY

## 2023-07-23 NOTE — TELEPHONE ENCOUNTER
.Refill Request     CONFIRM preferred pharmacy with the patient. If Mail Order Rx - Pend for 90 day refill. Last Seen: Last Seen Department: 2/27/2023  Last Seen by PCP: 2/27/2023    Last Written: 1/17/23 180 with 1     If no future appointment scheduled:  Review the last OV with PCP and review information for follow-up visit,  Route STAFF MESSAGE with patient name to the [facility] for scheduling with the following information:            -  Timing of next visit           -  Visit type ie Physical, OV, etc           -  Diagnoses/Reason ie. COPD, HTN - Do not use MEDICATION, Follow-up or CHECK UP - Give reason for visit      Next Appointment:   Future Appointments   Date Time Provider [address]   8/2/2023  7:55 AM SCHEDULE, Lawanda Stapleton REMOTE TRANSMISSION [facility]   8/28/2023 10:00 AM DO GIA Segovia  [place] - DYD       Message sent to [address] [place] [address] to schedule appt with patient?   N/A      Requested Prescriptions     Pending Prescriptions Disp Refills    glipiZIDE (GLUCOTROL) 10 MG tablet [Pharmacy Med Name: glipiZIDE 10 MG Oral Tablet] 180 tablet 3     Sig: TAKE 1 TABLET BY MOUTH TWICE  DAILY BEFORE MEALS

## 2023-07-24 RX ORDER — LEVOTHYROXINE SODIUM 0.05 MG/1
TABLET ORAL
Qty: 90 TABLET | Refills: 1 | Status: SHIPPED | OUTPATIENT
Start: 2023-07-24

## 2023-07-24 RX ORDER — GLIPIZIDE 10 MG/1
TABLET ORAL
Qty: 180 TABLET | Refills: 3 | Status: SHIPPED | OUTPATIENT
Start: 2023-07-24

## 2023-07-27 DIAGNOSIS — E78.2 MIXED HYPERLIPIDEMIA: ICD-10-CM

## 2023-07-27 DIAGNOSIS — I10 ESSENTIAL HYPERTENSION: ICD-10-CM

## 2023-07-27 RX ORDER — LISINOPRIL 10 MG/1
TABLET ORAL
Qty: 90 TABLET | Refills: 1 | Status: SHIPPED | OUTPATIENT
Start: 2023-07-27

## 2023-07-27 RX ORDER — ATORVASTATIN CALCIUM 40 MG/1
TABLET, FILM COATED ORAL
Qty: 90 TABLET | Refills: 1 | Status: SHIPPED | OUTPATIENT
Start: 2023-07-27

## 2023-07-27 NOTE — TELEPHONE ENCOUNTER
Refill Request     CONFIRM preferred pharmacy with the patient. If Mail Order Rx - Pend for 90 day refill. Last Seen: Last Seen Department: 2/27/2023  Last Seen by PCP: Visit date not found    Last Written:   Lisinopril-12/27/2022 90 tablet 1 refills   Atorvastatin-12/27/2022 90 tablet 1 refills       If no future appointment scheduled:  Review the last OV with PCP and review information for follow-up visit,  Route STAFF MESSAGE with patient name to the [facility] for scheduling with the following information:            -  Timing of next visit           -  Visit type ie Physical, OV, etc           -  Diagnoses/Reason ie. COPD, HTN - Do not use MEDICATION, Follow-up or CHECK UP - Give reason for visit      Next Appointment:   Future Appointments   Date Time Provider [facility] [address]   8/2/2023  7:55 AM SCHEDULE, Ishmael Grider REMOTE TRANSMISSION Brittany Philip [facility]   8/28/2023 10:00 AM DO GIA Segovia  [place] - DYD       Message sent to [address] [place] [address] to schedule appt with patient?   NO      Requested Prescriptions     Pending Prescriptions Disp Refills    lisinopril (PRINIVIL;ZESTRIL) 10 MG tablet 90 tablet 1     Sig: TAKE 1 TABLET BY MOUTH EVERY DAY    atorvastatin (LIPITOR) 40 MG tablet 90 tablet 1     Sig: TAKE 1 TABLET BY MOUTH EVERYDAY AT BEDTIME

## 2023-08-02 ENCOUNTER — NURSE ONLY (OUTPATIENT)
Dept: CARDIOLOGY CLINIC | Age: 63
End: 2023-08-02

## 2023-08-12 PROCEDURE — G2066 INTER DEVC REMOTE 30D: HCPCS | Performed by: INTERNAL MEDICINE

## 2023-08-12 PROCEDURE — 93298 REM INTERROG DEV EVAL SCRMS: CPT | Performed by: INTERNAL MEDICINE

## 2023-08-16 NOTE — PROGRESS NOTES
See Cecily Mares report under cardiology tab once EP reviews. ILR to monitor for AF. Hx pAF, AT, TIA. (Xarelto, toprol xl, cardizem cd).

## 2023-08-24 DIAGNOSIS — I48.0 PAF (PAROXYSMAL ATRIAL FIBRILLATION) (HCC): ICD-10-CM

## 2023-08-24 DIAGNOSIS — E11.9 TYPE 2 DIABETES MELLITUS WITHOUT COMPLICATION, WITHOUT LONG-TERM CURRENT USE OF INSULIN (HCC): ICD-10-CM

## 2023-08-24 NOTE — TELEPHONE ENCOUNTER
Refill Request     CONFIRM preferred pharmacy with the patient. If Mail Order Rx - Pend for 90 day refill. Last Seen: Last Seen Department: 2/27/2023  Last Seen by PCP: 2/27/2023    Last Written: 12/27/22    If no future appointment scheduled:  Review the last OV with PCP and review information for follow-up visit,  Route STAFF MESSAGE with patient name to the [facility] for scheduling with the following information:            -  Timing of next visit           -  Visit type ie Physical, OV, etc           -  Diagnoses/Reason ie. COPD, HTN - Do not use MEDICATION, Follow-up or CHECK UP - Give reason for visit      Next Appointment:   Future Appointments   Date Time Provider [facility] [address]   8/28/2023 10:00 AM [address] [facility], DO GIA Blas Azullo CORBIN       Message sent to [address] [place] [address] to schedule appt with patient?   NO      Requested Prescriptions     Pending Prescriptions Disp Refills    metFORMIN (GLUCOPHAGE) 500 MG tablet [Pharmacy Med Name: METFORMIN  MG TABLET] 360 tablet 1     Sig: TAKE 2 TABLETS BY MOUTH TWICE A DAY WITH MEALS

## 2023-08-25 DIAGNOSIS — E11.9 TYPE 2 DIABETES MELLITUS WITHOUT COMPLICATION, WITHOUT LONG-TERM CURRENT USE OF INSULIN (HCC): ICD-10-CM

## 2023-08-28 NOTE — TELEPHONE ENCOUNTER
Last ov 10/11/2022    Plan:   Continue Cardizem and Xarelto   Start Toprol 25 mg p.o. daily for ongoing heart rate control  Remote device transmissions once Internet has been repaired  Annual labs due 2/2023  Follow up in 6 months     CBC & CMP 02/2023      Front- pt needs appt

## 2023-09-05 RX ORDER — METOPROLOL SUCCINATE 25 MG/1
TABLET, EXTENDED RELEASE ORAL
Qty: 90 TABLET | Refills: 0 | Status: SHIPPED | OUTPATIENT
Start: 2023-09-05

## 2023-10-03 PROCEDURE — 93298 REM INTERROG DEV EVAL SCRMS: CPT | Performed by: INTERNAL MEDICINE

## 2023-10-03 PROCEDURE — G2066 INTER DEVC REMOTE 30D: HCPCS | Performed by: INTERNAL MEDICINE

## 2023-10-16 RX ORDER — ALBUTEROL SULFATE 90 UG/1
AEROSOL, METERED RESPIRATORY (INHALATION)
Qty: 18 EACH | Refills: 2 | Status: SHIPPED | OUTPATIENT
Start: 2023-10-16

## 2023-10-16 NOTE — TELEPHONE ENCOUNTER
Refill Request     CONFIRM preferred pharmacy with the patient. If Mail Order Rx - Pend for 90 day refill. Last Seen: Last Seen Department: 2/27/2023    Last Seen by PCP: 2/27/23    Last Written: 1/17/23 18g 3 refills    If no future appointment scheduled:  Review the last OV with PCP and review information for follow-up visit,  Route STAFF MESSAGE with patient name to the [facility] for scheduling with the following information:            -  Timing of next visit           -  Visit type ie Physical, OV, etc           -  Diagnoses/Reason ie. COPD, HTN - Do not use MEDICATION, Follow-up or CHECK UP - Give reason for visit      Next Appointment: n/a  Future Appointments   Date Time Provider [address]   12/18/2023 10:15 AM [facility]   12/18/2023 10:15 AM Cheryl Lopez APRN - RODNEY [facility]       Message sent to [address] [place] [address] to schedule appt with patient? NO      Requested Prescriptions     Pending Prescriptions Disp Refills    albuterol sulfate HFA (PROVENTIL;VENTOLIN;PROAIR) 108 (90 Base) MCG/ACT inhaler 18 each 2     Sig: INHALE 2 PUFFS INTO THE LUNGS 4 TIMES DAILY AS NEEDED FOR WHEEZING.

## 2023-10-23 DIAGNOSIS — E11.9 TYPE 2 DIABETES MELLITUS WITHOUT COMPLICATION, WITHOUT LONG-TERM CURRENT USE OF INSULIN (HCC): ICD-10-CM

## 2023-10-23 NOTE — TELEPHONE ENCOUNTER
Refill Request     CONFIRM preferred pharmacy with the patient. If Mail Order Rx - Pend for 90 day refill. Last Seen: Last Seen Department: 2/27/2023  Last Seen by PCP: 2/27/2023    Last Written: 2/27/23 2ml 5 refills     If no future appointment scheduled:  Review the last OV with PCP and review information for follow-up visit,  Route STAFF MESSAGE with patient name to the [facility] for scheduling with the following information:            -  Timing of next visit           -  Visit type ie Physical, OV, etc           -  Diagnoses/Reason ie. COPD, HTN - Do not use MEDICATION, Follow-up or CHECK UP - Give reason for visit      Next Appointment:   Future Appointments   Date Time Provider [address]   12/18/2023 10:15 AM [facility]   12/18/2023 10:15 AM Cheryl Lopez APRN - CNP [facility] Patient [facility]       Message sent to [employer] to schedule appt with patient?   NO      Requested Prescriptions     Pending Prescriptions Disp Refills    dulaglutide (TRULICITY) 1.5 RN/0.9KS SC injection 6 mL 1     Sig: Inject 0.5 mLs into the skin once a week

## 2023-11-01 DIAGNOSIS — J45.901 MODERATE ASTHMA WITH EXACERBATION, UNSPECIFIED WHETHER PERSISTENT: Primary | ICD-10-CM

## 2023-11-01 DIAGNOSIS — E03.9 ACQUIRED HYPOTHYROIDISM: ICD-10-CM

## 2023-11-01 RX ORDER — FLUTICASONE PROPIONATE AND SALMETEROL 250; 50 UG/1; UG/1
POWDER RESPIRATORY (INHALATION)
Qty: 60 EACH | Refills: 3 | OUTPATIENT
Start: 2023-11-01

## 2023-11-01 RX ORDER — FLUTICASONE PROPIONATE AND SALMETEROL 250; 50 UG/1; UG/1
1 POWDER RESPIRATORY (INHALATION) EVERY 12 HOURS
Qty: 60 EACH | Refills: 1 | Status: SHIPPED | OUTPATIENT
Start: 2023-11-01

## 2023-11-01 RX ORDER — LEVOTHYROXINE SODIUM 0.05 MG/1
TABLET ORAL
Qty: 90 TABLET | Refills: 1 | Status: SHIPPED | OUTPATIENT
Start: 2023-11-01

## 2023-11-01 NOTE — TELEPHONE ENCOUNTER
Refill Request     CONFIRM preferred pharmacy with the patient. If Mail Order Rx - Pend for 90 day refill. Last Seen: Last Seen Department: 2/27/2023  Last Seen by PCP: 2/27/2023    Last Written: 7/24/2023    If no future appointment scheduled:  Review the last OV with PCP and review information for follow-up visit,  Route STAFF MESSAGE with patient name to the [facility] for scheduling with the following information:            -  Timing of next visit           -  Visit type ie Physical, OV, etc           -  Diagnoses/Reason ie. COPD, HTN - Do not use MEDICATION, Follow-up or CHECK UP - Give reason for visit      Next Appointment:   Future Appointments   Date Time Provider [address]   12/18/2023 10:15 AM SCHEDULE, [facility] Deloris SANTOS   12/18/2023 10:15 AM [facility]       Message sent to [facility] to schedule appt with patient? YES- Return in about 6 months (around 8/27/2023) for Diabetes, Hypertension, Hyperlipidemia + AWV.       Requested Prescriptions     Pending Prescriptions Disp Refills    levothyroxine (SYNTHROID) 50 MCG tablet [Pharmacy Med Name: LEVOTHYROXINE 50 MCG TABLET] 90 tablet 1     Sig: TAKE 1 TABLET BY MOUTH EVERY DAY

## 2023-11-01 NOTE — TELEPHONE ENCOUNTER
Refill Request     CONFIRM preferred pharmacy with the patient.    If Mail Order Rx - Pend for 90 day refill.      Last Seen: Last Seen Department: 2/27/2023  Last Seen by PCP: Visit date not found    Last Written: 12/27/2023    If no future appointment scheduled:  Review the last OV with PCP and review information for follow-up visit,  Route STAFF MESSAGE with patient name to the  Pool for scheduling with the following information:            -  Timing of next visit           -  Visit type ie Physical, OV, etc           -  Diagnoses/Reason ie. COPD, HTN - Do not use MEDICATION, Follow-up or CHECK UP - Give reason for visit      Next Appointment:   Future Appointments   Date Time Provider Department Center   12/18/2023 10:15 AM SCHEDULE, [facility]   12/18/2023 10:15 AM Cheryl Lopez APRN - CNP [facility]       Message sent to  to schedule appt with patient?  YES      Requested Prescriptions     Pending Prescriptions Disp Refills    fluticasone-salmeterol (ADVAIR) 250-50 MCG/ACT AEPB diskus inhaler [Pharmacy Med Name: WIXELA 250-50 INHUB] 60 each 3     Sig: INHALE 1 PUFF INTO THE LUNGS IN THE MORNING AND IN THE EVENING

## 2023-11-01 NOTE — TELEPHONE ENCOUNTER
Refill Request     CONFIRM preferred pharmacy with the patient.    If Mail Order Rx - Pend for 90 day refill.      Last Seen: Last Seen Department: 2/27/2023  Last Seen by PCP: Visit date not found    Last Written: 12/27/2023    If no future appointment scheduled:  Review the last OV with PCP and review information for follow-up visit,  Route STAFF MESSAGE with patient name to the  Pool for scheduling with the following information:            -  Timing of next visit           -  Visit type ie Physical, OV, etc           -  Diagnoses/Reason ie. COPD, HTN - Do not use MEDICATION, Follow-up or CHECK UP - Give reason for visit      Next Appointment:   Future Appointments   Date Time Provider Department Center   12/18/2023 10:15 AM SCHEDULE, [facility]   12/18/2023 10:15 AM Cheryl Lopez APRN - CNP [facility]       Message sent to  to schedule appt with patient?  YES      Requested Prescriptions     Pending Prescriptions Disp Refills    fluticasone-salmeterol (WIXELA INHUB) 250-50 MCG/ACT AEPB diskus inhaler 60 each 3     Sig: Inhale 1 puff into the lungs in the morning and 1 puff in the evening.

## 2023-11-10 PROCEDURE — 93298 REM INTERROG DEV EVAL SCRMS: CPT | Performed by: INTERNAL MEDICINE

## 2023-11-10 PROCEDURE — G2066 INTER DEVC REMOTE 30D: HCPCS | Performed by: INTERNAL MEDICINE

## 2023-11-13 DIAGNOSIS — I10 ESSENTIAL HYPERTENSION: ICD-10-CM

## 2023-11-13 RX ORDER — LISINOPRIL 10 MG/1
TABLET ORAL
Qty: 90 TABLET | Refills: 1 | Status: SHIPPED | OUTPATIENT
Start: 2023-11-13

## 2023-11-13 NOTE — TELEPHONE ENCOUNTER
.Refill Request     CONFIRM preferred pharmacy with the patient. If Mail Order Rx - Pend for 90 day refill. Last Seen: Last Seen Department: 2/27/2023  Last Seen by PCP: 2/27/2023    Last Written: 7-27-23 90 with 1     If no future appointment scheduled:  Review the last OV with PCP and review information for follow-up visit,  Route STAFF MESSAGE with patient name to the [facility] for scheduling with the following information:            -  Timing of next visit           -  Visit type ie Physical, OV, etc           -  Diagnoses/Reason ie. COPD, HTN - Do not use MEDICATION, Follow-up or CHECK UP - Give reason for visit      Next Appointment:   Future Appointments   Date Time Provider [address]   11/16/2023  3:00 PM Marlyn Stark DO [facility]   12/18/2023 10:15 AM [facility]   12/18/2023 10:15 AM Cheryl Lopez APRN - RODNEY [facility]       Message sent to [address] [place] [address] to schedule appt with patient?   N/A      Requested Prescriptions     Pending Prescriptions Disp Refills    lisinopril (PRINIVIL;ZESTRIL) 10 MG tablet [Pharmacy Med Name: LISINOPRIL 10 MG TABLET] 90 tablet 1     Sig: TAKE 1 TABLET BY MOUTH EVERY DAY

## 2023-11-16 ENCOUNTER — OFFICE VISIT (OUTPATIENT)
Dept: FAMILY MEDICINE CLINIC | Age: 63
End: 2023-11-16

## 2023-11-16 VITALS
SYSTOLIC BLOOD PRESSURE: 136 MMHG | DIASTOLIC BLOOD PRESSURE: 62 MMHG | WEIGHT: 281 LBS | HEART RATE: 117 BPM | OXYGEN SATURATION: 99 % | BODY MASS INDEX: 34.2 KG/M2

## 2023-11-16 DIAGNOSIS — E78.2 MIXED HYPERLIPIDEMIA: ICD-10-CM

## 2023-11-16 DIAGNOSIS — Z00.00 WELCOME TO MEDICARE PREVENTIVE VISIT: Primary | ICD-10-CM

## 2023-11-16 DIAGNOSIS — Z11.59 NEED FOR HEPATITIS B SCREENING TEST: ICD-10-CM

## 2023-11-16 DIAGNOSIS — E55.9 VITAMIN D DEFICIENCY: ICD-10-CM

## 2023-11-16 DIAGNOSIS — I47.19 PAT (PAROXYSMAL ATRIAL TACHYCARDIA): ICD-10-CM

## 2023-11-16 DIAGNOSIS — E03.9 ACQUIRED HYPOTHYROIDISM: ICD-10-CM

## 2023-11-16 DIAGNOSIS — E11.9 TYPE 2 DIABETES MELLITUS WITHOUT COMPLICATION, WITHOUT LONG-TERM CURRENT USE OF INSULIN (HCC): ICD-10-CM

## 2023-11-16 DIAGNOSIS — Z13.6 SCREENING FOR AAA (ABDOMINAL AORTIC ANEURYSM): ICD-10-CM

## 2023-11-16 DIAGNOSIS — I10 ESSENTIAL HYPERTENSION: ICD-10-CM

## 2023-11-16 LAB
CREATININE URINE POCT: 300
HBA1C MFR BLD: 9.8 %
MICROALBUMIN/CREAT 24H UR: 10 MG/G{CREAT}
MICROALBUMIN/CREAT UR-RTO: NORMAL

## 2023-11-16 RX ORDER — TIRZEPATIDE 2.5 MG/.5ML
2.5 INJECTION, SOLUTION SUBCUTANEOUS WEEKLY
Qty: 2 ML | Refills: 5 | Status: SHIPPED | OUTPATIENT
Start: 2023-11-16

## 2023-11-16 ASSESSMENT — PATIENT HEALTH QUESTIONNAIRE - PHQ9
5. POOR APPETITE OR OVEREATING: 0
3. TROUBLE FALLING OR STAYING ASLEEP: 0
SUM OF ALL RESPONSES TO PHQ9 QUESTIONS 1 & 2: 2
SUM OF ALL RESPONSES TO PHQ QUESTIONS 1-9: 3
SUM OF ALL RESPONSES TO PHQ QUESTIONS 1-9: 3
9. THOUGHTS THAT YOU WOULD BE BETTER OFF DEAD, OR OF HURTING YOURSELF: 0
1. LITTLE INTEREST OR PLEASURE IN DOING THINGS: 2
SUM OF ALL RESPONSES TO PHQ QUESTIONS 1-9: 3
8. MOVING OR SPEAKING SO SLOWLY THAT OTHER PEOPLE COULD HAVE NOTICED. OR THE OPPOSITE, BEING SO FIGETY OR RESTLESS THAT YOU HAVE BEEN MOVING AROUND A LOT MORE THAN USUAL: 0
10. IF YOU CHECKED OFF ANY PROBLEMS, HOW DIFFICULT HAVE THESE PROBLEMS MADE IT FOR YOU TO DO YOUR WORK, TAKE CARE OF THINGS AT HOME, OR GET ALONG WITH OTHER PEOPLE: 0
6. FEELING BAD ABOUT YOURSELF - OR THAT YOU ARE A FAILURE OR HAVE LET YOURSELF OR YOUR FAMILY DOWN: 0
SUM OF ALL RESPONSES TO PHQ QUESTIONS 1-9: 3
4. FEELING TIRED OR HAVING LITTLE ENERGY: 1
2. FEELING DOWN, DEPRESSED OR HOPELESS: 0
7. TROUBLE CONCENTRATING ON THINGS, SUCH AS READING THE NEWSPAPER OR WATCHING TELEVISION: 0

## 2023-11-16 ASSESSMENT — LIFESTYLE VARIABLES
HOW MANY STANDARD DRINKS CONTAINING ALCOHOL DO YOU HAVE ON A TYPICAL DAY: 1 OR 2
HOW OFTEN DO YOU HAVE A DRINK CONTAINING ALCOHOL: MONTHLY OR LESS

## 2023-11-16 NOTE — PROGRESS NOTES
choice Yes Paulino Carlton DO       CareTeam (Including outside providers/suppliers regularly involved in providing care):   Patient Care Team:  Paulino Carlton DO as PCP - General (Family Medicine)  Paulino Carlton DO as PCP - Empaneled Provider     Reviewed and updated this visit:  Tobacco  Allergies  Meds  Med Hx  Surg Hx  Soc Hx  Fam Hx

## 2023-11-17 LAB
25(OH)D3 SERPL-MCNC: 35.5 NG/ML
ALBUMIN SERPL-MCNC: 4.2 G/DL (ref 3.4–5)
ALBUMIN/GLOB SERPL: 1.4 {RATIO} (ref 1.1–2.2)
ALP SERPL-CCNC: 90 U/L (ref 40–129)
ALT SERPL-CCNC: 49 U/L (ref 10–40)
ANION GAP SERPL CALCULATED.3IONS-SCNC: 17 MMOL/L (ref 3–16)
AST SERPL-CCNC: 24 U/L (ref 15–37)
BASOPHILS # BLD: 0.1 K/UL (ref 0–0.2)
BASOPHILS NFR BLD: 1 %
BILIRUB SERPL-MCNC: 0.4 MG/DL (ref 0–1)
BUN SERPL-MCNC: 20 MG/DL (ref 7–20)
CALCIUM SERPL-MCNC: 9.6 MG/DL (ref 8.3–10.6)
CHLORIDE SERPL-SCNC: 100 MMOL/L (ref 99–110)
CHOLEST SERPL-MCNC: 133 MG/DL (ref 0–199)
CO2 SERPL-SCNC: 21 MMOL/L (ref 21–32)
CREAT SERPL-MCNC: 0.7 MG/DL (ref 0.8–1.3)
DEPRECATED RDW RBC AUTO: 13.4 % (ref 12.4–15.4)
EOSINOPHIL # BLD: 0.1 K/UL (ref 0–0.6)
EOSINOPHIL NFR BLD: 1 %
GFR SERPLBLD CREATININE-BSD FMLA CKD-EPI: >60 ML/MIN/{1.73_M2}
GLUCOSE SERPL-MCNC: 84 MG/DL (ref 70–99)
HBV SURFACE AB SERPL IA-ACNC: <3.5 MIU/ML
HCT VFR BLD AUTO: 45.1 % (ref 40.5–52.5)
HDLC SERPL-MCNC: 36 MG/DL (ref 40–60)
HGB BLD-MCNC: 15 G/DL (ref 13.5–17.5)
LDLC SERPL CALC-MCNC: 82 MG/DL
LYMPHOCYTES # BLD: 2 K/UL (ref 1–5.1)
LYMPHOCYTES NFR BLD: 25 %
MCH RBC QN AUTO: 28.1 PG (ref 26–34)
MCHC RBC AUTO-ENTMCNC: 33.2 G/DL (ref 31–36)
MCV RBC AUTO: 84.6 FL (ref 80–100)
METAMYELOCYTES NFR BLD MANUAL: 1 %
MONOCYTES # BLD: 0.6 K/UL (ref 0–1.3)
MONOCYTES NFR BLD: 8 %
MYELOCYTES NFR BLD MANUAL: 1 %
NEUTROPHILS # BLD: 5.1 K/UL (ref 1.7–7.7)
NEUTROPHILS NFR BLD: 63 %
PLATELET # BLD AUTO: 343 K/UL (ref 135–450)
PLATELET BLD QL SMEAR: ADEQUATE
PMV BLD AUTO: 8 FL (ref 5–10.5)
POTASSIUM SERPL-SCNC: 4.6 MMOL/L (ref 3.5–5.1)
PROT SERPL-MCNC: 7.2 G/DL (ref 6.4–8.2)
RBC # BLD AUTO: 5.33 M/UL (ref 4.2–5.9)
RBC MORPH BLD: NORMAL
SLIDE REVIEW: ABNORMAL
SODIUM SERPL-SCNC: 138 MMOL/L (ref 136–145)
TRIGL SERPL-MCNC: 74 MG/DL (ref 0–150)
TSH SERPL DL<=0.005 MIU/L-ACNC: 4.1 UIU/ML (ref 0.27–4.2)
VLDLC SERPL CALC-MCNC: 15 MG/DL
WBC # BLD AUTO: 7.9 K/UL (ref 4–11)

## 2023-11-27 RX ORDER — METOPROLOL SUCCINATE 25 MG/1
TABLET, EXTENDED RELEASE ORAL
Qty: 90 TABLET | Refills: 3 | Status: SHIPPED | OUTPATIENT
Start: 2023-11-27

## 2023-12-03 DIAGNOSIS — E11.9 TYPE 2 DIABETES MELLITUS WITHOUT COMPLICATION, WITHOUT LONG-TERM CURRENT USE OF INSULIN (HCC): ICD-10-CM

## 2023-12-03 NOTE — TELEPHONE ENCOUNTER
Refill Request     CONFIRM preferred pharmacy with the patient. If Mail Order Rx - Pend for 90 day refill. Last Seen: Last Seen Department: 11/16/2023  Last Seen by PCP: 11/16/2023    Last Written: 8/25/2023    If no future appointment scheduled:  Review the last OV with PCP and review information for follow-up visit,  Route STAFF MESSAGE with patient name to the [facility] for scheduling with the following information:            -  Timing of next visit           -  Visit type ie Physical, OV, etc           -  Diagnoses/Reason ie. COPD, HTN - Do not use MEDICATION, Follow-up or CHECK UP - Give reason for visit      Next Appointment:   Future Appointments   Date Time Provider [address]   12/18/2023 10:15 AM SCHEDULE, [facility]   12/18/2023 10:15 AM Cheryl Duarte APRN - RODNEY [facility]   8/5/2024 10:00 AM Marlyn Stark DO [facility] [place] [facility]       Message sent to [facility] to schedule appt with patient?   NO      Requested Prescriptions     Pending Prescriptions Disp Refills    metFORMIN (GLUCOPHAGE) 500 MG tablet 360 tablet 1     Sig: TAKE 2 TABLETS BY MOUTH TWICE A DAY WITH MEALS

## 2023-12-04 RX ORDER — METOPROLOL SUCCINATE 25 MG/1
25 TABLET, EXTENDED RELEASE ORAL DAILY
Qty: 90 TABLET | Refills: 3 | Status: SHIPPED | OUTPATIENT
Start: 2023-12-04

## 2023-12-29 PROCEDURE — G2066 INTER DEVC REMOTE 30D: HCPCS | Performed by: INTERNAL MEDICINE

## 2023-12-29 PROCEDURE — 93298 REM INTERROG DEV EVAL SCRMS: CPT | Performed by: INTERNAL MEDICINE

## 2024-01-02 RX ORDER — ALBUTEROL SULFATE 90 UG/1
AEROSOL, METERED RESPIRATORY (INHALATION)
Qty: 8.5 EACH | Refills: 2 | Status: SHIPPED | OUTPATIENT
Start: 2024-01-02

## 2024-01-02 NOTE — TELEPHONE ENCOUNTER
Refill Request     CONFIRM preferred pharmacy with the patient.    If Mail Order Rx - Pend for 90 day refill.      Last Seen: Last Seen Department: 11/16/2023  Last Seen by PCP: 11/16/2023    Last Written: 10/16/23 18 each with 2 refills     If no future appointment scheduled:  Review the last OV with PCP and review information for follow-up visit,  Route STAFF MESSAGE with patient name to the  Pool for scheduling with the following information:            -  Timing of next visit           -  Visit type ie Physical, OV, etc           -  Diagnoses/Reason ie. COPD, HTN - Do not use MEDICATION, Follow-up or CHECK UP - Give reason for visit      Next Appointment:   Future Appointments   Date Time Provider Department Center   6/24/2024 10:00 AM SCHEDULE, [facility]   6/24/2024 10:00 AM Cheryl Lopez, APRN - CNP [facility]   8/5/2024 10:00 AM Marlyn Stark DO [facility] [place] [facility]       Message sent to  to schedule appt with patient?  NO      Requested Prescriptions     Pending Prescriptions Disp Refills    albuterol sulfate HFA (PROVENTIL;VENTOLIN;PROAIR) 108 (90 Base) MCG/ACT inhaler [Pharmacy Med Name: ALBUTEROL HFA (PROAIR) INHALER] 8.5 each 2     Sig: INHALE 2 PUFFS INTO THE LUNGS 4 TIMES DAILY AS NEEDED FOR WHEEZING.

## 2024-01-07 DIAGNOSIS — J45.901 MODERATE ASTHMA WITH EXACERBATION, UNSPECIFIED WHETHER PERSISTENT: ICD-10-CM

## 2024-01-07 NOTE — TELEPHONE ENCOUNTER
Refill Request     CONFIRM preferred pharmacy with the patient.    If Mail Order Rx - Pend for 90 day refill.      Last Seen: Last Seen Department: 11/16/2023  Last Seen by PCP: Visit date not found    Last Written: 11/1/2023 60 each 1 refills    If no future appointment scheduled:  Review the last OV with PCP and review information for follow-up visit,  Route STAFF MESSAGE with patient name to the  Pool for scheduling with the following information:            -  Timing of next visit           -  Visit type ie Physical, OV, etc           -  Diagnoses/Reason ie. COPD, HTN - Do not use MEDICATION, Follow-up or CHECK UP - Give reason for visit      Next Appointment:   Future Appointments   Date Time Provider Department Center   6/24/2024 10:00 AM SCHEDULE, [facility]   6/24/2024 10:00 AM Cheryl Lopez, DEMETRI - CNP [facility]   8/5/2024 10:00 AM Marlyn Stark DO [facility] [place] - [facility]       Message sent to  to schedule appt with patient?  NO      Requested Prescriptions     Pending Prescriptions Disp Refills    fluticasone-salmeterol (ADVAIR) 250-50 MCG/ACT AEPB diskus inhaler [Pharmacy Med Name: WIXELA 250-50 INHUB] 60 each 1     Sig: INHALE 1 PUFF INTO THE LUNGS IN THE MORNING AND IN THE EVENING

## 2024-01-08 RX ORDER — FLUTICASONE PROPIONATE AND SALMETEROL 250; 50 UG/1; UG/1
POWDER RESPIRATORY (INHALATION)
Qty: 60 EACH | Refills: 1 | Status: SHIPPED | OUTPATIENT
Start: 2024-01-08

## 2024-01-16 DIAGNOSIS — E11.9 TYPE 2 DIABETES MELLITUS WITHOUT COMPLICATION, WITHOUT LONG-TERM CURRENT USE OF INSULIN (HCC): ICD-10-CM

## 2024-01-18 DIAGNOSIS — E78.2 MIXED HYPERLIPIDEMIA: ICD-10-CM

## 2024-01-18 RX ORDER — ATORVASTATIN CALCIUM 40 MG/1
TABLET, FILM COATED ORAL
Qty: 90 TABLET | Refills: 1 | Status: SHIPPED | OUTPATIENT
Start: 2024-01-18

## 2024-01-18 RX ORDER — DULAGLUTIDE 1.5 MG/.5ML
INJECTION, SOLUTION SUBCUTANEOUS
Refills: 4 | OUTPATIENT
Start: 2024-01-18

## 2024-01-18 NOTE — TELEPHONE ENCOUNTER
Refill Request     CONFIRM preferred pharmacy with the patient.    If Mail Order Rx - Pend for 90 day refill.      Last Seen: Last Seen Department: 11/16/2023  Last Seen by PCP: 11/16/2023    Last Written: 7/27/2023, #90, 1 refill    If no future appointment scheduled:  Review the last OV with PCP and review information for follow-up visit,  Route STAFF MESSAGE with patient name to the  Pool for scheduling with the following information:            -  Timing of next visit           -  Visit type ie Physical, OV, etc           -  Diagnoses/Reason ie. COPD, HTN - Do not use MEDICATION, Follow-up or CHECK UP - Give reason for visit      Next Appointment:   Future Appointments   Date Time Provider Department Center   6/24/2024 10:00 AM SCHEDULE, [facility]   6/24/2024 10:00 AM Cheryl Lopez, APRN - CNP [facility]   8/5/2024 10:00 AM Marlyn Stark DO [facility] [place] [facility]       Message sent to  to schedule appt with patient?  N/A      Requested Prescriptions     Pending Prescriptions Disp Refills    atorvastatin (LIPITOR) 40 MG tablet [Pharmacy Med Name: ATORVASTATIN 40 MG TABLET] 90 tablet 1     Sig: TAKE 1 TABLET BY MOUTH EVERYDAY AT BEDTIME

## 2024-01-22 RX ORDER — DILTIAZEM HYDROCHLORIDE 240 MG/1
240 CAPSULE, COATED, EXTENDED RELEASE ORAL DAILY
Qty: 100 CAPSULE | Refills: 2 | Status: SHIPPED | OUTPATIENT
Start: 2024-01-22

## 2024-02-02 PROCEDURE — 93298 REM INTERROG DEV EVAL SCRMS: CPT | Performed by: INTERNAL MEDICINE

## 2024-02-06 ENCOUNTER — NURSE ONLY (OUTPATIENT)
Dept: CARDIOLOGY CLINIC | Age: 64
End: 2024-02-06

## 2024-02-06 NOTE — PROGRESS NOTES
LINDSAY battery RRT as of 1/23/2024    Pt currently in [place].  Will return to [place] in June.  He is scheduled to see NPAM 6/24/2024

## 2024-02-09 ENCOUNTER — TELEPHONE (OUTPATIENT)
Dept: CARDIOLOGY | Age: 64
End: 2024-02-09

## 2024-02-09 NOTE — TELEPHONE ENCOUNTER
Spoke with patient. He thinks he would like to replace device, but will be in [place] until June. Will discuss at scheduled appointment with NPAM.

## 2024-02-19 DIAGNOSIS — I48.0 PAF (PAROXYSMAL ATRIAL FIBRILLATION) (HCC): ICD-10-CM

## 2024-02-19 DIAGNOSIS — E11.9 TYPE 2 DIABETES MELLITUS WITHOUT COMPLICATION, WITHOUT LONG-TERM CURRENT USE OF INSULIN (HCC): ICD-10-CM

## 2024-02-19 RX ORDER — ALBUTEROL SULFATE 90 UG/1
2 AEROSOL, METERED RESPIRATORY (INHALATION) 4 TIMES DAILY PRN
Qty: 8.5 EACH | Refills: 2 | Status: SHIPPED | OUTPATIENT
Start: 2024-02-19

## 2024-02-19 RX ORDER — ALBUTEROL SULFATE 90 UG/1
2 AEROSOL, METERED RESPIRATORY (INHALATION) 4 TIMES DAILY PRN
Qty: 8.5 EACH | Refills: 1 | Status: SHIPPED | OUTPATIENT
Start: 2024-02-19

## 2024-02-19 NOTE — TELEPHONE ENCOUNTER
Refill Request     CONFIRM preferred pharmacy with the patient.    If Mail Order Rx - Pend for 90 day refill.      Last Seen: Last Seen Department: 11/16/2023  Last Seen by PCP: 11/16/2023    Last Written: 1/2/24 #8.5 refills 2    If no future appointment scheduled:  Review the last OV with PCP and review information for follow-up visit,  Route STAFF MESSAGE with patient name to the  Pool for scheduling with the following information:            -  Timing of next visit           -  Visit type ie Physical, OV, etc           -  Diagnoses/Reason ie. COPD, HTN - Do not use MEDICATION, Follow-up or CHECK UP - Give reason for visit      Next Appointment:   Future Appointments   Date Time Provider Department Center   6/24/2024 10:00 AM SCHEDULE, [facility]   6/24/2024 10:00 AM Cheryl Lopez, APRN - CNP [facility]   8/5/2024 10:00 AM Marlyn Stark DO [facility] [place] [facility]       Message sent to  to schedule appt with patient?  NO      Requested Prescriptions     Pending Prescriptions Disp Refills    albuterol sulfate HFA (PROVENTIL;VENTOLIN;PROAIR) 108 (90 Base) MCG/ACT inhaler [Pharmacy Med Name: ALBUTEROL HFA (PROAIR) INHALER] 8.5 each 1     Sig: INHALE 2 PUFFS INTO THE LUNGS 4 TIMES DAILY AS NEEDED FOR WHEEZING.

## 2024-02-19 NOTE — TELEPHONE ENCOUNTER
Refill Request     CONFIRM preferred pharmacy with the patient.    If Mail Order Rx - Pend for 90 day refill.      Last Seen: Last Seen Department: 11/16/2023  Last Seen by PCP: Visit date not found    Last Written: 12/04/2023 360 tab 1 refills     If no future appointment scheduled:  Review the last OV with PCP and review information for follow-up visit,  Route STAFF MESSAGE with patient name to the  Pool for scheduling with the following information:            -  Timing of next visit           -  Visit type ie Physical, OV, etc           -  Diagnoses/Reason ie. COPD, HTN - Do not use MEDICATION, Follow-up or CHECK UP - Give reason for visit      Next Appointment:   Future Appointments   Date Time Provider Department Center   6/24/2024 10:00 AM SCHEDULE, [facility]   6/24/2024 10:00 AM Cheryl Lopez, DEMETRI - CNP [facility]   8/5/2024 10:00 AM Marlyn Stark DO [facility] [place] [facility]       Message sent to  to schedule appt with patient?  NO      Requested Prescriptions     Pending Prescriptions Disp Refills    metFORMIN (GLUCOPHAGE) 500 MG tablet 360 tablet 1     Sig: TAKE 2 TABLETS BY MOUTH TWICE A DAY WITH MEALS

## 2024-02-19 NOTE — TELEPHONE ENCOUNTER
Refill Request     CONFIRM preferred pharmacy with the patient.    If Mail Order Rx - Pend for 90 day refill.      Last Seen: Last Seen Department: 11/16/2023  Last Seen by PCP: 11/16/2023    Last Written: 01/02/2024 8.5 each 2 refills     If no future appointment scheduled:  Review the last OV with PCP and review information for follow-up visit,  Route STAFF MESSAGE with patient name to the  Pool for scheduling with the following information:            -  Timing of next visit           -  Visit type ie Physical, OV, etc           -  Diagnoses/Reason ie. COPD, HTN - Do not use MEDICATION, Follow-up or CHECK UP - Give reason for visit      Next Appointment:   Future Appointments   Date Time Provider Department Center   6/24/2024 10:00 AM SCHEDULE, [facility]   6/24/2024 10:00 AM Cheryl Lopez, APRN - CNP [facility]   8/5/2024 10:00 AM Marlyn Stark DO [facility] [place] - [facility]       Message sent to  to schedule appt with patient?  NO      Requested Prescriptions     Pending Prescriptions Disp Refills    albuterol sulfate HFA (PROVENTIL;VENTOLIN;PROAIR) 108 (90 Base) MCG/ACT inhaler 8.5 each 2     Sig: Inhale 2 puffs into the lungs 4 times daily as needed for Wheezing for wheezing

## 2024-02-20 RX ORDER — METOPROLOL SUCCINATE 25 MG/1
25 TABLET, EXTENDED RELEASE ORAL DAILY
Qty: 90 TABLET | Refills: 2 | Status: SHIPPED | OUTPATIENT
Start: 2024-02-20

## 2024-03-18 RX ORDER — FLUTICASONE PROPIONATE AND SALMETEROL 250; 50 UG/1; UG/1
1 POWDER RESPIRATORY (INHALATION) EVERY 12 HOURS
Qty: 60 EACH | Refills: 3 | Status: SHIPPED | OUTPATIENT
Start: 2024-03-18

## 2024-03-18 RX ORDER — FLUTICASONE PROPIONATE AND SALMETEROL 250; 50 UG/1; UG/1
POWDER RESPIRATORY (INHALATION)
Qty: 60 EACH | Refills: 1 | OUTPATIENT
Start: 2024-03-18

## 2024-03-19 ENCOUNTER — PATIENT MESSAGE (OUTPATIENT)
Dept: CARDIOLOGY CLINIC | Age: 64
End: 2024-03-19

## 2024-03-19 NOTE — TELEPHONE ENCOUNTER
We received an alert on the remote monitor site for your cardiac device that your home transmitter has been disconnected since  2/29.   At your earliest convenience, please check your home monitor and send a manual transmission so we can ensure your cardiac device is functioning normally.  If you are having problems or have questions, please call the toll free number on your equipment or below for assistance.   SENDING A MANUAL TRANSMISSION FROM [employer] HOME MONITOR.   Follow these steps only for initial transmissions, or when requested by your doctor.   NotesFirst Patient Monitor Transmission Steps:    1. PRESS round GRAY button to get started.   2. LIFT reader when screen shows up arrow.   3. HOLD reader over your REVEAL LINQ device and wait for the GREEN BAR to progress.   4.RETURN reader to the base when screen shows down arrow.   5.CHECK for GREEN BAR progress showing your data is sending.   6.CONFIRM data sent when GREEN check cyndee appears.     IF QUESTIONS/CONCERNS or NEED ASSISTANCE WITH THIS PROCESS PLEASE CALL [employer] STAY CONNECTED 1 [phone]       Thank you.       [facility]

## 2024-04-24 DIAGNOSIS — E11.9 TYPE 2 DIABETES MELLITUS WITHOUT COMPLICATION, WITHOUT LONG-TERM CURRENT USE OF INSULIN (HCC): ICD-10-CM

## 2024-04-25 RX ORDER — TIRZEPATIDE 2.5 MG/.5ML
2.5 INJECTION, SOLUTION SUBCUTANEOUS WEEKLY
Qty: 6 ML | Refills: 3 | Status: SHIPPED | OUTPATIENT
Start: 2024-04-25

## 2024-04-25 RX ORDER — DILTIAZEM HYDROCHLORIDE 240 MG/1
CAPSULE, COATED, EXTENDED RELEASE ORAL DAILY
Qty: 90 CAPSULE | Refills: 2 | Status: SHIPPED | OUTPATIENT
Start: 2024-04-25

## 2024-04-25 NOTE — TELEPHONE ENCOUNTER
Refill Request     CONFIRM preferred pharmacy with the patient.    If Mail Order Rx - Pend for 90 day refill.      Last Seen: Last Seen Department: 11/16/2023  Last Seen by PCP: 11/16/2023    Last Written: 11/16/23 2 ml with 5 refills     If no future appointment scheduled:  Review the last OV with PCP and review information for follow-up visit,  Route STAFF MESSAGE with patient name to the  Pool for scheduling with the following information:            -  Timing of next visit           -  Visit type ie Physical, OV, etc           -  Diagnoses/Reason ie. COPD, HTN - Do not use MEDICATION, Follow-up or CHECK UP - Give reason for visit      Next Appointment:   Future Appointments   Date Time Provider Department Center   6/24/2024 10:00 AM SCHEDULE, [facility]   6/24/2024 10:00 AM Cheryl Lopez, DEM[facility]RI - CNP [facility]   8/5/2024 10:00 AM Marlyn Stark DO [facility] [place] [facility]       Message sent to  to schedule appt with patient?  NO      Requested Prescriptions     Pending Prescriptions Disp Refills    MOUNJARO 2.5 MG/0.5ML SOPN SC injection [Pharmacy Med Name: MOUNJARO 2.5 MG/0.5 ML PEN]  1     Sig: INJECT 0.5 ML SUBCUTANEOUSLY ONE TIME PER WEEK

## 2024-06-22 DIAGNOSIS — J45.901 MODERATE ASTHMA WITH EXACERBATION, UNSPECIFIED WHETHER PERSISTENT: Primary | ICD-10-CM

## 2024-06-22 NOTE — TELEPHONE ENCOUNTER
Refill Request     CONFIRM preferred pharmacy with the patient.    If Mail Order Rx - Pend for 90 day refill.      Last Seen: Last Seen Department: 11/16/2023  Last Seen by PCP: 11/16/2023    Last Written: 2/19/2024    If no future appointment scheduled:  Review the last OV with PCP and review information for follow-up visit,  Route STAFF MESSAGE with patient name to the  Pool for scheduling with the following information:            -  Timing of next visit           -  Visit type ie Physical, OV, etc           -  Diagnoses/Reason ie. COPD, HTN - Do not use MEDICATION, Follow-up or CHECK UP - Give reason for visit      Next Appointment:   Future Appointments   Date Time Provider Department Center   8/5/2024 10:00 AM Marlyn Stark DO [facility] [place] - [facility]   9/19/2024  3:30 PM SCHEDULE, [facility]   9/19/2024  3:30 PM Cheryl Lopez, APRN - CNP [facility]       Message sent to  to schedule appt with patient?  NO      Requested Prescriptions     Pending Prescriptions Disp Refills    albuterol sulfate HFA (PROVENTIL;VENTOLIN;PROAIR) 108 (90 Base) MCG/ACT inhaler [Pharmacy Med Name: ALBUTEROL HFA (PROAIR) INHALER] 8.5 each 1     Sig: INHALE 2 PUFFS INTO THE LUNGS 4 TIMES DAILY AS NEEDED FOR WHEEZING.

## 2024-06-24 RX ORDER — ALBUTEROL SULFATE 90 UG/1
2 AEROSOL, METERED RESPIRATORY (INHALATION) 4 TIMES DAILY PRN
Qty: 8.5 EACH | Refills: 1 | Status: SHIPPED | OUTPATIENT
Start: 2024-06-24

## 2024-07-20 DIAGNOSIS — E03.9 ACQUIRED HYPOTHYROIDISM: ICD-10-CM

## 2024-07-20 NOTE — TELEPHONE ENCOUNTER
Refill Request     CONFIRM preferred pharmacy with the patient.    If Mail Order Rx - Pend for 90 day refill.      Last Seen: Last Seen Department: 11/16/2023  Last Seen by PCP: 11/16/2023    Last Written: 11/1/2023    If no future appointment scheduled:  Review the last OV with PCP and review information for follow-up visit,  Route STAFF MESSAGE with patient name to the  Pool for scheduling with the following information:            -  Timing of next visit           -  Visit type ie Physical, OV, etc           -  Diagnoses/Reason ie. COPD, HTN - Do not use MEDICATION, Follow-up or CHECK UP - Give reason for visit      Next Appointment:   Future Appointments   Date Time Provider Department Center   8/5/2024 10:00 AM Marlyn Stark DO [facility] [place] - [facility]   9/19/2024  3:30 PM SCHEDULE, [facility]   9/19/2024  3:30 PM Cheryl Lopez, APRN - CNP [facility]       Message sent to  to schedule appt with patient?  NO      Requested Prescriptions     Pending Prescriptions Disp Refills    levothyroxine (SYNTHROID) 50 MCG tablet [Pharmacy Med Name: LEVOTHYROXINE 50 MCG TABLET] 90 tablet 1     Sig: TAKE 1 TABLET BY MOUTH EVERY DAY

## 2024-07-21 DIAGNOSIS — E78.2 MIXED HYPERLIPIDEMIA: ICD-10-CM

## 2024-07-22 RX ORDER — ATORVASTATIN CALCIUM 40 MG/1
TABLET, FILM COATED ORAL
Qty: 90 TABLET | Refills: 1 | Status: SHIPPED | OUTPATIENT
Start: 2024-07-22

## 2024-07-22 RX ORDER — LEVOTHYROXINE SODIUM 0.05 MG/1
TABLET ORAL
Qty: 90 TABLET | Refills: 1 | Status: SHIPPED | OUTPATIENT
Start: 2024-07-22

## 2024-07-22 NOTE — TELEPHONE ENCOUNTER
Refill Request     CONFIRM preferred pharmacy with the patient.    If Mail Order Rx - Pend for 90 day refill.      Last Seen: Last Seen Department: 11/16/2023  Last Seen by PCP: 11/16/2023    Last Written: 1/18/24 90 tab 1 refills    If no future appointment scheduled:  Review the last OV with PCP and review information for follow-up visit,  Route STAFF MESSAGE with patient name to the  Pool for scheduling with the following information:            -  Timing of next visit           -  Visit type ie Physical, OV, etc           -  Diagnoses/Reason ie. COPD, HTN - Do not use MEDICATION, Follow-up or CHECK UP - Give reason for visit      Next Appointment:   Future Appointments   Date Time Provider Department Center   8/5/2024 10:00 AM Marlyn Stark DO [facility] [place] [facility]   9/19/2024  3:30 PM SCHEDULE, [facility]ICE [facility]   9/19/2024  3:30 PM Cheryl Lopez, APRN - CNP [facility]       Message sent to  to schedule appt with patient?  NO      Requested Prescriptions     Pending Prescriptions Disp Refills    atorvastatin (LIPITOR) 40 MG tablet [Pharmacy Med Name: ATORVASTATIN 40 MG TABLET] 90 tablet 1     Sig: TAKE 1 TABLET BY MOUTH EVERYDAY AT BEDTIME

## 2024-07-27 DIAGNOSIS — I10 ESSENTIAL HYPERTENSION: ICD-10-CM

## 2024-07-28 NOTE — TELEPHONE ENCOUNTER
Refill Request     CONFIRM preferred pharmacy with the patient.    If Mail Order Rx - Pend for 90 day refill.      Last Seen: Last Seen Department: 11/16/2023  Last Seen by PCP: 11/16/2023    Last Written: Lisinopril 11/13/23 90 tabs 1 refill    If no future appointment scheduled:  Review the last OV with PCP and review information for follow-up visit,  Route STAFF MESSAGE with patient name to the  Pool for scheduling with the following information:            -  Timing of next visit           -  Visit type ie Physical, OV, etc           -  Diagnoses/Reason ie. COPD, HTN - Do not use MEDICATION, Follow-up or CHECK UP - Give reason for visit      Next Appointment:   Future Appointments   Date Time Provider Department Center   8/5/2024 10:00 AM Marlyn Stark DO [facility] - [facility]   9/19/2024  3:30 PM SCHEDULE, [facility] DEVICE CHECK [facility]   9/19/2024  3:30 PM Cheryl Lopez, APRN - CNP [facility]       Message sent to  to schedule appt with patient?  NO      Requested Prescriptions     Pending Prescriptions Disp Refills    lisinopril (PRINIVIL;ZESTRIL) 10 MG tablet 90 tablet 1     Sig: Take 1 tablet by mouth daily

## 2024-07-29 RX ORDER — LISINOPRIL 10 MG/1
10 TABLET ORAL DAILY
Qty: 90 TABLET | Refills: 1 | Status: SHIPPED | OUTPATIENT
Start: 2024-07-29

## 2024-08-01 DIAGNOSIS — E11.9 TYPE 2 DIABETES MELLITUS WITHOUT COMPLICATION, WITHOUT LONG-TERM CURRENT USE OF INSULIN (HCC): ICD-10-CM

## 2024-08-02 NOTE — TELEPHONE ENCOUNTER
Refill Request     CONFIRM preferred pharmacy with the patient.    If Mail Order Rx - Pend for 90 day refill.      Last Seen: Last Seen Department: 11/16/2023  Last Seen by PCP: 11/16/2023    Last Written: 2/19/24 360 with 1 refill     If no future appointment scheduled:  Review the last OV with PCP and review information for follow-up visit,  Route STAFF MESSAGE with patient name to the  Pool for scheduling with the following information:            -  Timing of next visit           -  Visit type ie Physical, OV, etc           -  Diagnoses/Reason ie. COPD, HTN - Do not use MEDICATION, Follow-up or CHECK UP - Give reason for visit      Next Appointment:   Future Appointments   Date Time Provider Department Center   8/5/2024 10:00 AM Marlyn Stark DO [facility] DEP   9/19/2024  3:30 PM SCHEDULE, [facility] DEVICE CHECK [facility]   9/19/2024  3:30 PM Cheryl Lopez, APRN - CNP [facility]       Message sent to  to schedule appt with patient?  NO      Requested Prescriptions     Pending Prescriptions Disp Refills    metFORMIN (GLUCOPHAGE) 500 MG tablet 360 tablet 1     Sig: TAKE 2 TABLETS BY MOUTH TWICE A DAY WITH MEALS

## 2024-08-15 DIAGNOSIS — J45.901 MODERATE ASTHMA WITH EXACERBATION, UNSPECIFIED WHETHER PERSISTENT: ICD-10-CM

## 2024-08-15 RX ORDER — ALBUTEROL SULFATE 90 UG/1
2 AEROSOL, METERED RESPIRATORY (INHALATION) 4 TIMES DAILY PRN
Qty: 8.5 EACH | Refills: 1 | Status: SHIPPED | OUTPATIENT
Start: 2024-08-15

## 2024-08-15 NOTE — TELEPHONE ENCOUNTER
Refill Request     CONFIRM preferred pharmacy with the patient.    If Mail Order Rx - Pend for 90 day refill.      Last Seen: Last Seen Department: 11/16/2023  Last Seen by PCP: 11/16/2023    Last Written: 6/24/24 8.5 each with 1 refill     If no future appointment scheduled:  Review the last OV with PCP and review information for follow-up visit,  Route STAFF MESSAGE with patient name to the  Pool for scheduling with the following information:            -  Timing of next visit           -  Visit type ie Physical, OV, etc           -  Diagnoses/Reason ie. COPD, HTN - Do not use MEDICATION, Follow-up or CHECK UP - Give reason for visit      Next Appointment:   Future Appointments   Date Time Provider Department Center   9/19/2024  3:30 PM SCHEDULE, [facility] DEVICE CHECK [facility]   9/19/2024  3:30 PM Cheryl Lopez, APRN - CNP [facility]       Message sent to  to schedule appt with patient?  YES Return in about 6 months (around 5/16/2024) for Diabetes, Hypertension, Hyperlipidemia.       Requested Prescriptions     Pending Prescriptions Disp Refills    albuterol sulfate HFA (PROVENTIL;VENTOLIN;PROAIR) 108 (90 Base) MCG/ACT inhaler [Pharmacy Med Name: ALBUTEROL HFA (PROAIR) INHALER] 8.5 each 1     Sig: INHALE 2 PUFFS INTO THE LUNGS 4 TIMES DAILY AS NEEDED FOR WHEEZING.

## 2024-08-16 DIAGNOSIS — E11.9 TYPE 2 DIABETES MELLITUS WITHOUT COMPLICATION, WITHOUT LONG-TERM CURRENT USE OF INSULIN (HCC): ICD-10-CM

## 2024-08-16 NOTE — TELEPHONE ENCOUNTER
Please confirm which pharmacy pt would like this to go to.     Was sent in on 8.2.24 to [facility] in [place] this request is for [place]

## 2024-09-09 ENCOUNTER — OFFICE VISIT (OUTPATIENT)
Dept: FAMILY MEDICINE CLINIC | Age: 64
End: 2024-09-09
Payer: MEDICARE

## 2024-09-09 VITALS
SYSTOLIC BLOOD PRESSURE: 130 MMHG | OXYGEN SATURATION: 99 % | WEIGHT: 254 LBS | HEART RATE: 98 BPM | DIASTOLIC BLOOD PRESSURE: 70 MMHG | HEIGHT: 76 IN | BODY MASS INDEX: 30.93 KG/M2

## 2024-09-09 DIAGNOSIS — E11.9 TYPE 2 DIABETES MELLITUS WITHOUT COMPLICATION, WITHOUT LONG-TERM CURRENT USE OF INSULIN (HCC): Primary | ICD-10-CM

## 2024-09-09 DIAGNOSIS — I10 ESSENTIAL HYPERTENSION: ICD-10-CM

## 2024-09-09 DIAGNOSIS — Z12.11 SCREENING FOR COLON CANCER: ICD-10-CM

## 2024-09-09 DIAGNOSIS — Z12.5 SCREENING PSA (PROSTATE SPECIFIC ANTIGEN): ICD-10-CM

## 2024-09-09 DIAGNOSIS — E78.2 MIXED HYPERLIPIDEMIA: ICD-10-CM

## 2024-09-09 DIAGNOSIS — E55.9 VITAMIN D DEFICIENCY: ICD-10-CM

## 2024-09-09 LAB
CREATININE URINE POCT: NORMAL
HBA1C MFR BLD: 7.8 %
MICROALBUMIN/CREAT 24H UR: NORMAL MG/DL
MICROALBUMIN/CREAT UR-RTO: NORMAL MG/G

## 2024-09-09 PROCEDURE — 3051F HG A1C>EQUAL 7.0%<8.0%: CPT | Performed by: FAMILY MEDICINE

## 2024-09-09 PROCEDURE — G2211 COMPLEX E/M VISIT ADD ON: HCPCS | Performed by: FAMILY MEDICINE

## 2024-09-09 PROCEDURE — 1123F ACP DISCUSS/DSCN MKR DOCD: CPT | Performed by: FAMILY MEDICINE

## 2024-09-09 PROCEDURE — 82044 UR ALBUMIN SEMIQUANTITATIVE: CPT | Performed by: FAMILY MEDICINE

## 2024-09-09 PROCEDURE — 3075F SYST BP GE 130 - 139MM HG: CPT | Performed by: FAMILY MEDICINE

## 2024-09-09 PROCEDURE — 83036 HEMOGLOBIN GLYCOSYLATED A1C: CPT | Performed by: FAMILY MEDICINE

## 2024-09-09 PROCEDURE — 3078F DIAST BP <80 MM HG: CPT | Performed by: FAMILY MEDICINE

## 2024-09-09 PROCEDURE — 99214 OFFICE O/P EST MOD 30 MIN: CPT | Performed by: FAMILY MEDICINE

## 2024-09-09 SDOH — ECONOMIC STABILITY: FOOD INSECURITY: WITHIN THE PAST 12 MONTHS, YOU WORRIED THAT YOUR FOOD WOULD RUN OUT BEFORE YOU GOT MONEY TO BUY MORE.: NEVER TRUE

## 2024-09-09 SDOH — ECONOMIC STABILITY: INCOME INSECURITY: HOW HARD IS IT FOR YOU TO PAY FOR THE VERY BASICS LIKE FOOD, HOUSING, MEDICAL CARE, AND HEATING?: NOT HARD AT ALL

## 2024-09-09 SDOH — ECONOMIC STABILITY: FOOD INSECURITY: WITHIN THE PAST 12 MONTHS, THE FOOD YOU BOUGHT JUST DIDN'T LAST AND YOU DIDN'T HAVE MONEY TO GET MORE.: NEVER TRUE

## 2024-09-09 ASSESSMENT — PATIENT HEALTH QUESTIONNAIRE - PHQ9
1. LITTLE INTEREST OR PLEASURE IN DOING THINGS: NOT AT ALL
SUM OF ALL RESPONSES TO PHQ QUESTIONS 1-9: 0
SUM OF ALL RESPONSES TO PHQ QUESTIONS 1-9: 0
2. FEELING DOWN, DEPRESSED OR HOPELESS: NOT AT ALL
SUM OF ALL RESPONSES TO PHQ QUESTIONS 1-9: 0
SUM OF ALL RESPONSES TO PHQ QUESTIONS 1-9: 0
SUM OF ALL RESPONSES TO PHQ9 QUESTIONS 1 & 2: 0

## 2024-09-10 DIAGNOSIS — E03.9 ACQUIRED HYPOTHYROIDISM: ICD-10-CM

## 2024-09-10 LAB
25(OH)D3 SERPL-MCNC: 21.3 NG/ML
ALBUMIN SERPL-MCNC: 4.3 G/DL (ref 3.4–5)
ALBUMIN/GLOB SERPL: 1.7 {RATIO} (ref 1.1–2.2)
ALP SERPL-CCNC: 97 U/L (ref 40–129)
ALT SERPL-CCNC: 28 U/L (ref 10–40)
ANION GAP SERPL CALCULATED.3IONS-SCNC: 13 MMOL/L (ref 3–16)
AST SERPL-CCNC: 17 U/L (ref 15–37)
BASOPHILS # BLD: 0.1 K/UL (ref 0–0.2)
BASOPHILS NFR BLD: 0.7 %
BILIRUB SERPL-MCNC: 0.3 MG/DL (ref 0–1)
BUN SERPL-MCNC: 22 MG/DL (ref 7–20)
CALCIUM SERPL-MCNC: 10.1 MG/DL (ref 8.3–10.6)
CHLORIDE SERPL-SCNC: 98 MMOL/L (ref 99–110)
CHOLEST SERPL-MCNC: 172 MG/DL (ref 0–199)
CO2 SERPL-SCNC: 25 MMOL/L (ref 21–32)
CREAT SERPL-MCNC: 0.8 MG/DL (ref 0.8–1.3)
DEPRECATED RDW RBC AUTO: 13.7 % (ref 12.4–15.4)
EOSINOPHIL # BLD: 0.1 K/UL (ref 0–0.6)
EOSINOPHIL NFR BLD: 1.7 %
GFR SERPLBLD CREATININE-BSD FMLA CKD-EPI: >90 ML/MIN/{1.73_M2}
GLUCOSE SERPL-MCNC: 122 MG/DL (ref 70–99)
HCT VFR BLD AUTO: 42.5 % (ref 40.5–52.5)
HDLC SERPL-MCNC: 39 MG/DL (ref 40–60)
HGB BLD-MCNC: 14.2 G/DL (ref 13.5–17.5)
LDLC SERPL CALC-MCNC: 111 MG/DL
LYMPHOCYTES # BLD: 1.5 K/UL (ref 1–5.1)
LYMPHOCYTES NFR BLD: 21.6 %
MCH RBC QN AUTO: 28.6 PG (ref 26–34)
MCHC RBC AUTO-ENTMCNC: 33.4 G/DL (ref 31–36)
MCV RBC AUTO: 85.6 FL (ref 80–100)
MONOCYTES # BLD: 0.5 K/UL (ref 0–1.3)
MONOCYTES NFR BLD: 6.8 %
NEUTROPHILS # BLD: 4.8 K/UL (ref 1.7–7.7)
NEUTROPHILS NFR BLD: 69.2 %
PLATELET # BLD AUTO: 369 K/UL (ref 135–450)
PMV BLD AUTO: 7.3 FL (ref 5–10.5)
POTASSIUM SERPL-SCNC: 5.2 MMOL/L (ref 3.5–5.1)
PROT SERPL-MCNC: 6.8 G/DL (ref 6.4–8.2)
PSA SERPL DL<=0.01 NG/ML-MCNC: 4.96 NG/ML (ref 0–4)
RBC # BLD AUTO: 4.96 M/UL (ref 4.2–5.9)
SODIUM SERPL-SCNC: 136 MMOL/L (ref 136–145)
T4 FREE SERPL-MCNC: 1.4 NG/DL (ref 0.9–1.8)
TRIGL SERPL-MCNC: 109 MG/DL (ref 0–150)
TSH SERPL DL<=0.005 MIU/L-ACNC: 4.58 UIU/ML (ref 0.27–4.2)
VLDLC SERPL CALC-MCNC: 22 MG/DL
WBC # BLD AUTO: 7 K/UL (ref 4–11)

## 2024-09-10 RX ORDER — LEVOTHYROXINE SODIUM 75 UG/1
75 TABLET ORAL DAILY
Qty: 90 TABLET | Refills: 1 | Status: SHIPPED | OUTPATIENT
Start: 2024-09-10

## 2024-09-12 ENCOUNTER — TELEMEDICINE (OUTPATIENT)
Dept: FAMILY MEDICINE CLINIC | Age: 64
End: 2024-09-12

## 2024-09-12 DIAGNOSIS — Z00.00 MEDICARE ANNUAL WELLNESS VISIT, SUBSEQUENT: Primary | ICD-10-CM

## 2024-09-12 ASSESSMENT — LIFESTYLE VARIABLES
HOW OFTEN DO YOU HAVE A DRINK CONTAINING ALCOHOL: MONTHLY OR LESS
HOW MANY STANDARD DRINKS CONTAINING ALCOHOL DO YOU HAVE ON A TYPICAL DAY: 1 OR 2

## 2024-09-12 ASSESSMENT — PATIENT HEALTH QUESTIONNAIRE - PHQ9
SUM OF ALL RESPONSES TO PHQ9 QUESTIONS 1 & 2: 0
2. FEELING DOWN, DEPRESSED OR HOPELESS: NOT AT ALL
SUM OF ALL RESPONSES TO PHQ QUESTIONS 1-9: 0
1. LITTLE INTEREST OR PLEASURE IN DOING THINGS: NOT AT ALL

## 2024-09-19 ENCOUNTER — OFFICE VISIT (OUTPATIENT)
Dept: CARDIOLOGY CLINIC | Age: 64
End: 2024-09-19
Payer: MEDICARE

## 2024-09-19 VITALS
HEART RATE: 97 BPM | HEIGHT: 76 IN | SYSTOLIC BLOOD PRESSURE: 128 MMHG | WEIGHT: 254.2 LBS | DIASTOLIC BLOOD PRESSURE: 68 MMHG | BODY MASS INDEX: 30.95 KG/M2 | OXYGEN SATURATION: 96 %

## 2024-09-19 DIAGNOSIS — I47.9 PAROXYSMAL TACHYCARDIA (HCC): Primary | ICD-10-CM

## 2024-09-19 DIAGNOSIS — I48.0 PAF (PAROXYSMAL ATRIAL FIBRILLATION) (HCC): ICD-10-CM

## 2024-09-19 DIAGNOSIS — R06.02 SHORTNESS OF BREATH: ICD-10-CM

## 2024-09-19 DIAGNOSIS — I10 ESSENTIAL HYPERTENSION: ICD-10-CM

## 2024-09-19 PROCEDURE — 3078F DIAST BP <80 MM HG: CPT | Performed by: NURSE PRACTITIONER

## 2024-09-19 PROCEDURE — 1123F ACP DISCUSS/DSCN MKR DOCD: CPT | Performed by: NURSE PRACTITIONER

## 2024-09-19 PROCEDURE — 3074F SYST BP LT 130 MM HG: CPT | Performed by: NURSE PRACTITIONER

## 2024-09-19 PROCEDURE — 93000 ELECTROCARDIOGRAM COMPLETE: CPT | Performed by: NURSE PRACTITIONER

## 2024-09-19 PROCEDURE — 99214 OFFICE O/P EST MOD 30 MIN: CPT | Performed by: NURSE PRACTITIONER

## 2024-10-05 DIAGNOSIS — J45.901 MODERATE ASTHMA WITH EXACERBATION, UNSPECIFIED WHETHER PERSISTENT: ICD-10-CM

## 2024-10-06 DIAGNOSIS — J45.901 MODERATE ASTHMA WITH EXACERBATION, UNSPECIFIED WHETHER PERSISTENT: ICD-10-CM

## 2024-10-07 RX ORDER — ALBUTEROL SULFATE 90 UG/1
2 INHALANT RESPIRATORY (INHALATION) 4 TIMES DAILY PRN
Qty: 8.5 EACH | Refills: 1 | OUTPATIENT
Start: 2024-10-07

## 2024-10-07 RX ORDER — ALBUTEROL SULFATE 90 UG/1
2 INHALANT RESPIRATORY (INHALATION) 4 TIMES DAILY PRN
Qty: 8.5 EACH | Refills: 1 | Status: SHIPPED | OUTPATIENT
Start: 2024-10-07

## 2024-10-07 NOTE — TELEPHONE ENCOUNTER
Refill Request     CONFIRM preferred pharmacy with the patient.    If Mail Order Rx - Pend for 90 day refill.      Last Seen: Last Seen Department: 9/12/2024  Last Seen by PCP: 9/12/2024    Last Written: 8/15/24 8.5 each 1 refills    If no future appointment scheduled:  Review the last OV with PCP and review information for follow-up visit,  Route STAFF MESSAGE with patient name to the  Pool for scheduling with the following information:            -  Timing of next visit           -  Visit type ie Physical, OV, etc           -  Diagnoses/Reason ie. COPD, HTN - Do not use MEDICATION, Follow-up or CHECK UP - Give reason for visit      Next Appointment:   Future Appointments   Date Time Provider Department Center   9/18/2025 10:30 AM Cheryl Lopez APRN - CNP [facility]       Message sent to  to schedule appt with patient?  YES  Return in about 6 months (around 3/9/2025) for Diabetes, Hypertension, Hyperlipidemia.       Requested Prescriptions     Pending Prescriptions Disp Refills    albuterol sulfate HFA (PROVENTIL;VENTOLIN;PROAIR) 108 (90 Base) MCG/ACT inhaler 8.5 each 1     Sig: Inhale 2 puffs into the lungs 4 times daily as needed for Wheezing for wheezing

## 2024-12-02 DIAGNOSIS — J45.901 MODERATE ASTHMA WITH EXACERBATION, UNSPECIFIED WHETHER PERSISTENT: ICD-10-CM

## 2024-12-02 RX ORDER — ALBUTEROL SULFATE 90 UG/1
2 INHALANT RESPIRATORY (INHALATION) 4 TIMES DAILY PRN
Qty: 8.5 EACH | Refills: 1 | Status: SHIPPED | OUTPATIENT
Start: 2024-12-02

## 2024-12-02 NOTE — TELEPHONE ENCOUNTER
Refill Request     CONFIRM preferred pharmacy with the patient.    If Mail Order Rx - Pend for 90 day refill.      Last Seen: Last Seen Department: 9/12/2024  Last Seen by PCP: 9/12/2024    Last Written: 10/07/24 8.5 with 1 refill    If no future appointment scheduled:  Review the last OV with PCP and review information for follow-up visit,  Route STAFF MESSAGE with patient name to the  Pool for scheduling with the following information:            -  Timing of next visit           -  Visit type ie Physical, OV, etc           -  Diagnoses/Reason ie. COPD, HTN - Do not use MEDICATION, Follow-up or CHECK UP - Give reason for visit      Next Appointment:   Future Appointments   Date Time Provider Department Center   3/11/2025 10:30 AM Marlyn Stark DO [facility]   9/18/2025 10:30 AM Cheryl Lopez, DEMETRI - CNP [facility]       Message sent to  to schedule appt with patient?  NO      Requested Prescriptions     Pending Prescriptions Disp Refills    albuterol sulfate HFA (PROVENTIL;VENTOLIN;PROAIR) 108 (90 Base) MCG/ACT inhaler 8.5 each 1     Sig: Inhale 2 puffs into the lungs 4 times daily as needed for Wheezing for wheezing

## 2024-12-13 NOTE — PATIENT INSTRUCTIONS
Everything looks great today, good job!   Continue current medications  Stay active along with a healthy diet  Check BP at home and call the office if consistently out of goal range  Follow up in 6 months with Dr. Junior Fontenot Attending Attestation (For Attendings USE Only)...

## 2025-01-06 DIAGNOSIS — I10 ESSENTIAL HYPERTENSION: ICD-10-CM

## 2025-01-06 RX ORDER — LISINOPRIL 10 MG/1
10 TABLET ORAL DAILY
Qty: 90 TABLET | Refills: 1 | Status: SHIPPED | OUTPATIENT
Start: 2025-01-06

## 2025-01-06 NOTE — TELEPHONE ENCOUNTER
Refill Request     CONFIRM preferred pharmacy with the patient.    If Mail Order Rx - Pend for 90 day refill.      Last Seen: Last Seen Department: 9/12/2024  Last Seen by PCP: 9/12/2024    /Last Written: 7/29/24 90 with 1 refill     If no future appointment scheduled:  Review the last OV with PCP and review information for follow-up visit,  Route STAFF MESSAGE with patient name to the  Pool for scheduling with the following information:            -  Timing of next visit           -  Visit type ie Physical, OV, etc           -  Diagnoses/Reason ie. COPD, HTN - Do not use MEDICATION, Follow-up or CHECK UP - Give reason for visit      Next Appointment:   Future Appointments   Date Time Provider Department Center   3/11/2025 10:30 AM Marlyn Stark DO [facility]   9/18/2025 10:30 AM Cheryl Lopez APRN - CNP [facility]       Message sent to  to schedule appt with patient?  NO      Requested Prescriptions     Pending Prescriptions Disp Refills    lisinopril (PRINIVIL;ZESTRIL) 10 MG tablet 90 tablet 1     Sig: Take 1 tablet by mouth daily

## 2025-01-17 DIAGNOSIS — E78.2 MIXED HYPERLIPIDEMIA: ICD-10-CM

## 2025-01-17 RX ORDER — ATORVASTATIN CALCIUM 40 MG/1
TABLET, FILM COATED ORAL
Qty: 90 TABLET | Refills: 1 | Status: SHIPPED | OUTPATIENT
Start: 2025-01-17

## 2025-01-17 NOTE — TELEPHONE ENCOUNTER
Refill Request     CONFIRM preferred pharmacy with the patient.    If Mail Order Rx - Pend for 90 day refill.      Last Seen: Last Seen Department: 9/12/2024  Last Seen by PCP: 9/12/2024    Last Written: 7/22/24 90 with 1 refill     If no future appointment scheduled:  Review the last OV with PCP and review information for follow-up visit,  Route STAFF MESSAGE with patient name to the  Pool for scheduling with the following information:            -  Timing of next visit           -  Visit type ie Physical, OV, etc           -  Diagnoses/Reason ie. COPD, HTN - Do not use MEDICATION, Follow-up or CHECK UP - Give reason for visit      Next Appointment:   Future Appointments   Date Time Provider Department Center   3/11/2025 10:30 AM Marlyn Stark DO [facility]   9/18/2025 10:30 AM Cheryl Lopez APRN - CNP [facility]       Message sent to  to schedule appt with patient?  NO      Requested Prescriptions     Pending Prescriptions Disp Refills    atorvastatin (LIPITOR) 40 MG tablet [Pharmacy Med Name: ATORVASTATIN 40 MG TABLET] 90 tablet 1     Sig: TAKE 1 TABLET BY MOUTH EVERYDAY AT BEDTIME

## 2025-01-26 DIAGNOSIS — J45.901 MODERATE ASTHMA WITH EXACERBATION, UNSPECIFIED WHETHER PERSISTENT: ICD-10-CM

## 2025-01-26 NOTE — TELEPHONE ENCOUNTER
Refill Request     CONFIRM preferred pharmacy with the patient.    If Mail Order Rx - Pend for 90 day refill.      Last Seen: Last Seen Department: 9/12/2024  Last Seen by PCP: 9/12/2024    Last Written: 12/02/2024 8.5 each 1 refills    If no future appointment scheduled:  Review the last OV with PCP and review information for follow-up visit,  Route STAFF MESSAGE with patient name to the  Pool for scheduling with the following information:            -  Timing of next visit           -  Visit type ie Physical, OV, etc           -  Diagnoses/Reason ie. COPD, HTN - Do not use MEDICATION, Follow-up or CHECK UP - Give reason for visit      Next Appointment:   Future Appointments   Date Time Provider Department Center   3/11/2025 10:30 AM Marlyn Stark DO [facility]   9/18/2025 10:30 AM Cheryl Lopez APRN - CNP [facility]       Message sent to  to schedule appt with patient?  NO      Requested Prescriptions     Pending Prescriptions Disp Refills    albuterol sulfate HFA (PROVENTIL;VENTOLIN;PROAIR) 108 (90 Base) MCG/ACT inhaler [Pharmacy Med Name: ALBUTEROL HFA (PROAIR) INHALER] 8.5 each 1     Sig: INHALE 2 PUFFS INTO THE LUNGS 4 TIMES DAILY AS NEEDED FOR WHEEZING FOR WHEEZING

## 2025-01-27 RX ORDER — ALBUTEROL SULFATE 90 UG/1
2 INHALANT RESPIRATORY (INHALATION) 4 TIMES DAILY PRN
Qty: 8.5 EACH | Refills: 1 | Status: SHIPPED | OUTPATIENT
Start: 2025-01-27

## 2025-01-29 DIAGNOSIS — I48.0 PAF (PAROXYSMAL ATRIAL FIBRILLATION) (HCC): ICD-10-CM

## 2025-01-29 DIAGNOSIS — E03.9 ACQUIRED HYPOTHYROIDISM: ICD-10-CM

## 2025-01-29 NOTE — TELEPHONE ENCOUNTER
.Refill Request     CONFIRM preferred pharmacy with the patient.    If Mail Order Rx - Pend for 90 day refill.      Last Seen: Last Seen Department: 9/12/2024  Last Seen by PCP: 9/12/2024    Last Written: 9-10-24 90 with 1     If no future appointment scheduled:  Review the last OV with PCP and review information for follow-up visit,  Route STAFF MESSAGE with patient name to the  Pool for scheduling with the following information:            -  Timing of next visit           -  Visit type ie Physical, OV, etc           -  Diagnoses/Reason ie. COPD, HTN - Do not use MEDICATION, Follow-up or CHECK UP - Give reason for visit      Next Appointment:   Future Appointments   Date Time Provider Department Center   3/11/2025 10:30 AM Marlyn Stark DO [facility]   9/18/2025 10:30 AM Cheryl Lopez APRN - CNP [facility]       Message sent to  to schedule appt with patient?  NO      Requested Prescriptions     Pending Prescriptions Disp Refills    levothyroxine (SYNTHROID) 75 MCG tablet [Pharmacy Med Name: LEVOTHYROXINE 75 MCG TABLET] 90 tablet 1     Sig: TAKE 1 TABLET BY MOUTH EVERY DAY

## 2025-01-30 RX ORDER — DILTIAZEM HYDROCHLORIDE 240 MG/1
CAPSULE, COATED, EXTENDED RELEASE ORAL
Qty: 180 CAPSULE | Refills: 2 | Status: SHIPPED | OUTPATIENT
Start: 2025-01-30

## 2025-01-30 RX ORDER — LEVOTHYROXINE SODIUM 75 UG/1
75 TABLET ORAL DAILY
Qty: 90 TABLET | Refills: 1 | Status: SHIPPED | OUTPATIENT
Start: 2025-01-30

## 2025-02-03 DIAGNOSIS — I48.0 PAF (PAROXYSMAL ATRIAL FIBRILLATION) (HCC): Primary | ICD-10-CM

## 2025-02-03 NOTE — TELEPHONE ENCOUNTER
Plavix cannot be used in place of xarelto because it is an antiplatelet, not a blood thinner- will not protect from stroke. According to patient's chart, Xarelto, eliquis, edoxaban, and pradaxa would be about the same price through patient's insurance. LOV w/ MILIND 9/2024. MILIND, what would you recommend?

## 2025-02-03 NOTE — TELEPHONE ENCOUNTER
Informed patient of message- he would like to try eliquis. Pending for sign off. Informed patient to call us back if this is unaffordable and we can discuss option at that time.

## 2025-02-03 NOTE — TELEPHONE ENCOUNTER
Agree Plavix is not an acceptable alternative.  My recommendation would be Eliquis.  If this is unaffordable then we can transition him to Coumadin which would require frequent lab checks and possible dietary changes.

## 2025-02-03 NOTE — TELEPHONE ENCOUNTER
Pt stated that he went to go to the pharmacy to  Xarelto and is was going to cost $338 which is took expensive for pt. Pt stated that the pharmacy advised him that he could try Plavix. Pt is concerned that will be just as expensive. Pt is out of the Xarelto and would like to know what his options are. Preferred pharmacy is    [facility]/pharmacy #[id number] - [place], [place] - [address] - P [phone]     Please advise.

## 2025-02-13 DIAGNOSIS — E78.2 MIXED HYPERLIPIDEMIA: ICD-10-CM

## 2025-02-14 RX ORDER — ATORVASTATIN CALCIUM 40 MG/1
TABLET, FILM COATED ORAL
Qty: 90 TABLET | Refills: 1 | Status: SHIPPED | OUTPATIENT
Start: 2025-02-14

## 2025-02-14 NOTE — TELEPHONE ENCOUNTER
Refill Request     CONFIRM preferred pharmacy with the patient.    If Mail Order Rx - Pend for 90 day refill.      Last Seen: Last Seen Department: 9/12/2024  Last Seen by PCP: 9/12/2024    Last Written: 1/17/25 #90 - 1 refill     If no future appointment scheduled:  Review the last OV with PCP and review information for follow-up visit,  Route STAFF MESSAGE with patient name to the  Pool for scheduling with the following information:            -  Timing of next visit           -  Visit type ie Physical, OV, etc           -  Diagnoses/Reason ie. COPD, HTN - Do not use MEDICATION, Follow-up or CHECK UP - Give reason for visit      Next Appointment:   Future Appointments   Date Time Provider Department Center   3/11/2025 10:30 AM Marlyn Stark DO [facility]   9/18/2025 10:30 AM Cheryl Lopez APRN - CNP [facility]       Message sent to  to schedule appt with patient?  NO      Requested Prescriptions     Pending Prescriptions Disp Refills    atorvastatin (LIPITOR) 40 MG tablet 90 tablet 1     Sig: TAKE 1 TABLET BY MOUTH EVERYDAY AT BEDTIME

## 2025-02-14 NOTE — TELEPHONE ENCOUNTER
Per the request the patient chose the pharmacy in [place].   I believe they are there at this time.   Routed to PCP.

## 2025-02-14 NOTE — TELEPHONE ENCOUNTER
Need to verify is requested pharmacy is where patient want's this sent. Refilled on 1/17/25 to [facility] in [place].  LVM for patient to call the office back and sent [employer] message.

## 2025-03-11 RX ORDER — FLUTICASONE PROPIONATE AND SALMETEROL 250; 50 UG/1; UG/1
1 POWDER RESPIRATORY (INHALATION) EVERY 12 HOURS
Qty: 60 EACH | Refills: 3 | Status: SHIPPED | OUTPATIENT
Start: 2025-03-11

## 2025-03-11 NOTE — TELEPHONE ENCOUNTER
Refill Request     CONFIRM preferred pharmacy with the patient.    If Mail Order Rx - Pend for 90 day refill.      Last Seen: Last Seen Department: 9/12/2024  Last Seen by PCP: 9/12/2024    Last Written: 03/18/2024 60 each 3 refills     If no future appointment scheduled:  Review the last OV with PCP and review information for follow-up visit,  Route STAFF MESSAGE with patient name to the  Pool for scheduling with the following information:            -  Timing of next visit           -  Visit type ie Physical, OV, etc           -  Diagnoses/Reason ie. COPD, HTN - Do not use MEDICATION, Follow-up or CHECK UP - Give reason for visit      Next Appointment:   Future Appointments   Date Time Provider Department Center   8/4/2025 10:00 AM Marlyn Stark DO [facility]   9/18/2025 10:30 AM Cheryl Lopez APRN - CNP [facility]       Message sent to  to schedule appt with patient?  NO      Requested Prescriptions     Pending Prescriptions Disp Refills    fluticasone-salmeterol (WIXELA INHUB) 250-50 MCG/ACT AEPB diskus inhaler 60 each 3     Sig: Inhale 1 puff into the lungs in the morning and 1 puff in the evening.

## 2025-03-26 DIAGNOSIS — J45.901 MODERATE ASTHMA WITH EXACERBATION, UNSPECIFIED WHETHER PERSISTENT: ICD-10-CM

## 2025-03-26 NOTE — TELEPHONE ENCOUNTER
Refill Request     CONFIRM preferred pharmacy with the patient.    If Mail Order Rx - Pend for 90 day refill.      Last Seen: Last Seen Department: 9/12/2024  Last Seen by PCP: 9/12/2024    Last Written: Albuterol sulfate HFA 01/27/2025 8.5 each with 1 refill     If no future appointment scheduled:  Review the last OV with PCP and review information for follow-up visit,  Route STAFF MESSAGE with patient name to the  Pool for scheduling with the following information:            -  Timing of next visit           -  Visit type ie Physical, OV, etc           -  Diagnoses/Reason ie. COPD, HTN - Do not use MEDICATION, Follow-up or CHECK UP - Give reason for visit      Next Appointment:   Future Appointments   Date Time Provider Department Center   8/4/2025 10:00 AM Marlyn Stark DO [facility]   9/18/2025 10:30 AM Cheryl Lopez APRN - CNP [facility]       Message sent to  to schedule appt with patient?  NO      Requested Prescriptions     Pending Prescriptions Disp Refills    albuterol sulfate HFA (PROVENTIL;VENTOLIN;PROAIR) 108 (90 Base) MCG/ACT inhaler [Pharmacy Med Name: ALBUTEROL HFA (PROAIR) INHALER] 8.5 each 1     Sig: INHALE 2 PUFFS INTO THE LUNGS 4 TIMES DAILY AS NEEDED FOR WHEEZING FOR WHEEZING

## 2025-03-27 RX ORDER — ALBUTEROL SULFATE 90 UG/1
2 INHALANT RESPIRATORY (INHALATION) 4 TIMES DAILY PRN
Qty: 8.5 EACH | Refills: 1 | Status: SHIPPED | OUTPATIENT
Start: 2025-03-27

## 2025-04-21 NOTE — TELEPHONE ENCOUNTER
He is taking all medications as prescribed. He took a PRN metoprolol last night which brought his HR down from 163 to 104 BPM. He thinks he may currently have a sinus infection and believes this brought on the AF. He is asymptomatic when in AF. Asked patient to send a manual transmission.  V/u. [No, patient denies ideation or behavior] : No, patient denies ideation or behavior

## 2025-05-09 ENCOUNTER — TELEPHONE (OUTPATIENT)
Dept: CARDIOLOGY CLINIC | Age: 65
End: 2025-05-09

## 2025-05-09 RX ORDER — DILTIAZEM HYDROCHLORIDE 240 MG/1
240 CAPSULE, COATED, EXTENDED RELEASE ORAL DAILY
Qty: 90 CAPSULE | Refills: 3 | Status: SHIPPED | OUTPATIENT
Start: 2025-05-09 | End: 2025-08-07

## 2025-05-09 NOTE — TELEPHONE ENCOUNTER
Refill  dilTIAZem (CARDIZEM CD) 240 MG extended release capsule   [facility]/pharmacy #[id number] - [place], [place] - [address] - P [phone] - F [phone]   Lov 9/19/24  Next 9/18/25

## 2025-05-14 NOTE — TELEPHONE ENCOUNTER
Refill Request     CONFIRM preferred pharmacy with the patient.    If Mail Order Rx - Pend for 90 day refill.      Last Seen: Last Seen Department: 11/16/2023  Last Seen by PCP: 11/16/2023    Last Written: 10/23/23 #6 with 1 refill    If no future appointment scheduled:  Review the last OV with PCP and review information for follow-up visit,  Route STAFF MESSAGE with patient name to the  Pool for scheduling with the following information:            -  Timing of next visit           -  Visit type ie Physical, OV, etc           -  Diagnoses/Reason ie. COPD, HTN - Do not use MEDICATION, Follow-up or CHECK UP - Give reason for visit      Next Appointment:   Future Appointments   Date Time Provider Department Center   6/24/2024 10:00 AM SCHEDULE, [facility] DEVICE CHECK [facility]   6/24/2024 10:00 AM Cheryl Lopez, APRN - CNP [facility]   8/5/2024 10:00 AM Marlyn Stark DO [facility] [facility]       Message sent to  to schedule appt with patient?  NO      Requested Prescriptions     Pending Prescriptions Disp Refills    TRULICITY 1.5 MG/0.5ML SC injection [Pharmacy Med Name: TRULICITY 1.5 MG/0.5 ML PEN]  4     Sig: INJECT 0.5 ML SUBCUTANEOUSLY ONE TIME PER WEEK        [Negative] : Heme/Lymph

## 2025-05-15 DIAGNOSIS — E11.9 TYPE 2 DIABETES MELLITUS WITHOUT COMPLICATION, WITHOUT LONG-TERM CURRENT USE OF INSULIN (HCC): ICD-10-CM

## 2025-05-15 NOTE — TELEPHONE ENCOUNTER
Refill Request     CONFIRM preferred pharmacy with the patient.    If Mail Order Rx - Pend for 90 day refill.      Last Seen: Last Seen Department: 9/12/2024  Last Seen by PCP: 9/12/2024    Last Written: 8/2/24  360 with 1 refill    If no future appointment scheduled:  Review the last OV with PCP and review information for follow-up visit,  Route STAFF MESSAGE with patient name to the  Pool for scheduling with the following information:            -  Timing of next visit           -  Visit type ie Physical, OV, etc           -  Diagnoses/Reason ie. COPD, HTN - Do not use MEDICATION, Follow-up or CHECK UP - Give reason for visit      Next Appointment:   Future Appointments   Date Time Provider Department Center   8/4/2025 10:00 AM M[facility]n Stark DO [facility]   9/18/2025 10:30 AM Cheryl Lopez APRN - CNP [facility]       Message sent to  to schedule appt with patient?  NO      Requested Prescriptions     Pending Prescriptions Disp Refills    metFORMIN (GLUCOPHAGE) 500 MG tablet [Pharmacy Med Name: METFORMIN  MG TABLET] 360 tablet 1     Sig: TAKE 2 TABLETS BY MOUTH TWICE A DAY WITH MEALS

## 2025-05-18 RX ORDER — METOPROLOL SUCCINATE 25 MG/1
25 TABLET, EXTENDED RELEASE ORAL DAILY
Qty: 90 TABLET | Refills: 2 | Status: CANCELLED | OUTPATIENT
Start: 2025-05-18

## 2025-05-19 RX ORDER — METOPROLOL SUCCINATE 25 MG/1
25 TABLET, EXTENDED RELEASE ORAL DAILY
Qty: 90 TABLET | Refills: 1 | Status: SHIPPED | OUTPATIENT
Start: 2025-05-19

## 2025-05-19 NOTE — TELEPHONE ENCOUNTER
Last Office Visit: 2024 Provider: MILIND  **Is provider OOT? No    Next Office Visit: 2025 Provider: MILIND  **If no OV, when does pt need to be seen? N/A  **Has patient already had 30 day supply? No    Lab orders needed? no   Encounter provider correct? Yes If not, change provider  Script changes since last refill? no    LAST LABS:   [id number]    **Care Everywhere? no

## 2025-05-25 DIAGNOSIS — J45.901 MODERATE ASTHMA WITH EXACERBATION, UNSPECIFIED WHETHER PERSISTENT: ICD-10-CM

## 2025-05-27 RX ORDER — ALBUTEROL SULFATE 90 UG/1
2 INHALANT RESPIRATORY (INHALATION) 4 TIMES DAILY PRN
Qty: 8.5 EACH | Refills: 1 | Status: SHIPPED | OUTPATIENT
Start: 2025-05-27

## 2025-05-27 NOTE — TELEPHONE ENCOUNTER
.Refill Request     CONFIRM preferred pharmacy with the patient.    If Mail Order Rx - Pend for 90 day refill.      Last Seen: Last Seen Department: 9/12/2024  Last Seen by PCP: 9/12/2024    Last Written: 3/27/25 8.5 with 1     If no future appointment scheduled:  Review the last OV with PCP and review information for follow-up visit,  Route STAFF MESSAGE with patient name to the  Pool for scheduling with the following information:            -  Timing of next visit           -  Visit type ie Physical, OV, etc           -  Diagnoses/Reason ie. COPD, HTN - Do not use MEDICATION, Follow-up or CHECK UP - Give reason for visit      Next Appointment:   Future Appointments   Date Time Provider Department Center   8/4/2025 10:00 AM Marlyn Stark DO [facility]   9/18/2025 10:30 AM Cheryl Lopez APRN - CNP [facility]       Message sent to  to schedule appt with patient?  NO      Requested Prescriptions     Pending Prescriptions Disp Refills    albuterol sulfate HFA (PROVENTIL;VENTOLIN;PROAIR) 108 (90 Base) MCG/ACT inhaler [Pharmacy Med Name: ALBUTEROL HFA (PROAIR) INHALER] 8.5 each 1     Sig: INHALE 2 PUFFS INTO THE LUNGS 4 TIMES DAILY AS NEEDED FOR WHEEZING FOR WHEEZING

## 2025-06-02 RX ORDER — TIRZEPATIDE 2.5 MG/.5ML
INJECTION, SOLUTION SUBCUTANEOUS
Qty: 0.5 ML | Refills: 1 | Status: SHIPPED | OUTPATIENT
Start: 2025-06-02

## 2025-06-02 NOTE — TELEPHONE ENCOUNTER
Refill Request     CONFIRM preferred pharmacy with the patient.    If Mail Order Rx - Pend for 90 day refill.      Last Seen: Last Seen Department: 9/12/2024  Last Seen by PCP: 9/12/2024    Last Written: discontinued by  9/9/24     If no future appointment scheduled:  Review the last OV with PCP and review information for follow-up visit,  Route STAFF MESSAGE with patient name to the  Pool for scheduling with the following information:            -  Timing of next visit           -  Visit type ie Physical, OV, etc           -  Diagnoses/Reason ie. COPD, HTN - Do not use MEDICATION, Follow-up or CHECK UP - Give reason for visit      Next Appointment:   Future Appointments   Date Time Provider Department Center   8/4/2025 10:00 AM Marlyn Stark DO [facility]   9/18/2025 10:30 AM Cheryl Lopez APRN - CNP [facility]       Message sent to  to schedule appt with patient?  NO      Requested Prescriptions     Pending Prescriptions Disp Refills    MOUNJARO 2.5 MG/0.5ML SOAJ pen [Pharmacy Med Name: MOUNJARO 2.5 MG/0.5 ML PEN]  1     Sig: INJECT 0.5 ML SUBCUTANEOUSLY ONE TIME PER WEEK

## 2025-06-23 RX ORDER — DILTIAZEM HYDROCHLORIDE 240 MG/1
240 CAPSULE, COATED, EXTENDED RELEASE ORAL 2 TIMES DAILY
Qty: 180 CAPSULE | Refills: 0 | Status: SHIPPED | OUTPATIENT
Start: 2025-06-23 | End: 2026-06-18

## 2025-06-23 NOTE — TELEPHONE ENCOUNTER
Pt called requesting a refill for  dilTIAZem (CARDIZEM CD) 240 MG extended release capsule   Pt stated that he has been instructed by NPAM to take 2 tablets daily. The script say 1 tablet daily. Please send new script with updated instructions to  [id number] [place] [place] [facility] [address] - P [phone] - F [phone] [[id number]]

## 2025-06-23 NOTE — TELEPHONE ENCOUNTER
Last Office Visit: 2024 Provider: MILIND  **Is provider OOT? No    Next Office Visit: 2025 Provider: MILIND  **Has patient already had 30 day supply? No    Lab orders needed? no   Encounter provider correct? Yes If not, change provider  Script changes since last refill? no    LAST LABS:   [id number]

## 2025-07-13 DIAGNOSIS — J45.901 MODERATE ASTHMA WITH EXACERBATION, UNSPECIFIED WHETHER PERSISTENT: ICD-10-CM

## 2025-07-14 RX ORDER — ALBUTEROL SULFATE 90 UG/1
2 INHALANT RESPIRATORY (INHALATION) 4 TIMES DAILY PRN
Qty: 8.5 EACH | Refills: 1 | Status: SHIPPED | OUTPATIENT
Start: 2025-07-14

## 2025-07-14 NOTE — TELEPHONE ENCOUNTER
Refill Request     CONFIRM preferred pharmacy with the patient.    If Mail Order Rx - Pend for 90 day refill.      Last Seen: Last Seen Department: 9/12/2024  Last Seen by PCP: 9/12/2024    Last Written: 5/27/25 8.5 with 1 refill     If no future appointment scheduled:  Review the last OV with PCP and review information for follow-up visit,  Route STAFF MESSAGE with patient name to the  Pool for scheduling with the following information:            -  Timing of next visit           -  Visit type ie Physical, OV, etc           -  Diagnoses/Reason ie. COPD, HTN - Do not use MEDICATION, Follow-up or CHECK UP - Give reason for visit      Next Appointment:   Future Appointments   Date Time Provider Department Center   8/4/2025 10:00 AM Marlyn Stark DO [facility]   9/18/2025 10:30 AM Cheryl Lopez APRN - C[facility]       Message sent to  to schedule appt with patient?  NO      Requested Prescriptions     Pending Prescriptions Disp Refills    albuterol sulfate HFA (PROVENTIL;VENTOLIN;PROAIR) 108 (90 Base) MCG/ACT inhaler [Pharmacy Med Name: ALBUTEROL HFA (PROAIR) INHALER] 8.5 each 1     Sig: INHALE 2 PUFFS INTO THE LUNGS 4 TIMES DAILY AS NEEDED FOR WHEEZING FOR WHEEZING

## 2025-07-30 NOTE — TELEPHONE ENCOUNTER
Refill Request     CONFIRM preferred pharmacy with the patient.    If Mail Order Rx - Pend for 90 day refill.      Last Seen: Last Seen Department: 9/12/2024  Last Seen by PCP: 9/12/2024    Last Written: 06/02/2025 0.5 mL 1 refills     If no future appointment scheduled:  Review the last OV with PCP and review information for follow-up visit,  Route STAFF MESSAGE with patient name to the  Pool for scheduling with the following information:            -  Timing of next visit           -  Visit type ie Physical, OV, etc           -  Diagnoses/Reason ie. COPD, HTN - Do not use MEDICATION, Follow-up or CHECK UP - Give reason for visit      Next Appointment:   Future Appointments   Date Time Provider Department Center   8/4/2025 10:00 AM Marlyn Stark DO [facility]   9/18/2025 10:30 AM Cheryl Lopez, DEM[facility] - CNP [facility]       Message sent to  to schedule appt with patient?  NO      Requested Prescriptions     Pending Prescriptions Disp Refills    Tirzepatide (MOUNJARO) 2.5 MG/0.5ML SOAJ pen 0.5 mL 1     Sig: Inject into the skin once a week

## 2025-08-01 RX ORDER — TIRZEPATIDE 2.5 MG/.5ML
INJECTION, SOLUTION SUBCUTANEOUS
Qty: 2 ML | Refills: 1 | OUTPATIENT
Start: 2025-08-01

## 2025-08-01 RX ORDER — TIRZEPATIDE 2.5 MG/.5ML
INJECTION, SOLUTION SUBCUTANEOUS WEEKLY
Qty: 0.5 ML | Refills: 1 | OUTPATIENT
Start: 2025-08-01

## 2025-08-01 NOTE — TELEPHONE ENCOUNTER
Please confirm the dose and pharmacy.  There are 2 different requests and previously was on 5 mg of Mounjaro.

## 2025-08-01 NOTE — TELEPHONE ENCOUNTER
.Refill Request     CONFIRM preferred pharmacy with the patient.    If Mail Order Rx - Pend for 90 day refill.      Last Seen: Last Seen Department: 9/12/2024  Last Seen by PCP: Visit date not found    Last Written: 6-2-25 0.5ml with 1     If no future appointment scheduled:  Review the last OV with PCP and review information for follow-up visit,  Route STAFF MESSAGE with patient name to the  Pool for scheduling with the following information:            -  Timing of next visit           -  Visit type ie Physical, OV, etc           -  Diagnoses/Reason ie. COPD, HTN - Do not use MEDICATION, Follow-up or CHECK UP - Give reason for visit      Next Appointment:   Future Appointments   Date Time Provider Department Center   8/4/2025 10:00 AM Marlyn Stark DO [facility]   9/18/2025 10:30 AM Cheryl Lopez APRN - [facility]       Message sent to  to schedule appt with patient?  NO      Requested Prescriptions     Pending Prescriptions Disp Refills    MOUNJARO 2.5 MG/0.5ML SOAJ pen [Pharmacy Med Name: MOUNJARO 2.5 MG/0.5 ML PEN]  1     Sig: INJECT 0.5 MILLILITER SUBCUTANEOUSLY ONE TIME PER WEEK

## 2025-08-01 NOTE — TELEPHONE ENCOUNTER
Patient states that he is unsure. When he was on the 5mg, he was injecting 0.5ml, this last one the 2.5mg, directions state to inject the 0.5ml, but patient stated he was doing the whole 2.5MG.       What I can gather is that originally he was on the 2.5mg for a bit, and then on 9/9/2025 you bumped him up to 5mg. his last request on 5/31/2025 that was sent in on 6/2/2025, came from the pharmacy as a new request, it was missed then that he was supposed ot be on the 5mg. Pharmacy is requesting the wrong dose.       Patient would like the 5mg sent in and can be sent to   [facility]

## 2025-08-02 SDOH — ECONOMIC STABILITY: INCOME INSECURITY: IN THE LAST 12 MONTHS, WAS THERE A TIME WHEN YOU WERE NOT ABLE TO PAY THE MORTGAGE OR RENT ON TIME?: NO

## 2025-08-02 SDOH — ECONOMIC STABILITY: FOOD INSECURITY: WITHIN THE PAST 12 MONTHS, THE FOOD YOU BOUGHT JUST DIDN'T LAST AND YOU DIDN'T HAVE MONEY TO GET MORE.: NEVER TRUE

## 2025-08-02 SDOH — HEALTH STABILITY: PHYSICAL HEALTH: ON AVERAGE, HOW MANY MINUTES DO YOU ENGAGE IN EXERCISE AT THIS LEVEL?: 30 MIN

## 2025-08-02 SDOH — ECONOMIC STABILITY: FOOD INSECURITY: WITHIN THE PAST 12 MONTHS, YOU WORRIED THAT YOUR FOOD WOULD RUN OUT BEFORE YOU GOT MONEY TO BUY MORE.: NEVER TRUE

## 2025-08-02 SDOH — HEALTH STABILITY: PHYSICAL HEALTH: ON AVERAGE, HOW MANY DAYS PER WEEK DO YOU ENGAGE IN MODERATE TO STRENUOUS EXERCISE (LIKE A BRISK WALK)?: 3 DAYS

## 2025-08-02 ASSESSMENT — PATIENT HEALTH QUESTIONNAIRE - PHQ9
SUM OF ALL RESPONSES TO PHQ QUESTIONS 1-9: 0
1. LITTLE INTEREST OR PLEASURE IN DOING THINGS: NOT AT ALL
2. FEELING DOWN, DEPRESSED OR HOPELESS: NOT AT ALL
SUM OF ALL RESPONSES TO PHQ QUESTIONS 1-9: 0

## 2025-08-02 ASSESSMENT — LIFESTYLE VARIABLES
HOW OFTEN DO YOU HAVE SIX OR MORE DRINKS ON ONE OCCASION: 1
HOW OFTEN DO YOU HAVE A DRINK CONTAINING ALCOHOL: NEVER
HOW MANY STANDARD DRINKS CONTAINING ALCOHOL DO YOU HAVE ON A TYPICAL DAY: 0
HOW OFTEN DO YOU HAVE A DRINK CONTAINING ALCOHOL: 1
HOW MANY STANDARD DRINKS CONTAINING ALCOHOL DO YOU HAVE ON A TYPICAL DAY: PATIENT DOES NOT DRINK

## 2025-08-04 ENCOUNTER — OFFICE VISIT (OUTPATIENT)
Dept: FAMILY MEDICINE CLINIC | Age: 65
End: 2025-08-04
Payer: MEDICARE

## 2025-08-04 VITALS
SYSTOLIC BLOOD PRESSURE: 126 MMHG | OXYGEN SATURATION: 97 % | DIASTOLIC BLOOD PRESSURE: 61 MMHG | HEART RATE: 93 BPM | HEIGHT: 76 IN | BODY MASS INDEX: 30.2 KG/M2 | WEIGHT: 248 LBS

## 2025-08-04 VITALS
HEART RATE: 93 BPM | RESPIRATION RATE: 16 BRPM | BODY MASS INDEX: 30.2 KG/M2 | HEIGHT: 76 IN | SYSTOLIC BLOOD PRESSURE: 126 MMHG | OXYGEN SATURATION: 97 % | WEIGHT: 248 LBS | DIASTOLIC BLOOD PRESSURE: 61 MMHG

## 2025-08-04 DIAGNOSIS — E55.9 VITAMIN D DEFICIENCY: ICD-10-CM

## 2025-08-04 DIAGNOSIS — E78.2 MIXED HYPERLIPIDEMIA: ICD-10-CM

## 2025-08-04 DIAGNOSIS — Z23 NEED FOR PNEUMOCOCCAL 20-VALENT CONJUGATE VACCINATION: ICD-10-CM

## 2025-08-04 DIAGNOSIS — E03.9 ACQUIRED HYPOTHYROIDISM: ICD-10-CM

## 2025-08-04 DIAGNOSIS — Z00.00 MEDICARE ANNUAL WELLNESS VISIT, SUBSEQUENT: Primary | ICD-10-CM

## 2025-08-04 DIAGNOSIS — E11.9 TYPE 2 DIABETES MELLITUS WITHOUT COMPLICATION, WITHOUT LONG-TERM CURRENT USE OF INSULIN (HCC): Primary | ICD-10-CM

## 2025-08-04 DIAGNOSIS — I10 ESSENTIAL HYPERTENSION: ICD-10-CM

## 2025-08-04 LAB
25(OH)D3 SERPL-MCNC: 22.3 NG/ML
ALBUMIN SERPL-MCNC: 4.3 G/DL (ref 3.4–5)
ALBUMIN/GLOB SERPL: 1.5 {RATIO} (ref 1.1–2.2)
ALP SERPL-CCNC: 113 U/L (ref 40–129)
ALT SERPL-CCNC: 28 U/L (ref 10–40)
ANION GAP SERPL CALCULATED.3IONS-SCNC: 12 MMOL/L (ref 3–16)
AST SERPL-CCNC: 18 U/L (ref 15–37)
BASOPHILS # BLD: 0 K/UL (ref 0–0.2)
BASOPHILS NFR BLD: 0.7 %
BILIRUB SERPL-MCNC: 0.3 MG/DL (ref 0–1)
BUN SERPL-MCNC: 15 MG/DL (ref 7–20)
CALCIUM SERPL-MCNC: 10 MG/DL (ref 8.3–10.6)
CHLORIDE SERPL-SCNC: 102 MMOL/L (ref 99–110)
CHOLEST SERPL-MCNC: 167 MG/DL (ref 0–199)
CO2 SERPL-SCNC: 25 MMOL/L (ref 21–32)
CREAT SERPL-MCNC: 0.8 MG/DL (ref 0.8–1.3)
DEPRECATED RDW RBC AUTO: 14.4 % (ref 12.4–15.4)
EOSINOPHIL # BLD: 0.2 K/UL (ref 0–0.6)
EOSINOPHIL NFR BLD: 2.8 %
GFR SERPLBLD CREATININE-BSD FMLA CKD-EPI: >90 ML/MIN/{1.73_M2}
GLUCOSE SERPL-MCNC: 153 MG/DL (ref 70–99)
HBA1C MFR BLD: 7.1 %
HCT VFR BLD AUTO: 42.5 % (ref 40.5–52.5)
HDLC SERPL-MCNC: 42 MG/DL (ref 40–60)
HGB BLD-MCNC: 14.4 G/DL (ref 13.5–17.5)
LDLC SERPL CALC-MCNC: 107 MG/DL
LYMPHOCYTES # BLD: 1.5 K/UL (ref 1–5.1)
LYMPHOCYTES NFR BLD: 23 %
MCH RBC QN AUTO: 28.5 PG (ref 26–34)
MCHC RBC AUTO-ENTMCNC: 33.9 G/DL (ref 31–36)
MCV RBC AUTO: 84 FL (ref 80–100)
MONOCYTES # BLD: 0.4 K/UL (ref 0–1.3)
MONOCYTES NFR BLD: 6.6 %
NEUTROPHILS # BLD: 4.4 K/UL (ref 1.7–7.7)
NEUTROPHILS NFR BLD: 66.9 %
PLATELET # BLD AUTO: 315 K/UL (ref 135–450)
PMV BLD AUTO: 7.4 FL (ref 5–10.5)
POTASSIUM SERPL-SCNC: 5.5 MMOL/L (ref 3.5–5.1)
PROT SERPL-MCNC: 7.2 G/DL (ref 6.4–8.2)
RBC # BLD AUTO: 5.05 M/UL (ref 4.2–5.9)
SODIUM SERPL-SCNC: 139 MMOL/L (ref 136–145)
TRIGL SERPL-MCNC: 88 MG/DL (ref 0–150)
TSH SERPL DL<=0.005 MIU/L-ACNC: 3.02 UIU/ML (ref 0.27–4.2)
VLDLC SERPL CALC-MCNC: 18 MG/DL
WBC # BLD AUTO: 6.6 K/UL (ref 4–11)

## 2025-08-04 PROCEDURE — 1123F ACP DISCUSS/DSCN MKR DOCD: CPT | Performed by: FAMILY MEDICINE

## 2025-08-04 PROCEDURE — 3074F SYST BP LT 130 MM HG: CPT | Performed by: FAMILY MEDICINE

## 2025-08-04 PROCEDURE — 90677 PCV20 VACCINE IM: CPT | Performed by: FAMILY MEDICINE

## 2025-08-04 PROCEDURE — G0009 ADMIN PNEUMOCOCCAL VACCINE: HCPCS | Performed by: FAMILY MEDICINE

## 2025-08-04 PROCEDURE — 3017F COLORECTAL CA SCREEN DOC REV: CPT | Performed by: FAMILY MEDICINE

## 2025-08-04 PROCEDURE — G2211 COMPLEX E/M VISIT ADD ON: HCPCS | Performed by: FAMILY MEDICINE

## 2025-08-04 PROCEDURE — 1036F TOBACCO NON-USER: CPT | Performed by: FAMILY MEDICINE

## 2025-08-04 PROCEDURE — 3051F HG A1C>EQUAL 7.0%<8.0%: CPT | Performed by: FAMILY MEDICINE

## 2025-08-04 PROCEDURE — 99214 OFFICE O/P EST MOD 30 MIN: CPT | Performed by: FAMILY MEDICINE

## 2025-08-04 PROCEDURE — 83036 HEMOGLOBIN GLYCOSYLATED A1C: CPT | Performed by: FAMILY MEDICINE

## 2025-08-04 PROCEDURE — 3078F DIAST BP <80 MM HG: CPT | Performed by: FAMILY MEDICINE

## 2025-08-04 PROCEDURE — 1159F MED LIST DOCD IN RCRD: CPT | Performed by: FAMILY MEDICINE

## 2025-08-04 PROCEDURE — G0439 PPPS, SUBSEQ VISIT: HCPCS | Performed by: FAMILY MEDICINE

## 2025-08-04 PROCEDURE — 2022F DILAT RTA XM EVC RTNOPTHY: CPT | Performed by: FAMILY MEDICINE

## 2025-08-04 PROCEDURE — G8427 DOCREV CUR MEDS BY ELIG CLIN: HCPCS | Performed by: FAMILY MEDICINE

## 2025-08-04 PROCEDURE — G8417 CALC BMI ABV UP PARAM F/U: HCPCS | Performed by: FAMILY MEDICINE

## 2025-08-04 RX ORDER — FLUTICASONE PROPIONATE AND SALMETEROL 250; 50 UG/1; UG/1
1 POWDER RESPIRATORY (INHALATION) EVERY 12 HOURS
Qty: 60 EACH | Refills: 3 | Status: SHIPPED | OUTPATIENT
Start: 2025-08-04

## 2025-08-04 RX ORDER — ATORVASTATIN CALCIUM 40 MG/1
TABLET, FILM COATED ORAL
Qty: 90 TABLET | Refills: 1 | Status: SHIPPED | OUTPATIENT
Start: 2025-08-04

## 2025-08-04 RX ORDER — LISINOPRIL 10 MG/1
10 TABLET ORAL DAILY
Qty: 90 TABLET | Refills: 1 | Status: SHIPPED | OUTPATIENT
Start: 2025-08-04

## 2025-08-04 RX ORDER — LEVOTHYROXINE SODIUM 75 UG/1
75 TABLET ORAL DAILY
Qty: 90 TABLET | Refills: 1 | Status: SHIPPED | OUTPATIENT
Start: 2025-08-04

## 2025-08-11 RX ORDER — TIRZEPATIDE 2.5 MG/.5ML
INJECTION, SOLUTION SUBCUTANEOUS
Refills: 3 | OUTPATIENT
Start: 2025-08-11

## 2025-08-26 DIAGNOSIS — J45.901 MODERATE ASTHMA WITH EXACERBATION, UNSPECIFIED WHETHER PERSISTENT: ICD-10-CM

## 2025-08-28 RX ORDER — ALBUTEROL SULFATE 90 UG/1
2 INHALANT RESPIRATORY (INHALATION) 4 TIMES DAILY PRN
Qty: 8.5 EACH | Refills: 1 | Status: SHIPPED | OUTPATIENT
Start: 2025-08-28